# Patient Record
Sex: MALE | Race: WHITE | NOT HISPANIC OR LATINO | URBAN - METROPOLITAN AREA
[De-identification: names, ages, dates, MRNs, and addresses within clinical notes are randomized per-mention and may not be internally consistent; named-entity substitution may affect disease eponyms.]

---

## 2017-11-02 ENCOUNTER — APPOINTMENT (RX ONLY)
Dept: URBAN - METROPOLITAN AREA CLINIC 349 | Facility: CLINIC | Age: 67
Setting detail: DERMATOLOGY
End: 2017-11-02

## 2017-11-02 DIAGNOSIS — L82.1 OTHER SEBORRHEIC KERATOSIS: ICD-10-CM

## 2017-11-02 DIAGNOSIS — D18.0 HEMANGIOMA: ICD-10-CM

## 2017-11-02 DIAGNOSIS — L82.0 INFLAMED SEBORRHEIC KERATOSIS: ICD-10-CM

## 2017-11-02 DIAGNOSIS — L81.4 OTHER MELANIN HYPERPIGMENTATION: ICD-10-CM

## 2017-11-02 DIAGNOSIS — L57.8 OTHER SKIN CHANGES DUE TO CHRONIC EXPOSURE TO NONIONIZING RADIATION: ICD-10-CM

## 2017-11-02 DIAGNOSIS — L81.2 FRECKLES: ICD-10-CM

## 2017-11-02 PROBLEM — L85.3 XEROSIS CUTIS: Status: ACTIVE | Noted: 2017-11-02

## 2017-11-02 PROBLEM — L55.1 SUNBURN OF SECOND DEGREE: Status: ACTIVE | Noted: 2017-11-02

## 2017-11-02 PROBLEM — L29.8 OTHER PRURITUS: Status: ACTIVE | Noted: 2017-11-02

## 2017-11-02 PROBLEM — D18.01 HEMANGIOMA OF SKIN AND SUBCUTANEOUS TISSUE: Status: ACTIVE | Noted: 2017-11-02

## 2017-11-02 PROBLEM — Z85.828 PERSONAL HISTORY OF OTHER MALIGNANT NEOPLASM OF SKIN: Status: ACTIVE | Noted: 2017-11-02

## 2017-11-02 PROBLEM — L20.84 INTRINSIC (ALLERGIC) ECZEMA: Status: ACTIVE | Noted: 2017-11-02

## 2017-11-02 PROCEDURE — ? COUNSELING

## 2017-11-02 PROCEDURE — ? LIQUID NITROGEN

## 2017-11-02 PROCEDURE — 99213 OFFICE O/P EST LOW 20 MIN: CPT | Mod: 25

## 2017-11-02 PROCEDURE — 17110 DESTRUCTION B9 LES UP TO 14: CPT

## 2017-11-02 ASSESSMENT — LOCATION ZONE DERM
LOCATION ZONE: LEG
LOCATION ZONE: ARM
LOCATION ZONE: FACE
LOCATION ZONE: TRUNK
LOCATION ZONE: HAND

## 2017-11-02 ASSESSMENT — LOCATION DETAILED DESCRIPTION DERM
LOCATION DETAILED: RIGHT MEDIAL MALAR CHEEK
LOCATION DETAILED: RIGHT RADIAL DORSAL HAND
LOCATION DETAILED: LEFT CHIN
LOCATION DETAILED: RIGHT MEDIAL UPPER BACK
LOCATION DETAILED: LEFT RIB CAGE
LOCATION DETAILED: LEFT PROXIMAL DORSAL FOREARM
LOCATION DETAILED: RIGHT RIB CAGE
LOCATION DETAILED: LEFT LATERAL ABDOMEN
LOCATION DETAILED: RIGHT POSTERIOR SHOULDER
LOCATION DETAILED: STERNUM
LOCATION DETAILED: RIGHT CENTRAL MALAR CHEEK
LOCATION DETAILED: RIGHT DISTAL PRETIBIAL REGION
LOCATION DETAILED: LEFT ANTERIOR DISTAL THIGH
LOCATION DETAILED: RIGHT SUPERIOR MEDIAL UPPER BACK
LOCATION DETAILED: LEFT POSTERIOR SHOULDER
LOCATION DETAILED: LEFT DISTAL CALF
LOCATION DETAILED: RIGHT INFERIOR MEDIAL FOREHEAD
LOCATION DETAILED: RIGHT PROXIMAL DORSAL FOREARM
LOCATION DETAILED: RIGHT LATERAL ABDOMEN
LOCATION DETAILED: RIGHT DISTAL CALF
LOCATION DETAILED: LEFT CENTRAL MALAR CHEEK
LOCATION DETAILED: RIGHT PROXIMAL PRETIBIAL REGION
LOCATION DETAILED: PERIUMBILICAL SKIN
LOCATION DETAILED: LEFT SUPERIOR MEDIAL MALAR CHEEK
LOCATION DETAILED: LEFT PROXIMAL PRETIBIAL REGION
LOCATION DETAILED: RIGHT ANTERIOR SHOULDER
LOCATION DETAILED: LEFT ANTERIOR SHOULDER
LOCATION DETAILED: LEFT ULNAR DORSAL HAND

## 2017-11-02 ASSESSMENT — LOCATION SIMPLE DESCRIPTION DERM
LOCATION SIMPLE: LEFT FOREARM
LOCATION SIMPLE: RIGHT HAND
LOCATION SIMPLE: CHEST
LOCATION SIMPLE: LEFT THIGH
LOCATION SIMPLE: LEFT CALF
LOCATION SIMPLE: ABDOMEN
LOCATION SIMPLE: LEFT SHOULDER
LOCATION SIMPLE: RIGHT CALF
LOCATION SIMPLE: LEFT CHEEK
LOCATION SIMPLE: CHIN
LOCATION SIMPLE: RIGHT PRETIBIAL REGION
LOCATION SIMPLE: LEFT HAND
LOCATION SIMPLE: RIGHT UPPER BACK
LOCATION SIMPLE: LEFT PRETIBIAL REGION
LOCATION SIMPLE: RIGHT SHOULDER
LOCATION SIMPLE: RIGHT CHEEK
LOCATION SIMPLE: RIGHT FOREARM
LOCATION SIMPLE: RIGHT FOREHEAD

## 2017-11-02 NOTE — PROCEDURE: LIQUID NITROGEN
Medical Necessity Clause: This procedure was medically necessary because the lesions that were treated were:  irritated by daily activities
Add 52 Modifier (Optional): no
Include Z78.9 (Other Specified Conditions Influencing Health Status) As An Associated Diagnosis?: Yes
Post-Care Instructions: I reviewed with the patient in detail post-care instructions. Patient is to wear sunprotection, and avoid picking at any of the treated lesions. Pt may apply Vaseline to crusted or scabbing areas.
Consent: The patient's consent was obtained including but not limited to risks of crusting, scabbing, blistering, scarring, darker or lighter pigmentary change, recurrence, incomplete removal and infection.
Detail Level: Detailed
Number Of Freeze-Thaw Cycles: 2 freeze-thaw cycles
Medical Necessity Information: It is in your best interest to select a reason for this procedure from the list below. All of these items fulfill various CMS LCD requirements except the new and changing color options.

## 2018-02-08 ENCOUNTER — APPOINTMENT (OUTPATIENT)
Dept: GENERAL RADIOLOGY | Age: 68
End: 2018-02-08
Attending: ORTHOPAEDIC SURGERY
Payer: MEDICARE

## 2018-02-08 ENCOUNTER — ANESTHESIA EVENT (OUTPATIENT)
Dept: SURGERY | Age: 68
End: 2018-02-08
Payer: MEDICARE

## 2018-02-08 ENCOUNTER — ANESTHESIA (OUTPATIENT)
Dept: SURGERY | Age: 68
End: 2018-02-08
Payer: MEDICARE

## 2018-02-08 ENCOUNTER — HOSPITAL ENCOUNTER (OUTPATIENT)
Age: 68
Setting detail: OUTPATIENT SURGERY
Discharge: HOME | End: 2018-02-08
Attending: ORTHOPAEDIC SURGERY | Admitting: ORTHOPAEDIC SURGERY
Payer: MEDICARE

## 2018-02-08 VITALS
BODY MASS INDEX: 29.57 KG/M2 | HEART RATE: 58 BPM | OXYGEN SATURATION: 95 % | WEIGHT: 215 LBS | DIASTOLIC BLOOD PRESSURE: 89 MMHG | TEMPERATURE: 98 F | SYSTOLIC BLOOD PRESSURE: 134 MMHG | RESPIRATION RATE: 16 BRPM

## 2018-02-08 PROCEDURE — 77030000032 HC CUF TRNQT ZIMM -B: Performed by: ORTHOPAEDIC SURGERY

## 2018-02-08 PROCEDURE — 77030020274 HC MISC IMPL ORTHOPEDIC: Performed by: ORTHOPAEDIC SURGERY

## 2018-02-08 PROCEDURE — C1713 ANCHOR/SCREW BN/BN,TIS/BN: HCPCS | Performed by: ORTHOPAEDIC SURGERY

## 2018-02-08 PROCEDURE — C1769 GUIDE WIRE: HCPCS | Performed by: ORTHOPAEDIC SURGERY

## 2018-02-08 PROCEDURE — 77030020778 HC CAP PROTCT PIN JRGN -A: Performed by: ORTHOPAEDIC SURGERY

## 2018-02-08 PROCEDURE — 74011250636 HC RX REV CODE- 250/636: Performed by: ANESTHESIOLOGY

## 2018-02-08 PROCEDURE — 77030003602 HC NDL NRV BLK BBMI -B: Performed by: ANESTHESIOLOGY

## 2018-02-08 PROCEDURE — 76210000063 HC OR PH I REC FIRST 0.5 HR: Performed by: ORTHOPAEDIC SURGERY

## 2018-02-08 PROCEDURE — 77030011884 HC TAPE CST PLSTR BSNM -A: Performed by: ORTHOPAEDIC SURGERY

## 2018-02-08 PROCEDURE — 77030015464 HC BIT DRL QC SYNT -E: Performed by: ORTHOPAEDIC SURGERY

## 2018-02-08 PROCEDURE — 76942 ECHO GUIDE FOR BIOPSY: CPT | Performed by: ORTHOPAEDIC SURGERY

## 2018-02-08 PROCEDURE — 77030011640 HC PAD GRND REM COVD -A: Performed by: ORTHOPAEDIC SURGERY

## 2018-02-08 PROCEDURE — 76210000021 HC REC RM PH II 0.5 TO 1 HR: Performed by: ORTHOPAEDIC SURGERY

## 2018-02-08 PROCEDURE — 74011250636 HC RX REV CODE- 250/636

## 2018-02-08 PROCEDURE — 76010000149 HC OR TIME 1 TO 1.5 HR: Performed by: ORTHOPAEDIC SURGERY

## 2018-02-08 PROCEDURE — 76010010054 HC POST OP PAIN BLOCK: Performed by: ORTHOPAEDIC SURGERY

## 2018-02-08 PROCEDURE — 76060000033 HC ANESTHESIA 1 TO 1.5 HR: Performed by: ORTHOPAEDIC SURGERY

## 2018-02-08 PROCEDURE — 77030002933 HC SUT MCRYL J&J -A: Performed by: ORTHOPAEDIC SURGERY

## 2018-02-08 PROCEDURE — 77030018836 HC SOL IRR NACL ICUM -A: Performed by: ORTHOPAEDIC SURGERY

## 2018-02-08 PROCEDURE — 77030002916 HC SUT ETHLN J&J -A: Performed by: ORTHOPAEDIC SURGERY

## 2018-02-08 PROCEDURE — 77030006788 HC BLD SAW OSC STRY -B: Performed by: ORTHOPAEDIC SURGERY

## 2018-02-08 PROCEDURE — 74011250637 HC RX REV CODE- 250/637: Performed by: ANESTHESIOLOGY

## 2018-02-08 PROCEDURE — 77030002982 HC SUT POLYSRB J&J -A: Performed by: ORTHOPAEDIC SURGERY

## 2018-02-08 PROCEDURE — 74011250636 HC RX REV CODE- 250/636: Performed by: ORTHOPAEDIC SURGERY

## 2018-02-08 DEVICE — IMPLANTABLE DEVICE: Type: IMPLANTABLE DEVICE | Site: FOOT | Status: FUNCTIONAL

## 2018-02-08 DEVICE — STAPLE INT W11XH10MM WIRE 1.5X1.5MM HND WRST NIT SPD CONT: Type: IMPLANTABLE DEVICE | Site: FOOT | Status: FUNCTIONAL

## 2018-02-08 RX ORDER — CEFAZOLIN SODIUM/WATER 2 G/20 ML
2 SYRINGE (ML) INTRAVENOUS
Status: COMPLETED | OUTPATIENT
Start: 2018-02-08 | End: 2018-02-08

## 2018-02-08 RX ORDER — PROPOFOL 10 MG/ML
INJECTION, EMULSION INTRAVENOUS AS NEEDED
Status: DISCONTINUED | OUTPATIENT
Start: 2018-02-08 | End: 2018-02-08 | Stop reason: HOSPADM

## 2018-02-08 RX ORDER — HYDROMORPHONE HYDROCHLORIDE 2 MG/ML
0.5 INJECTION, SOLUTION INTRAMUSCULAR; INTRAVENOUS; SUBCUTANEOUS
Status: DISCONTINUED | OUTPATIENT
Start: 2018-02-08 | End: 2018-02-08 | Stop reason: HOSPADM

## 2018-02-08 RX ORDER — SODIUM CHLORIDE, SODIUM LACTATE, POTASSIUM CHLORIDE, CALCIUM CHLORIDE 600; 310; 30; 20 MG/100ML; MG/100ML; MG/100ML; MG/100ML
75 INJECTION, SOLUTION INTRAVENOUS CONTINUOUS
Status: DISCONTINUED | OUTPATIENT
Start: 2018-02-08 | End: 2018-02-08 | Stop reason: HOSPADM

## 2018-02-08 RX ORDER — FAMOTIDINE 20 MG/1
20 TABLET, FILM COATED ORAL ONCE
Status: COMPLETED | OUTPATIENT
Start: 2018-02-08 | End: 2018-02-08

## 2018-02-08 RX ORDER — NALOXONE HYDROCHLORIDE 0.4 MG/ML
0.2 INJECTION, SOLUTION INTRAMUSCULAR; INTRAVENOUS; SUBCUTANEOUS AS NEEDED
Status: DISCONTINUED | OUTPATIENT
Start: 2018-02-08 | End: 2018-02-08 | Stop reason: HOSPADM

## 2018-02-08 RX ORDER — PROPOFOL 10 MG/ML
INJECTION, EMULSION INTRAVENOUS
Status: DISCONTINUED | OUTPATIENT
Start: 2018-02-08 | End: 2018-02-08 | Stop reason: HOSPADM

## 2018-02-08 RX ORDER — ONDANSETRON 2 MG/ML
INJECTION INTRAMUSCULAR; INTRAVENOUS AS NEEDED
Status: DISCONTINUED | OUTPATIENT
Start: 2018-02-08 | End: 2018-02-08 | Stop reason: HOSPADM

## 2018-02-08 RX ORDER — LIDOCAINE HYDROCHLORIDE 10 MG/ML
0.1 INJECTION INFILTRATION; PERINEURAL AS NEEDED
Status: DISCONTINUED | OUTPATIENT
Start: 2018-02-08 | End: 2018-02-08 | Stop reason: HOSPADM

## 2018-02-08 RX ORDER — SODIUM CHLORIDE 0.9 % (FLUSH) 0.9 %
5-10 SYRINGE (ML) INJECTION AS NEEDED
Status: DISCONTINUED | OUTPATIENT
Start: 2018-02-08 | End: 2018-02-08 | Stop reason: HOSPADM

## 2018-02-08 RX ORDER — OXYCODONE AND ACETAMINOPHEN 5; 325 MG/1; MG/1
1 TABLET ORAL AS NEEDED
Status: DISCONTINUED | OUTPATIENT
Start: 2018-02-08 | End: 2018-02-08 | Stop reason: HOSPADM

## 2018-02-08 RX ORDER — MIDAZOLAM HYDROCHLORIDE 1 MG/ML
2 INJECTION, SOLUTION INTRAMUSCULAR; INTRAVENOUS
Status: DISCONTINUED | OUTPATIENT
Start: 2018-02-08 | End: 2018-02-08 | Stop reason: HOSPADM

## 2018-02-08 RX ADMIN — PROPOFOL 140 MCG/KG/MIN: 10 INJECTION, EMULSION INTRAVENOUS at 14:05

## 2018-02-08 RX ADMIN — FAMOTIDINE 20 MG: 20 TABLET, FILM COATED ORAL at 12:54

## 2018-02-08 RX ADMIN — SODIUM CHLORIDE, SODIUM LACTATE, POTASSIUM CHLORIDE, AND CALCIUM CHLORIDE 75 ML/HR: 600; 310; 30; 20 INJECTION, SOLUTION INTRAVENOUS at 12:54

## 2018-02-08 RX ADMIN — PROPOFOL 30 MG: 10 INJECTION, EMULSION INTRAVENOUS at 14:35

## 2018-02-08 RX ADMIN — PROPOFOL 50 MG: 10 INJECTION, EMULSION INTRAVENOUS at 14:05

## 2018-02-08 RX ADMIN — Medication 2 G: at 14:05

## 2018-02-08 RX ADMIN — ONDANSETRON 4 MG: 2 INJECTION INTRAMUSCULAR; INTRAVENOUS at 14:37

## 2018-02-08 RX ADMIN — MIDAZOLAM HYDROCHLORIDE 2 MG: 1 INJECTION, SOLUTION INTRAMUSCULAR; INTRAVENOUS at 13:05

## 2018-02-08 NOTE — IP AVS SNAPSHOT
[address] 
[phone] Patient: Torsten Urbina MRN: [id number] About your hospitalization You were admitted on:  February 8, 2018 You last received care in the:  [place] [address] You were discharged on:  February 8, 2018 Why you were hospitalized Your primary diagnosis was:  Not on File Follow-up Information Follow up With Details Comments Contact Info Monico Gregory MD  as scheduled [address] [address] 
[phone] Discharge Orders None A check trace indicates which time of day the medication should be taken. My Medications ASK your doctor about these medications Instructions Each Dose to Equal  
 Morning Noon Evening Bedtime  
 aspirin, buffered 81 mg Tab Your last dose was: Your next dose is: Take  by mouth. Indications: prevention of transient ischemic attack  
     
   
   
   
  
 multivitamin tablet Commonly known as:  ONE A DAY Your last dose was: Your next dose is: Take 1 tablet by mouth daily. 1 Tab  
    
   
   
   
  
 sildenafil citrate 100 mg tablet Commonly known as:  VIAGRA Your last dose was: Your next dose is: Take 1 Tab by mouth as needed. 100 mg Discharge Instructions INSTRUCTIONS FOLLOWING FOOT SURGERY 
 
ACTIVITY Elevate foot. No Ice Protected partial weight bearing on the heel only as tolerated in post op shoe after full sensation returns. DIET Day of Surgery: Clear liquids until no nausea or vomiting; then light, bland diet (Baked chicken, plain rice, grits, scrambled egg, toast). Nothing Greasy, fried or spicy today Advance to regular diet on second day, unless your doctor orders otherwise. PAIN Take pain medications as directed by your doctor. Call your doctor if pain is NOT relieved by medication. DRESSING CARE Keep dry and in place until follow up appointment CALL YOUR DOCTOR IF YOU HAVE Excessive bleeding that does not stop after holding mild pressure over the area. Temperature of 101 degrees or above. Redness, excessive swelling or bruising, and/or green or yellow, smelly discharge from incision. Loss of sensation - cold, white or blue toes. AFTER ANESTHESIA For the first 24 hours and while taking narcotics for pain: DO NOT Drive, Drink Alcoholic beverages, or make important Decisions. Be aware of dizziness following anesthesia and while taking pain medication. Preventing Infection at Home We care about preventing infection and avoiding the spread of germs  not only when you are in the hospital but also when you return home. When you return home from the hospital, its important to take the following steps to help prevent infection and avoid spreading germs that could infect you and others. Ask everyone in your home to follow these guidelines, too. Clean Your Hands · Clean your hands whenever your hands are visibly dirty, before you eat, before or after touching your mouth, nose or eyes, and before preparing food. Clean them after contact with body fluids, using the restroom, touching animals or changing diapers. · When washing hands, wet them with warm water and work up a lather. Rub hands for at least 15 seconds, then rinse them and pat them dry with a clean towel or paper towel. · When using hand sanitizers, it should take about 15 seconds to rub your hands dry. If not, you probably didnt apply enough . Cover Your Sneeze or Cough Germs are released into the air whenever you sneeze or cough. To prevent the spread of infection: · Turn away from other people before coughing or sneezing. · Cover your mouth or nose with a tissue when you cough or sneeze. Put the tissue in the trash. · If you dont have a tissue, cough or sneeze into your upper sleeve, not your hands. · Always clean your hands after coughing or sneezing. Care for Wounds Your skin is your bodys first line of defense against germs, but an open wound leaves an easy way for germs to enter your body. To prevent infection: · Clean your hands before and after changing wound dressings, and wear gloves to change dressings if recommended by your doctor. · Take special care with IV lines or other devices inserted into the body. If you must touch them, clean your hands first. 
· Follow any specific instructions from your doctor to care for your wounds. Contact your doctor if you experience any signs of infection, such as fever or increased redness at the surgical or wound site. Keep a [place] [address] · Clean or wipe commonly touched hard surfaces like door handles, sinks, tabletops, phones and TV remotes. · Use products labeled disinfectant to kill harmful bacteria and viruses. · Use a clean cloth or paper towel to clean and dry surfaces. Wiping surfaces with a dirty dishcloth, sponge or towel will only spread germs. · Never share toothbrushes, ortiz, drinking glasses, utensils, razor blades, face cloths or bath towels to avoid spreading germs. · Be sure that the linens that you sleep on are clean. · Keep pets away from wounds and wash your hands after touching pets, their toys or bedding. We care about you and your health. Remember, preventing infections is a team effort between you, your family, friends and health care providers. DISCHARGE SUMMARY from Nurse PATIENT INSTRUCTIONS: 
 
After general anesthesia or intravenous sedation, for 24 hours or while taking prescription Narcotics: · Limit your activities · Do not drive and operate hazardous machinery · Do not make important personal or business decisions · Do  not drink alcoholic beverages · If you have not urinated within 8 hours after discharge, please contact your surgeon on call. *  Please give a list of your current medications to your Primary Care Provider. *  Please update this list whenever your medications are discontinued, doses are 
    changed, or new medications (including over-the-counter products) are added. *  Please carry medication information at all times in case of emergency situations. These are general instructions for a healthy lifestyle: No smoking/ No tobacco products/ Avoid exposure to second hand smoke Surgeon General's Warning:  Quitting smoking now greatly reduces serious risk to your health. Obesity, smoking, and sedentary lifestyle greatly increases your risk for illness A healthy diet, regular physical exercise & weight monitoring are important for maintaining a healthy lifestyle You may be retaining fluid if you have a history of heart failure or if you experience any of the following symptoms:  Weight gain of 3 pounds or more overnight or 5 pounds in a week, increased swelling in our hands or feet or shortness of breath while lying flat in bed. Please call your doctor as soon as you notice any of these symptoms; do not wait until your next office visit. Recognize signs and symptoms of STROKE: 
 
F-face looks uneven A-arms unable to move or move unevenly S-speech slurred or non-existent T-time-call 911 as soon as signs and symptoms begin-DO NOT go Back to bed or wait to see if you get better-TIME IS BRAIN. Introducing [facility]! [facility] introduces [employer] patient portal. Now you can access parts of your medical record, email your doctor's office, and request medication refills online. 1. In your internet browser, go to [email or web address] 2. Click on the First Time User? Click Here link in the Sign In box. You will see the New Member Sign Up page. 3. Enter your [employer] Access Code exactly as it appears below. You will not need to use this code after youve completed the sign-up process. If you do not sign up before the expiration date, you must request a new code. · [employer] Access Code: [email or web address] Expires: 5/3/2018  1:44 PM 
 
4. Enter the last four digits of your Social Security Number (xxxx) and Date of Birth (mm/dd/yyyy) as indicated and click Submit. You will be taken to the next sign-up page. 5. Create a [employer] ID. This will be your [employer] login ID and cannot be changed, so think of one that is secure and easy to remember. 6. Create a [employer] password. You can change your password at any time. 7. Enter your Password Reset Question and Answer. This can be used at a later time if you forget your password. 8. Enter your e-mail address. You will receive e-mail notification when new information is available in [address]. 9. Click Sign Up. You can now view and download portions of your medical record. 10. Click the Download Summary menu link to download a portable copy of your medical information. If you have questions, please visit the Frequently Asked Questions section of the [employer] website. Remember, [employer] is NOT to be used for urgent needs. For medical emergencies, dial 911. Now available from your iPhone and Android! Providers Seen During Your Hospitalization Provider Specialty Primary office phone Gregor Gerardo MD Orthopedic Surgery [phone] Your Primary Care Physician (PCP) Primary Care Physician Office Phone Office Fax Mook Covarrubias [phone] You are allergic to the following No active allergies Recent Documentation Weight BMI Smoking Status 97.5 kg 29.57 kg/m2 Former Smoker Emergency Contacts Name Discharge Info Relation Home Work Mobile Antonia Sanderson  Spouse [phone] Patient Belongings The following personal items are in your possession at time of discharge: 
     Visual Aid: None          Jewelry: None  Clothing: Undergarments, Footwear, Shirt, Pants Please provide this summary of care documentation to your next provider. Signatures-by signing, you are acknowledging that this After Visit Summary has been reviewed with you and you have received a copy. Patient Signature:  ____________________________________________________________ Date:  ____________________________________________________________  
  
Valarie Deiters Provider Signature:  ____________________________________________________________ Date:  ____________________________________________________________

## 2018-02-08 NOTE — BRIEF OP NOTE
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  Item Serial No.  Lot No. LRB No. Used Action   STAPLE BNE FT/ANKL [id number] -- SPEED - [id number]  STAPLE BNE FT/ANKL [id number] -- SPEED  [employer] [place] [id number] Right 1 Implanted   SCR COMP HDLSS S-THRD [id number] -- JOSH - [id number]  SCR COMP HDLSS S-THRD [id number] -- Tamia Pace Right 1 Implanted   [place] Plantar Plate Repair Implant        [facility] [id number] Right 1 Implanted

## 2018-02-08 NOTE — ANESTHESIA PREPROCEDURE EVALUATION
Anesthetic History   No history of anesthetic complications            Review of Systems / Medical History  Patient summary reviewed, nursing notes reviewed and pertinent labs reviewed    Pulmonary  Within defined limits                 Neuro/Psych   Within defined limits           Cardiovascular                  Exercise tolerance: >4 METS     GI/Hepatic/Renal  Within defined limits              Endo/Other  Within defined limits           Other Findings              Physical Exam    Airway  Mallampati: II  TM Distance: 4 - 6 cm  Neck ROM: normal range of motion   Mouth opening: Normal     Cardiovascular    Rhythm: regular           Dental  No notable dental hx       Pulmonary  Breath sounds clear to auscultation               Abdominal         Other Findings            Anesthetic Plan    ASA: 2  Anesthesia type: regional          Induction: Intravenous  Anesthetic plan and risks discussed with: Patient

## 2018-02-08 NOTE — DISCHARGE INSTRUCTIONS
INSTRUCTIONS FOLLOWING FOOT SURGERY    ACTIVITY  Elevate foot. No Ice  Protected partial weight bearing on the heel only as tolerated in post op shoe after full sensation returns. DIET  Day of Surgery: Clear liquids until no nausea or vomiting; then light, bland diet (Baked chicken, plain rice, grits, scrambled egg, toast). Nothing Greasy, fried or spicy today  Advance to regular diet on second day, unless your doctor orders otherwise. PAIN  Take pain medications as directed by your doctor. Call your doctor if pain is NOT relieved by medication. DRESSING CARE Keep dry and in place until follow up appointment    CALL YOUR DOCTOR IF YOU HAVE  Excessive bleeding that does not stop after holding mild pressure over the area. Temperature of 101 degrees or above. Redness, excessive swelling or bruising, and/or green or yellow, smelly discharge from incision. Loss of sensation - cold, white or blue toes. AFTER ANESTHESIA  For the first 24 hours and while taking narcotics for pain: DO NOT Drive, Drink Alcoholic beverages, or make important Decisions. Be aware of dizziness following anesthesia and while taking pain medication. Preventing Infection at Home  We care about preventing infection and avoiding the spread of germs - not only when you are in the hospital but also when you return home. When you return home from the hospital, its important to take the following steps to help prevent infection and avoid spreading germs that could infect you and others. Ask everyone in your home to follow these guidelines, too. Clean Your Hands  · Clean your hands whenever your hands are visibly dirty, before you eat, before or after touching your mouth, nose or eyes, and before preparing food. Clean them after contact with body fluids, using the restroom, touching animals or changing diapers. · When washing hands, wet them with warm water and work up a lather.  Rub hands for at least 15 seconds, then rinse them and pat them dry with a clean towel or paper towel. · When using hand sanitizers, it should take about 15 seconds to rub your hands dry. If not, you probably didnt apply enough . Cover Your Sneeze or Cough  Germs are released into the air whenever you sneeze or cough. To prevent the spread of infection:  · Turn away from other people before coughing or sneezing. · Cover your mouth or nose with a tissue when you cough or sneeze. Put the tissue in the trash. · If you dont have a tissue, cough or sneeze into your upper sleeve, not your hands. · Always clean your hands after coughing or sneezing. Care for Wounds  Your skin is your bodys first line of defense against germs, but an open wound leaves an easy way for germs to enter your body. To prevent infection:  · Clean your hands before and after changing wound dressings, and wear gloves to change dressings if recommended by your doctor. · Take special care with IV lines or other devices inserted into the body. If you must touch them, clean your hands first.  · Follow any specific instructions from your doctor to care for your wounds. Contact your doctor if you experience any signs of infection, such as fever or increased redness at the surgical or wound site. Keep a Clean Home  · Clean or wipe commonly touched hard surfaces like door handles, sinks, tabletops, phones and TV remotes. · Use products labeled disinfectant to kill harmful bacteria and viruses. · Use a clean cloth or paper towel to clean and dry surfaces. Wiping surfaces with a dirty dishcloth, sponge or towel will only spread germs. · Never share toothbrushes, ortiz, drinking glasses, utensils, razor blades, face cloths or bath towels to avoid spreading germs. · Be sure that the linens that you sleep on are clean. · Keep pets away from wounds and wash your hands after touching pets, their toys or bedding. We care about you and your health.  Remember, preventing infections is a team effort between you, your family, friends and health care providers. DISCHARGE SUMMARY from Nurse    PATIENT INSTRUCTIONS:    After general anesthesia or intravenous sedation, for 24 hours or while taking prescription Narcotics:  · Limit your activities  · Do not drive and operate hazardous machinery  · Do not make important personal or business decisions  · Do  not drink alcoholic beverages  · If you have not urinated within 8 hours after discharge, please contact your surgeon on call. *  Please give a list of your current medications to your Primary Care Provider. *  Please update this list whenever your medications are discontinued, doses are      changed, or new medications (including over-the-counter products) are added. *  Please carry medication information at all times in case of emergency situations. These are general instructions for a healthy lifestyle:    No smoking/ No tobacco products/ Avoid exposure to second hand smoke    Surgeon General's Warning:  Quitting smoking now greatly reduces serious risk to your health. Obesity, smoking, and sedentary lifestyle greatly increases your risk for illness    A healthy diet, regular physical exercise & weight monitoring are important for maintaining a healthy lifestyle    You may be retaining fluid if you have a history of heart failure or if you experience any of the following symptoms:  Weight gain of 3 pounds or more overnight or 5 pounds in a week, increased swelling in our hands or feet or shortness of breath while lying flat in bed. Please call your doctor as soon as you notice any of these symptoms; do not wait until your next office visit. Recognize signs and symptoms of STROKE:    F-face looks uneven    A-arms unable to move or move unevenly    S-speech slurred or non-existent    T-time-call 911 as soon as signs and symptoms begin-DO NOT go       Back to bed or wait to see if you get better-TIME IS BRAIN.

## 2018-02-08 NOTE — ANESTHESIA PROCEDURE NOTES
Peripheral Block    Start time: 2/8/2018 1:05 PM  End time: 2/8/2018 1:13 PM  Performed by: Melvina Krueger  Authorized by: Melvina Krueger       Pre-procedure:    Indications: at surgeon's request and post-op pain management    Preanesthetic Checklist: patient identified, risks and benefits discussed, site marked, timeout performed, anesthesia consent given and patient being monitored    Timeout Time: 13:05          Block Type:   Block Type:  Popliteal and adductor canal  Laterality:  Right  Monitoring:  Standard ASA monitoring, continuous pulse ox, frequent vital sign checks, heart rate, responsive to questions and oxygen  Injection Technique:  Single shot  Procedures: ultrasound guided and nerve stimulator    Prep: chlorhexidine    Location:  Lower thigh  Needle Type:  Stimuplex  Needle Gauge:  21 G  Needle Localization:  Nerve stimulator and ultrasound guidance  Motor Response: minimal motor response >0.4 mA    Medication Injected:  0.5%  ropivacaine  Volume (mL):  30  Add'l Medication Injected:  1.5%  mepivacaine  Volume (mL):  15    Assessment:  Number of attempts:  1  Injection Assessment:  Incremental injection every 5 mL, no paresthesia, ultrasound image on chart, local visualized surrounding nerve on ultrasound, negative aspiration for blood, no intravascular symptoms and negative aspiration for CSF  Patient tolerance:  Patient tolerated the procedure well with no immediate complications  Popliteal Nerve Block Time:  5454-0400  Popliteal Block local anesthetic volume: 0.5% ropivacaine, 20 mL and 1.5% mepivacaine, 10 mL    Adductor Canal Block Time:  5333-7527  Adductor Canal Block local anesthetic volume:  0.5% ropivacaine, 10 mL and 1.5% mepivacaine, 5 mL

## 2018-02-08 NOTE — ANESTHESIA POSTPROCEDURE EVALUATION
Post-Anesthesia Evaluation and Assessment    Patient: Gulshan Shultz MRN: [id number]  SSN: [id number]    YOB: 1950  Age: 79 y.o. Sex: male       Cardiovascular Function/Vital Signs  Visit Vitals    /89 (BP 1 Location: Left arm, BP Patient Position: At rest;Supine)    Pulse (!) 58    Temp 36.7 °C (98 °F)    Resp 16    Wt 97.5 kg (215 lb)    SpO2 95%    BMI 29.57 kg/m2       Patient is status post regional anesthesia for Procedure(s):  RIGHT CHEVRON AKIN BUNIONECTOMY  RIGHT SECOND PROXIMAL INTERPHALANGEAL RESECTION ARTHROPLASTY AND WEIL WITH PLANTAR PLATE REPAIR  CHOICE ANES. Nausea/Vomiting: None    Postoperative hydration reviewed and adequate. Pain:  Pain Scale 1: Numeric (0 - 10) (02/08/18 1542)  Pain Intensity 1: 0 (02/08/18 1542)   Managed    Neurological Status:   Neuro (WDL): Within Defined Limits (02/08/18 1542)  Neuro  Neurologic State: Alert (02/08/18 1542)   At baseline    Mental Status and Level of Consciousness: Arousable    Pulmonary Status:   O2 Device: Room air (02/08/18 1542)   Adequate oxygenation and airway patent    Complications related to anesthesia: None    Post-anesthesia assessment completed.  No concerns    Signed By: Amirah Lezama MD     February 8, 2018

## 2018-02-09 NOTE — OP NOTES
Vir[address] 65  OPERATIVE REPORT    Salvador Garcia  MR#: [id number]  : 1950  ACCOUNT #: [de-identified]   DATE OF SERVICE: 2018    PREOPERATIVE DIAGNOSES:  1. Right hallux valgus. 2.  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 POSTOPERATIVE DIAGNOSES:  1. Right hallux valgus. 2.  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 PROCEDURE PERFORMED:  1. Right distal Chevron metatarsal osteotomy with Akin phalangeal osteotomy. 2.  Right second PIP resection arthroplasty. 3.  Right second MTP Weil osteotomy. 4.  Right second MTP plantar plate repair. SURGEON:  Jeanette Lucas MD        ANESTHESIA:  Popliteal block with monitored anesthesia care. ESTIMATED BLOOD LOSS:  Minimal.    TOURNIQUET TIME:  54 minutes at 250 mmHg. ANTIBIOTIC PROPHYLAXIS:  Ancef given prior to procedure. INDICATIONS:  The patient is a 80-year-old white male with symptomatic right hallux valgus and second claw toe deformity with MTP instability. He has failed conservative therapy and desires surgical treatment. Risks and benefits of the procedure including but not limited to risk of anesthetic complications including myocardial infarction, stroke and death, as well as surgical complications including damage to nerves and blood vessels, risk for infection, incomplete pain relief, risk of malunion, risk of nonunion, risk of recurrence, need for additional surgery were discussed with the patient. He understands the risks and wished to proceed with surgery at this time. DETAILS OF PROCEDURE:  The patient's operative site was marked with indelible ink in the preop holding area. Block was placed by the department of anesthesia. The patient was taken to the operating room and placed supine.   During a preoperative surgical timeout, the right lower extremity was identified as the surgical site, prepped and draped in the standard sterile fashion using ChloraPrep solution. Medial approach to the first MTP joint was performed at that time followed by a longitudinal capsulotomy. The medial eminence was resected using an oscillating saw. A Chevron osteotomy was performed in the distal metatarsal.  The capital fragment shifted approximately 3 mm laterally and impacted on the shaft. This was secured using a Synthes headless screw under fluoroscopic guidance. An Carlos osteotomy was performed through the proximal phalanx and secured using a Synthes compression staple. PIP resection arthroplasty of the second toe was performed with an elliptical incision. A dorsal approach to the second MTP joint was also performed at that time with an extensor tendon lengthening capsulotomy. A Weil osteotomy was performed and later secured using a twist-off screw from [facility].  An underlying plantar plate tear was then identified and repaired using nonabsorbable sutures brought through drill holes in the base of the proximal phalanx. The toe was then pinned using a K-wire in a retrograde fashion and confirmed to be adequately placed on image intensification. The wounds were irrigated and then closed using Vicryl in the capsule followed by Monocryl and nylon sutures on the skin. A sterile dressing was then applied. Anesthesia was discontinued. The patient was subsequently transferred to a recovery bed and taken to the recovery room in satisfactory condition. He appeared to tolerate the procedure well. There were no apparent surgical or anesthetic complications. All needle and sponge counts were correct.       MD Cory Sahni / Abigail Zimmerman  D: 02/08/2018 16:06     T: 02/08/2018 20:07  JOB #: [id number]

## 2021-04-19 ENCOUNTER — HOSPITAL ENCOUNTER (OUTPATIENT)
Dept: PHYSICAL THERAPY | Age: 71
Discharge: HOME | End: 2021-04-19
Attending: FAMILY MEDICINE
Payer: MEDICARE

## 2021-04-19 ENCOUNTER — HOSPITAL ENCOUNTER (OUTPATIENT)
Dept: ULTRASOUND IMAGING | Age: 71
Discharge: HOME | End: 2021-04-19
Attending: FAMILY MEDICINE
Payer: MEDICARE

## 2021-04-19 DIAGNOSIS — M79.604 RIGHT LEG PAIN: ICD-10-CM

## 2021-04-19 DIAGNOSIS — M25.469 SWELLING OF KNEE: ICD-10-CM

## 2021-04-19 PROCEDURE — 97162 PT EVAL MOD COMPLEX 30 MIN: CPT

## 2021-04-19 PROCEDURE — 93971 EXTREMITY STUDY: CPT

## 2021-04-19 PROCEDURE — 97140 MANUAL THERAPY 1/> REGIONS: CPT

## 2021-04-19 NOTE — THERAPY EVALUATION
Haroldo Armstrong : 1950 [place] Secondary: [address] at [facility] [address] [facility], [address], [place]. [phone]   [phone] OUTPATIENT PHYSICAL THERAPY:Initial Assessment 2021 ICD-10: Treatment Diagnosis: Pain in right leg (M79.604) Precautions/Allergies:  
Patient has no known allergies. TREATMENT PLAN: 
Effective Dates: 2021 TO 2021 (90 days). Frequency/Duration: 2 times a week for 90 Day(s) MEDICAL/REFERRING DIAGNOSIS: 
Right leg pain [M79.604] DATE OF ONSET: 2021 REFERRING PHYSICIAN: Denis Zambrano MD MD Orders: Evaluate and treat Return MD Appointment: unknown INITIAL ASSESSMENT:  Mr. Frederick Bautista presents with increased pain, increased muscular tightness, and difficulty walking. Patient would benefit from skilled physical therapy to address problems and goals. Thank you for this referral.     
PROBLEM LIST (Impacting functional limitations): 1. Decreased Ambulation Ability/Technique 2. Increased Pain 3. Decreased Flexibility/Joint Mobility 4. Decreased Barnes with Home Exercise Program 
5. Increased muscular tightness INTERVENTIONS PLANNED: (Treatment may consist of any combination of the following) 1. Cold 2. Heat 3. Home Exercise Program (HEP) 4. Manual Therapy 5. Range of Motion (ROM) 6. Therapeutic Exercise/Strengthening 7. Ultrasound (US)  
 
GOALS: (Goals have been discussed and agreed upon with patient.) Short-Term Functional Goals: Time Frame: 30 days 1. Patient will be independent with home exercise program to decrease pain. Discharge Goals: Time Frame: 90 days 1. Patient will score greater than or equal to 77 on Lower Extremity Functional Scale indicating improvement. 2. Patient will report being able to return to his gym with no complaints of increased pain.   
3. Patient will report being able to sleep through the night without pain. OUTCOME MEASURE:  
Tool Used: Lower Extremity Functional Scale (LEFS) Score:  Initial: 71/80 Most Recent: X/80 (Date: -- ) Interpretation of Score: 20 questions each scored on a 5 point scale with 0 representing \"extreme difficulty or unable to perform\" and 4 representing \"no difficulty\". The lower the score, the greater the functional disability. 80/80 represents no disability. Minimal detectable change is 9 points. MEDICAL NECESSITY:  
· Patient is expected to demonstrate progress in strength, range of motion and pain to improve ease with mobility. REASON FOR SERVICES/OTHER COMMENTS: 
· Patient continues to require skilled intervention due to increased pain affecting activities of daily living. Total Duration: PT Patient Time In/Time Out Time In: 1500 Time Out: 1540 Rehabilitation Potential For Stated Goals: Excellent Regarding Lucio Gonzalezman's therapy, I certify that the treatment plan above will be carried out by a therapist or under their direction. Thank you for this referral, 
Marianna Travis, PT Referring Physician Signature: Adrian Magallanes MD _______________________________ Date _____________ PAIN/SUBJECTIVE:  
Initial: Pain Intensity 1: 0 Pain Location 1: Knee, Leg 
Pain Orientation 1: Right  Post Session:  0/10 HISTORY:  
History of Injury/Illness (Reason for Referral): 
Patient reports right leg pain began on April 12, 2021. Patient reports he was cleaning the pollen off his porch that day. That night patient reports having deep pain along the middle of his right thigh. Patient rates current pain level as 0/10 and pain level as night as 2/10. Patient reports having difficulty sleeping at night due to the discomfort. Aggravating factors are squatting.    
Past Medical History/Comorbidities:  
Mr. Roselia Ruiz  has a past medical history of Allergy desensitization therapy, Backache, unspecified (10/24/2016), Cervicalgia (10/24/2016), H/O complete eye exam (Fall 2019), Hypertension, Pain in joint, multiple sites (10/24/2016), Positive DARRIUS (antinuclear antibody) (10/24/2016), Shingles (1990's), and Toe fracture, left (2019). He also has no past medical history of Difficult intubation, Malignant hyperthermia due to anesthesia, Nausea & vomiting, or Pseudocholinesterase deficiency. Mr. Cris Ratliff  has a past surgical history that includes hx cyst removal (1969); hx colonoscopy (2011); hx other surgical; hx heent (1954); hx orthopaedic (1980); and hx orthopaedic (02/2018). Social History/Living Environment:  
  Lives with his spouse. Prior Level of Function/Work/Activity: 
Independent. Retired. Works out at [employer] 2-3 times a week and plays golf 3 times a week. Dominant Side:  
      RIGHT Personal Factors:   
      Sex:  male Age:  79 y.o. Ambulatory/Rehab Services H2 Model Falls Risk Assessment Risk Factors: 
     (1)  Gender [Male] Ability to Rise from Chair: 
     (0)  Ability to rise in a single movement Falls Prevention Plan: No modifications necessary Total: (5 or greater = High Risk): 1 ©2010 [facility] [address] [place] [address] States Patent #[id number]. Federal Law prohibits the replication, distribution or use without written permission from [facility] [employer] Current Medications:   
  
Current Outpatient Medications:  
  predniSONE (STERAPRED DS) 10 mg dose pack, TAD over 6 days, Disp: 1 Dose Pack, Rfl: 0 
  tiZANidine (ZANAFLEX) 4 mg tablet, Take 0.5-1 Tabs by mouth every eight (8) hours as needed for Muscle Spasm(s) for up to 10 days. , Disp: 30 Tab, Rfl: 0 
  lisinopriL (PRINIVIL, ZESTRIL) 10 mg tablet, Take 1 Tab by mouth daily. , Disp: 90 Tab, Rfl: 3 
  tamsulosin (FLOMAX) 0.4 mg capsule, Take 1 Cap by mouth daily. , Disp: 90 Cap, Rfl: 3 
  tadalafiL (Cialis) 5 mg tablet, Take 1 Tab by mouth daily. , Disp: 30 Tab, Rfl: 5 
  Aspirin, Buffered 81 mg tab, Take by mouth. Indications: prevention of transient ischemic attack, Disp: , Rfl:  
  multivitamin (ONE A DAY) tablet, Take 1 tablet by mouth daily. , Disp: , Rfl:   
Date Last Reviewed:  4/19/2021 Number of Personal Factors/Comorbidities that affect the Plan of Care: 1-2: MODERATE COMPLEXITY EXAMINATION:  
Observation/Orthostatic Postural Assessment:   
      Extensive bruising noted along medial aspect of right knee down into right calf. Palpation:   
      Increased tenderness along right IT band. ROM:   
      Right knee ROM within normal limits. Strength:   
      Bilateral lower extremity 5/5. Special Tests:   
      Straight leg raise negative bilateral  
Neurological Screen: 
      Sensation: Decreased right ankle/foot. Functional Mobility:  
      Gait/Ambulation:  Patient ambulates with normal gait pattern. Body Structures Involved: 1. Nerves 2. Muscles Body Functions Affected: 1. Neuromusculoskeletal Activities and Participation Affected: 1. General Tasks and Demands 2. Mobility 3. Community, Social and [place] Number of elements (examined above) that affect the Plan of Care: 4+: HIGH COMPLEXITY CLINICAL PRESENTATION:  
Presentation: Evolving clinical presentation with changing clinical characteristics: MODERATE COMPLEXITY CLINICAL DECISION MAKING:  
Use of outcome tool(s) and clinical judgement create a POC that gives a: Questionable prediction of patient's progress: MODERATE COMPLEXITY

## 2021-04-19 NOTE — PROGRESS NOTES
Melissa Persaud  : 1950  [place]  Secondary: [address] at [facility]  [address] [facility], [address] [place] [address],[address], [facility] [address]  [phone]   [phone]     OUTPATIENT PHYSICAL THERAPY: Daily Treatment Note 2021  Visit Count:  1    ICD-10: Treatment Diagnosis: Pain in right leg (M79.604)  Precautions/Allergies:   Patient has no known allergies. TREATMENT PLAN:  Effective Dates: 2021 TO 2021 (90 days). Frequency/Duration: 2 times a week for 90 Day(s)    Pre-treatment Symptoms/Complaints:  Increased right leg pain, increased muscular tightness  Pain: Initial: Pain Intensity 1: 0  Pain Location 1: Knee, Leg  Pain Orientation 1: Right  Post Session:  0/10   Medications Last Reviewed:  2021  Updated Objective Findings:  See evaluation note from today  TREATMENT:     MANUAL THERAPY: (15 minutes): Soft tissue mobilization was utilized and necessary because of the patient's painful spasm. Patient received trigger point release along right IT band to decrease pain. Date:  2021 Date:   Date:     Activity/Exercise Parameters Parameters Parameters   Standing IT band stretch HEP                                             [facility]  Treatment/Session Summary:    · Response to Treatment:  Decreased IT band tightness after treatment. · Communication/Consultation:  None today  · Equipment provided today:  Home exercise program  · Recommendations/Intent for next treatment session: Next visit will focus on IT band stretching, strengthening, and manual therapy.     Total Treatment Billable Duration:  15 minutes+ evaluation  PT Patient Time In/Time Out  Time In: 1500  Time Out: MIKEY Neil    Future Appointments   Date Time Provider Summer Molina   5/3/2021  1:00 PM Vani Ro PT [facility] SFHAMZAH   2021  1:00 PM Krystian Degroot PT [facility] HAMZAH   5/10/2021  1:00 PM Zane Sangita Crome, PT [facility] SFE   5/14/2021  1:00 PM Vissage, Sangita Crome, PT [facility]PT SFE   5/17/2021  8:30 AM Aiden Santana MD [facility]   5/17/2021  1:00 PM Vissage, Sangita Crome, PT [facility] SFE   5/21/2021  1:00 PM Vissage, Sangita Crome, PT [facility] SFE   5/24/2021  1:00 PM Vissage, Sangita Crome, PT [facility]PT SFE   5/28/2021  1:00 PM Torsten Izaguirre, PT [facility] SFE   12/27/2021  9:50 AM Alyse Magallanes MD PGUMAU PGU   3/7/2022  9:00 AM Aiden Santana MD [facility]

## 2021-04-27 ENCOUNTER — HOSPITAL ENCOUNTER (OUTPATIENT)
Dept: ULTRASOUND IMAGING | Age: 71
Discharge: HOME | End: 2021-04-27
Attending: FAMILY MEDICINE
Payer: MEDICARE

## 2021-04-27 DIAGNOSIS — M79.604 RIGHT LEG PAIN: ICD-10-CM

## 2021-04-27 DIAGNOSIS — M79.89 RIGHT LEG SWELLING: ICD-10-CM

## 2021-04-27 PROCEDURE — 93971 EXTREMITY STUDY: CPT

## 2021-05-03 ENCOUNTER — HOSPITAL ENCOUNTER (OUTPATIENT)
Dept: PHYSICAL THERAPY | Age: 71
Discharge: HOME | End: 2021-05-03
Attending: FAMILY MEDICINE
Payer: MEDICARE

## 2021-05-03 PROCEDURE — 97035 APP MDLTY 1+ULTRASOUND EA 15: CPT

## 2021-05-03 PROCEDURE — 97110 THERAPEUTIC EXERCISES: CPT

## 2021-05-03 PROCEDURE — 97140 MANUAL THERAPY 1/> REGIONS: CPT

## 2021-05-03 NOTE — PROGRESS NOTES
Juliet Fernández  : 1950  [place]  Secondary: [address] at [address] [facility]  [address] [facility], [address] [place] [address],[address], [place] [address].  [phone]   [phone]     OUTPATIENT PHYSICAL THERAPY: Daily Treatment Note 5/3/2021  Visit Count:  2    ICD-10: Treatment Diagnosis: Pain in right leg (M79.604)  Precautions/Allergies:   Patient has no known allergies. TREATMENT PLAN:  Effective Dates: 2021 TO 2021 (90 days). Frequency/Duration: 2 times a week for 90 Day(s)    Pre-treatment Symptoms/Complaints:  \" I think I am slowly getting better. It hurts worse at night. My ankle/foot feels full\". Pain: Initial: Pain Intensity 1: 2  Pain Location 1: Knee, Leg  Pain Orientation 1: Right  Post Session:  0/10   Medications Last Reviewed:  5/3/2021  Updated Objective Findings:  None Today  TREATMENT:     THERAPEUTIC EXERCISE: (20 minutes):  Exercises per grid below to improve mobility and strength. Required minimal verbal cues to promote proper body alignment. Progressed repetitions as indicated. Date:  2021 Date:  5/3/2021 Date:     Activity/Exercise Parameters Parameters Parameters   Standing IT band stretch HEP 3x30 sec     Nustep  Level 3 x 8 minutes    Gastroc stretch on incline board  3x30 sec     Wall squats with red ball  2x10 reps    Supine straight raise  2x10 reps                  MODALITIES: (10 minutes): *  Ultrasound was used today secondary to the patient having increased bruising and increased pain. Ultrasound was used today to reduce pain. Patient received 1MHZ at 1.5 W/cm2 to medial aspect of right knee. Skin intact after treatment. MANUAL THERAPY: (15 minutes): Soft tissue mobilization was utilized and necessary because of the patient's painful spasm. Patient received trigger point release along right quadriceps/right medial aspect of right knee to decrease pain.           [facility] Portal  Treatment/Session Summary:    · Response to Treatment: tolerated treatment well. · Communication/Consultation:  None today  · Equipment provided today:  None today  · Recommendations/Intent for next treatment session: Next visit will focus on IT band stretching, strengthening, ultrasound, and manual therapy.     Total Treatment Billable Duration:  45 minutes  PT Patient Time In/Time Out  Time In: 2879  Time Out: 826 [facility], PT    Future Appointments   Date Time Provider Summer Molina   5/7/2021  1:00 PM Jeremy Allan, PT [facility] SFE   5/10/2021  1:00 PM VisgeAlkalia Edward, PT [facility] SFE   5/14/2021  1:00 PM Vissage, Alkalia Edward, PT [facility] SFE   5/17/2021  8:30 AM MD CAMRYN Muñoz [facility]   5/17/2021  1:00 PM VisAlka schumacherlia Edward, PT [facility] SFE   5/21/2021  1:00 PM Vissage Artelia Edward, PT [facility] SFE   5/24/2021  1:00 PM Vissage, Artelia Edward, PT [facility] SFE   5/28/2021  1:00 PM Jeremy Allan, PT [facility] SFE   12/27/2021  9:50 AM Ze Chaparro MD [facility] PGU   3/7/2022  9:00 AM MD CAMRYN Muñoz [facility]

## 2021-05-07 ENCOUNTER — HOSPITAL ENCOUNTER (OUTPATIENT)
Dept: PHYSICAL THERAPY | Age: 71
Discharge: HOME | End: 2021-05-07
Attending: FAMILY MEDICINE
Payer: MEDICARE

## 2021-05-07 PROCEDURE — 97110 THERAPEUTIC EXERCISES: CPT

## 2021-05-07 PROCEDURE — 97035 APP MDLTY 1+ULTRASOUND EA 15: CPT

## 2021-05-07 NOTE — PROGRESS NOTES
Merry Bernal  : 1950  [place]  Secondary: [address] at [facility]  [facility], [address], [facility] [address]  [phone]   [phone]     OUTPATIENT PHYSICAL THERAPY: Daily Treatment Note 2021  Visit Count:  3    ICD-10: Treatment Diagnosis: Pain in right leg (M79.604)  Precautions/Allergies:   Patient has no known allergies. TREATMENT PLAN:  Effective Dates: 2021 TO 2021 (90 days). Frequency/Duration: 2 times a week for 90 Day(s)    Pre-treatment Symptoms/Complaints:  \" It is getting better. I am going to try and play golf on Tuesday. Swelling in my ankle is getting better\". Pain: Initial: Pain Intensity 1: 1  Pain Location 1: Knee, Leg  Pain Orientation 1: Right  Post Session:  0/10   Medications Last Reviewed:  2021  Updated Objective Findings:  None Today  TREATMENT:     THERAPEUTIC EXERCISE: (35 minutes):  Exercises per grid below to improve mobility and strength. Required minimal verbal cues to promote proper body alignment. Progressed repetitions as indicated. Date:  2021 Date:  5/3/2021 Date:  21   Activity/Exercise Parameters Parameters Parameters   Standing IT band stretch HEP 3x30 sec  X   Nustep  Level 3 x 8 minutes Level 4 x 12 minutes   Gastroc stretch on incline board  3x30 sec  3x30 sec    Wall squats with red ball  2x10 reps 2x10 reps   Supine straight raise  2x10 reps 2x10 reps 3#   Step ups   6 inch   Forward 20 reps  Lateral 20 reps   Lunges   2x10 reps   LAQs   3# 2x10 reps   SAQs   3# 2x10 reps                 MODALITIES: (10 minutes): *  Ultrasound was used today secondary to the patient having increased bruising and increased pain. Ultrasound was used today to reduce pain. Patient received 1MHZ at 1.5 W/cm2 to medial aspect of right knee. Skin intact after treatment.     [facility] Portal  Treatment/Session Summary:    · Response to Treatment: Patient tolerated exercises without complaints of increased pain. Patient progressing well with therapy. · Communication/Consultation:  None today  · Equipment provided today:  None today  · Recommendations/Intent for next treatment session: Next visit will focus on IT band stretching, strengthening, ultrasound, and manual therapy.     Total Treatment Billable Duration:  45 minutes  PT Patient Time In/Time Out  Time In: 9559  Time Out: 826 [facility],     Future Appointments   Date Time Provider Summer Molina   5/10/2021  1:00 PM [facility]   5/14/2021  1:00 PM Mariusz Degroot, PT [facility] SFE   5/17/2021  8:30 AM MD CAMRYN Tomlinson [facility]   5/17/2021  1:00 PM Mariusz Degroot, PT [facility] SFE   5/21/2021  1:00 PM Mariusz Degroot, PT [facility] SFE   5/24/2021  1:00 PM Mariusz Degroot, PT [facility] SFE   5/28/2021  1:00 PM Mode Damico, PT [facility] SFE   12/27/2021  9:45 AM Rodri Curran MD [facility] PGU   3/7/2022  9:00 AM MD CAMRYN Tomlinson [facility]

## 2021-05-14 ENCOUNTER — HOSPITAL ENCOUNTER (OUTPATIENT)
Dept: PHYSICAL THERAPY | Age: 71
Discharge: HOME | End: 2021-05-14
Attending: FAMILY MEDICINE
Payer: MEDICARE

## 2021-05-14 PROCEDURE — 97110 THERAPEUTIC EXERCISES: CPT

## 2021-05-14 NOTE — PROGRESS NOTES
Jayleen Massey  : 1950  [place]  Secondary: [address] at [facility] [place] [address] [facility], [address] [place] [address],[address], [address] [facility]  [phone]   [phone]     OUTPATIENT PHYSICAL THERAPY: Daily Treatment Note 2021  Visit Count:  4    ICD-10: Treatment Diagnosis: Pain in right leg (M79.604)  Precautions/Allergies:   Patient has no known allergies. TREATMENT PLAN:  Effective Dates: 2021 TO 2021 (90 days). Frequency/Duration: 2 times a week for 90 Day(s)    Pre-treatment Symptoms/Complaints:  \" I went to the [facility] yesterday. I played golf on Tuesday and the knee was a little twitchy. I am going to play golf tomorrow\". Pain: Initial: Pain Intensity 1: 0  Pain Location 1: Knee  Post Session:  0/10   Medications Last Reviewed:  2021  Updated Objective Findings:  None Today  TREATMENT:     THERAPEUTIC EXERCISE: (40 minutes):  Exercises per grid below to improve mobility and strength. Required minimal verbal cues to promote proper body alignment. Progressed repetitions as indicated.        Date:  2021 Date:  5/3/2021 Date:  21   Activity/Exercise Parameters Parameters Parameters   Standing IT band stretch 3x30 sec  3x30 sec  X   Nustep Level 4 x 12 minutes Level 3 x 8 minutes Level 4 x 12 minutes   Gastroc stretch on incline board 3x30 sec  3x30 sec  3x30 sec    Wall squats with red ball 2x10 reps 2x10 reps 2x10 reps   Supine straight raise X 2x10 reps 2x10 reps 3#   Step ups 8 inch   Forward 20 reps  Lateral 20 reps  6 inch   Forward 20 reps  Lateral 20 reps   Lunges/ lunge walking 2x10 reps/ 2 laps  2x10 reps   LAQs 5# 2x10 reps  3# 2x10 reps   SAQs X  3# 2x10 reps   Monster walk Red theraband 2x10 reps     Sidestepping hallway Red theraband 2x10 reps           [facility] Portal  Treatment/Session Summary:    · Response to Treatment: Patient tolerated exercises without complaints of increased knee pain. Patient progressing well with therapy. · Communication/Consultation:  None today  · Equipment provided today:  None today  · Recommendations/Intent for next treatment session: Next visit will focus on IT band stretching, strengthening, ultrasound, and manual therapy.     Total Treatment Billable Duration:  40 minutes  PT Patient Time In/Time Out  Time In: 1300  Time Out: 826 [place],     Future Appointments   Date Time Provider Summer Tracy   5/17/2021  8:30 AM MD CAMRYN [facility] HTF   5/17/2021  1:00 PM Fawn Degroot, PT [facility] SFE   5/21/2021  1:00 PM Fawn Degroot, PT [facility] SFE   5/24/2021  1:00 PM Fawn Degroot, PT [facility] SFE   5/28/2021  1:00 PM Lezlie Dakin, PT [facility] SFE   12/27/2021  9:45 AM Guerline Alarcon MD [facility] PGU   3/7/2022  9:00 AM Carmella Ricardo MD [facility] HTF HTF

## 2021-05-17 ENCOUNTER — HOSPITAL ENCOUNTER (OUTPATIENT)
Dept: PHYSICAL THERAPY | Age: 71
Discharge: HOME | End: 2021-05-17
Attending: FAMILY MEDICINE
Payer: MEDICARE

## 2021-05-17 PROCEDURE — 97035 APP MDLTY 1+ULTRASOUND EA 15: CPT

## 2021-05-17 PROCEDURE — 97110 THERAPEUTIC EXERCISES: CPT

## 2021-05-17 NOTE — PROGRESS NOTES
Laura Thomas  : 1950  [place]  Secondary: [address] at [address] [facility]  [address] [facility], [address] [place] [address],[address], [place] [address].  [phone]   [phone]     OUTPATIENT PHYSICAL THERAPY: Daily Treatment Note 2021  Visit Count:  5    ICD-10: Treatment Diagnosis: Pain in right leg (M79.604)  Precautions/Allergies:   Patient has no known allergies. TREATMENT PLAN:  Effective Dates: 2021 TO 2021 (90 days). Frequency/Duration: 2 times a week for 90 Day(s)    Pre-treatment Symptoms/Complaints:  \" I played golf on Saturday and my knee felt good. I got on my knee in the closet to get something and it really irritated it\". Pain: Initial: Pain Intensity 1: 1  Pain Location 1: Knee  Pain Orientation 1: Right  Post Session:  0/10   Medications Last Reviewed:  2021  Updated Objective Findings:  None Today  TREATMENT:     THERAPEUTIC EXERCISE: (35 minutes):  Exercises per grid below to improve mobility and strength. Required minimal verbal cues to promote proper body alignment. Progressed repetitions as indicated.        Date:  2021 Date:  2021 Date:  21   Activity/Exercise Parameters Parameters Parameters   Standing IT band stretch 3x30 sec  X X   Nustep Level 4 x 12 minutes Level 4 x 12 minutes Level 4 x 12 minutes   Gastroc stretch on incline board 3x30 sec  3x30 sec  3x30 sec    Wall squats with red ball 2x10 reps 2x10 reps 2x10 reps   Supine straight raise X X 2x10 reps 3#   Step ups 8 inch   Forward 20 reps  Lateral 20 reps 8 inch   Forward 20 reps  Lateral 20 reps 6 inch   Forward 20 reps  Lateral 20 reps   Lunges/ lunge walking 2x10 reps/ 2 laps X 2x10 reps   LAQs 5# 2x10 reps Nautilus 40#   20 reps 3# 2x10 reps   SAQs X X 3# 2x10 reps   Monster walk Red theraband 2x10 reps Red theraband 2x10 reps    Sidestepping hallway Red theraband 2x10 reps Red theraband 2x10 reps      MODALITIES: (10 minutes): *  Ultrasound was used today secondary to the patient having increased bruising and increased pain. Ultrasound was used today to reduce pain. Patient received 1MHZ at 1.5 W/cm2 to medial aspect of right knee. Skin intact after treatment. [employer] Portal  Treatment/Session Summary:    · Response to Treatment: Patient reports mild discomfort with Nautilus leg extension. Patient reports no increase in pain after treatment. · Communication/Consultation:  None today  · Equipment provided today:  None today  · Recommendations/Intent for next treatment session: Next visit will focus on IT band stretching, strengthening, ultrasound, and manual therapy.     Total Treatment Billable Duration:  45 minutes  PT Patient Time In/Time Out  Time In: 1646  Time Out: [facility],     Future Appointments   Date Time Provider Summer Molina   5/21/2021  1:00 PM [facility]   5/24/2021  1:00 PM Tor[facility]irre PT [facility]   5/28/2021  1:00 PM Torsten Izaguirre PT [facility]   12/27/2021  9:45 AM Alyse Magallanes MD [facility]   3/7/2022  9:00 AM Aiden Santana MD [facility]

## 2021-05-21 ENCOUNTER — HOSPITAL ENCOUNTER (OUTPATIENT)
Dept: PHYSICAL THERAPY | Age: 71
Discharge: HOME | End: 2021-05-21
Attending: FAMILY MEDICINE
Payer: MEDICARE

## 2021-05-21 PROCEDURE — 97110 THERAPEUTIC EXERCISES: CPT

## 2021-05-21 NOTE — PROGRESS NOTES
Alyssa Mojica  : 1950  [place]  Secondary: [address] at [address] [facility]  2700 [facility], [address] [place] [address],[address], [place] [address].  [phone]   [phone]     OUTPATIENT PHYSICAL THERAPY: Daily Treatment Note 2021  Visit Count:  6    ICD-10: Treatment Diagnosis: Pain in right leg (M79.604)  Precautions/Allergies:   Patient has no known allergies. TREATMENT PLAN:  Effective Dates: 2021 TO 2021 (90 days). Frequency/Duration: 2 times a week for 90 Day(s)    Pre-treatment Symptoms/Complaints:  \" I played golf on Thursday and it felt pretty good afterward. I am going to play golf again tomorrow\". Pain: Initial: Pain Intensity 1: 0  Pain Location 1: Knee  Pain Orientation 1: Right  Post Session:  0/10   Medications Last Reviewed:  2021  Updated Objective Findings:  None Today  TREATMENT:     THERAPEUTIC EXERCISE: (40 minutes):  Exercises per grid below to improve mobility and strength. Required minimal verbal cues to promote proper body alignment. Progressed repetitions as indicated.        Date:  2021 Date:  2021 Date:  21   Activity/Exercise Parameters Parameters Parameters   Standing IT band stretch 3x30 sec  X X   Nustep Level 4 x 12 minutes Level 4 x 12 minutes Level 5 x 12 minutes   Gastroc stretch on incline board 3x30 sec  3x30 sec  3x30 sec    Wall squats with red ball 2x10 reps 2x10 reps 2x10 reps   Supine straight raise X X 2x10 reps 3#   Step ups 8 inch   Forward 20 reps  Lateral 20 reps 8 inch   Forward 20 reps  Lateral 20 reps 8 inch   Forward 20 reps  Lateral 20 reps   Lunges/ lunge walking 2x10 reps/ 2 laps X 2x10 reps   LAQs 5# 2x10 reps Nautilus 40#   20 reps Nautilus 40#   20 reps   SAQs X X 3# 2x10 reps   Monster walk Red theraband 2x10 reps Red theraband 2x10 reps Green theraband 2x10 reps   Sidestepping hallway Red theraband 2x10 reps Red theraband 2x10 reps Patricio Escamilla saleem 2x10 reps     [facility] Portal  Treatment/Session Summary:    · Response to Treatment: Patient progressing well with exercises. Patient reports no increase in knee pain with exercises. · Communication/Consultation:  None today  · Equipment provided today:  None today  · Recommendations/Intent for next treatment session: Next visit will focus on IT band stretching, strengthening, ultrasound, and manual therapy.     Total Treatment Billable Duration:  40 minutes  PT Patient Time In/Time Out  Time In: 1300  Time Out: 4[address] [facility], PT    Future Appointments   Date Time Provider Summer Molina   5/24/2021  1:00 PM [facility]   5/28/2021  1:00 PM Sandee Keene PT [facility] SFE   12/27/2021  9:45 AM Scott Daily MD [facility]   3/7/2022  9:00 AM Lourdes Bennett MD [facility] HTF HTF

## 2021-05-24 ENCOUNTER — HOSPITAL ENCOUNTER (OUTPATIENT)
Dept: PHYSICAL THERAPY | Age: 71
Discharge: HOME | End: 2021-05-24
Attending: FAMILY MEDICINE
Payer: MEDICARE

## 2021-05-24 PROCEDURE — 97110 THERAPEUTIC EXERCISES: CPT

## 2021-05-24 NOTE — PROGRESS NOTES
Juliet Fernández  : 1950  [place]  Secondary: [address] at [facility]  [address], [address], [address] [facility]  [phone]   [phone]     OUTPATIENT PHYSICAL THERAPY: Daily Treatment Note 2021  Visit Count:  7    ICD-10: Treatment Diagnosis: Pain in right leg (M79.604)  Precautions/Allergies:   Patient has no known allergies. TREATMENT PLAN:  Effective Dates: 2021 TO 2021 (90 days). Frequency/Duration: 2 times a week for 90 Day(s)    Pre-treatment Symptoms/Complaints:  \" I have been doing good. I am feeling well\". Pain: Initial: Pain Intensity 1: 0  Pain Location 1: Knee  Pain Orientation 1: Right  Post Session:  0/10   Medications Last Reviewed:  2021  Updated Objective Findings:  None Today  TREATMENT:     THERAPEUTIC EXERCISE: (40 minutes):  Exercises per grid below to improve mobility and strength. Required minimal verbal cues to promote proper body alignment. Progressed repetitions as indicated.        Date:  2021 Date:  2021 Date:  21   Activity/Exercise Parameters Parameters Parameters   Standing IT band stretch X X X   Nustep Level 5 x 12 minutes Level 4 x 12 minutes Level 5 x 12 minutes   Gastroc stretch on incline board 3x30 sec  3x30 sec  3x30 sec    Wall squats with red ball 2x10 reps 2x10 reps 2x10 reps   Supine straight raise X X 2x10 reps 3#   Step ups 8 inch   Forward 20 reps  Lateral 20 reps 8 inch   Forward 20 reps  Lateral 20 reps 8 inch   Forward 20 reps  Lateral 20 reps   Lunges 2x10 reps X 2x10 reps   LAQs Nautilus 45#   20 reps Nautilus 40#   20 reps Nautilus 40#   20 reps   SAQs X X 3# 2x10 reps   Monster walk Blue theraband 2x10 reps Red theraband 2x10 reps Green theraband 2x10 reps   Nautilus leg curls 45# 2x10 reps     Sidestepping hallway Blue theraband 2x10 reps Red theraband 2x10 reps Green theraband 2x10 reps     [employer] Portal  Treatment/Session Summary:    · Response to Treatment: Patient tolerated exercises with no complaints of increased pain. · Communication/Consultation:  None today  · Equipment provided today:  None today  · Recommendations/Intent for next treatment session: Next visit will focus on IT band stretching, strengthening, ultrasound, and manual therapy. Will discharge after next appointment.      Total Treatment Billable Duration:  40 minutes  PT Patient Time In/Time Out  Time In: 7565  Time Out: [address], PT    Future Appointments   Date Time Provider Summer Molina   5/28/2021  1:00 PM Loretta Wilson PT [facility]   12/27/2021  9:45 AM Victoriano Reyes MD [facility]   3/7/2022  9:00 AM Kushal Rodríguez MD [facility] HTF

## 2021-05-28 ENCOUNTER — HOSPITAL ENCOUNTER (OUTPATIENT)
Dept: PHYSICAL THERAPY | Age: 71
Discharge: HOME | End: 2021-05-28
Attending: FAMILY MEDICINE
Payer: MEDICARE

## 2021-05-28 PROCEDURE — 97110 THERAPEUTIC EXERCISES: CPT

## 2021-05-28 NOTE — THERAPY DISCHARGE
Alyssa Mojica : 1950 [place] Secondary: [address] at [facility] [address] [facility], [address], [place] [address]. [phone]   [phone] OUTPATIENT PHYSICAL THERAPY:Discharge Summary 2021 ICD-10: Treatment Diagnosis: Pain in right leg (M79.604) Precautions/Allergies:  
Patient has no known allergies. TREATMENT PLAN: 
Effective Dates: 2021 TO 2021 (90 days). Frequency/Duration: Patient discharged from physical therapy due to meeting all of his goals. MEDICAL/REFERRING DIAGNOSIS: 
Pain in right leg [M79.604] DATE OF ONSET: 2021 REFERRING PHYSICIAN: Fili Arias MD MD Orders: Evaluate and treat Return MD Appointment: unknown INITIAL ASSESSMENT:  Mr. Axel Herndon presents with increased pain, increased muscular tightness, and difficulty walking. Patient would benefit from skilled physical therapy to address problems and goals. Thank you for this referral.    
Discharge note:  Patient attended eight scheduled physical therapy appointments from 2021 to 2021. Patient reports no knee pain with daily activities. Patient has returned to his gym. Patient discharged due to meeting all of his goals. Thank you for this referral.    
PROBLEM LIST (Impacting functional limitations): 1. Decreased Ambulation Ability/Technique 2. Increased Pain 3. Decreased Flexibility/Joint Mobility 4. Decreased [place] with Home Exercise Program 
5. Increased muscular tightness INTERVENTIONS PLANNED: (Treatment may consist of any combination of the following) 1. Cold 2. Heat 3. Home Exercise Program (HEP) 4. Manual Therapy 5. Range of Motion (ROM) 6. Therapeutic Exercise/Strengthening 7. Ultrasound (US)  
 
GOALS: (Goals have been discussed and agreed upon with patient.) Short-Term Functional Goals: Time Frame: 30 days 1.  Patient will be independent with home exercise program to decrease pain. Goal met. Discharge Goals: Time Frame: 90 days 1. Patient will score greater than or equal to 77 on Lower Extremity Functional Scale indicating improvement. Goal met. 2. Patient will report being able to return to his gym with no complaints of increased pain. Goal met. 3. Patient will report being able to sleep through the night without pain. Goal met. OUTCOME MEASURE:  
Tool Used: Lower Extremity Functional Scale (LEFS) Score:  Initial: 71/80 Most Recent: 80/80 (Date: 5-28-21 ) Interpretation of Score: 20 questions each scored on a 5 point scale with 0 representing \"extreme difficulty or unable to perform\" and 4 representing \"no difficulty\". The lower the score, the greater the functional disability. 80/80 represents no disability. Minimal detectable change is 9 points. PAIN/SUBJECTIVE:  
Initial: Pain Intensity 1: 0  Post Session:  0/10 HISTORY:  
History of Injury/Illness (Reason for Referral): 
Patient reports right leg pain began on April 12, 2021. Patient reports he was cleaning the pollen off his porch that day. That night patient reports having deep pain along the middle of his right thigh. Patient rates current pain level as 0/10 and pain level as night as 2/10. Patient reports having difficulty sleeping at night due to the discomfort. Aggravating factors are squatting. Past Medical History/Comorbidities:  
Mr. Ehsan Rice  has a past medical history of Allergy desensitization therapy, Backache, unspecified (10/24/2016), Cervicalgia (10/24/2016), H/O complete eye exam (Fall 2019), Hypertension, Pain in joint, multiple sites (10/24/2016), Positive DARRIUS (antinuclear antibody) (10/24/2016), Shingles (1990's), and Toe fracture, left (2019). He also has no past medical history of Difficult intubation, Malignant hyperthermia due to anesthesia, Nausea & vomiting, or Pseudocholinesterase deficiency.   Mr. Ehsan Rice  has a past surgical history that includes hx cyst removal (1969); hx colonoscopy (2011); hx other surgical; hx heent (1954); hx orthopaedic (1980); and hx orthopaedic (02/2018). Social History/Living Environment:  
  Lives with his spouse. Prior Level of Function/Work/Activity: 
Independent. Retired. Works out at [employer] 2-3 times a week and plays golf 3 times a week. Dominant Side:  
      RIGHT Personal Factors:   
      Sex:  male Age:  79 y.o. Ambulatory/Rehab Services H2 Model Falls Risk Assessment Risk Factors: 
     (1)  Gender [Male] Ability to Rise from Chair: 
     (0)  Ability to rise in a single movement Falls Prevention Plan: No modifications necessary Total: (5 or greater = High Risk): 1 ©2010 [facility] [address] [place] [address] Patent #[id number]. Federal Law prohibits the replication, distribution or use without written permission from [facility] [employer] Current Medications:   
  
Current Outpatient Medications:  
  tamsulosin (FLOMAX) 0.4 mg capsule, Take 1 Cap by mouth daily. , Disp: 90 Cap, Rfl: 3 
  multivitamin (ONE A DAY) tablet, Take 1 tablet by mouth daily. , Disp: , Rfl:   
Date Last Reviewed:  5/28/2021 Number of Personal Factors/Comorbidities that affect the Plan of Care: 1-2: MODERATE COMPLEXITY EXAMINATION:  
Observation/Orthostatic Postural Assessment:   
      Extensive bruising noted along medial aspect of right knee down into right calf. Palpation:   
      Increased tenderness along right IT band. ROM:   
      Right knee ROM within normal limits. Strength:   
      Bilateral lower extremity 5/5. Special Tests:   
      Straight leg raise negative bilateral  
Neurological Screen: 
      Sensation: Decreased right ankle/foot. Functional Mobility:  
      Gait/Ambulation:  Patient ambulates with normal gait pattern. Body Structures Involved: 1. Nerves 2. Muscles Body Functions Affected: 1.  Neuromusculoskeletal Activities and Participation Affected: 1. General Tasks and Demands 2. Mobility 3. Community, Social and [place] Number of elements (examined above) that affect the Plan of Care: 4+: HIGH COMPLEXITY CLINICAL PRESENTATION:  
Presentation: Evolving clinical presentation with changing clinical characteristics: MODERATE COMPLEXITY CLINICAL DECISION MAKING:  
Use of outcome tool(s) and clinical judgement create a POC that gives a: Questionable prediction of patient's progress: MODERATE COMPLEXITY

## 2021-05-28 NOTE — PROGRESS NOTES
Ian Douse  : 1950  [place]  Secondary: [address] at [facility]  27[address] [facility], [address] [place] [address] 83[address], [address] [facility]  [phone]   [phone]     OUTPATIENT PHYSICAL THERAPY: Daily Treatment Note 2021  Visit Count:  8    ICD-10: Treatment Diagnosis: Pain in right leg (M79.604)  Precautions/Allergies:   Patient has no known allergies. TREATMENT PLAN:  Effective Dates: 2021 TO 2021 (90 days). Frequency/Duration: 2 times a week for 90 Day(s). Patient discharged from physical therapy due to meeting all of his goals. Pre-treatment Symptoms/Complaints:  Patient reports knee is almost back to normal.    Pain: Initial: Pain Intensity 1: 0  Post Session:  0/10   Medications Last Reviewed:  2021  Updated Objective Findings:  See discharge note  TREATMENT:     THERAPEUTIC EXERCISE: (40 minutes):  Exercises per grid below to improve mobility and strength. Required minimal verbal cues to promote proper body alignment. Progressed repetitions as indicated.        Date:  2021 Date:  2021 Date:  21   Activity/Exercise Parameters Parameters Parameters   Standing IT band stretch X X X   Nustep Level 5 x 12 minutes Level 5 x 12 minutes Level 5 x 12 minutes   Gastroc stretch on incline board 3x30 sec  3x30 sec  3x30 sec    Wall squats with red ball 2x10 reps 2x10 reps 2x10 reps   Supine straight raise X X 2x10 reps 3#   Step ups 8 inch   Forward 20 reps  Lateral 20 reps 8 inch   Forward 20 reps  Lateral 20 reps 8 inch   Forward 20 reps  Lateral 20 reps   Lunges 2x10 reps X 2x10 reps   LAQs Nautilus 45#   20 reps Nautilus 40#   20 reps Nautilus 40#   20 reps   SAQs X X 3# 2x10 reps   Monster walk Blue theraband 2x10 reps Blue theraband 2x10 reps Green theraband 2x10 reps   Nautilus leg curls 45# 2x10 reps 45# 2x10 reps    Sidestepping hallway Blue theraband 2x10 reps Blue theraband 2x10 reps Green theraband 2x10 reps     [employer] Portal  Treatment/Session Summary:    · Response to Treatment: Patient tolerated treatment without complaints or issues. · Communication/Consultation:  None today  · Equipment provided today:  None today  · Recommendations/Intent for next treatment session: Patient discharged from physical therapy due to meeting all of his goals.      Total Treatment Billable Duration:  40 minutes  PT Patient Time In/Time Out  Time In: 1300  Time Out: 4[address] [facility], PT    Future Appointments   Date Time Provider hospitals   12/27/2021  9:45 AM Prashant Horne MD [facility]   3/7/2022  9:00 AM Marlyn Sofia MD [facility] HTF HTF

## 2022-09-20 ENCOUNTER — PATIENT MESSAGE (OUTPATIENT)
Dept: FAMILY MEDICINE CLINIC | Facility: CLINIC | Age: 72
End: 2022-09-20

## 2022-09-20 RX ORDER — LISINOPRIL 10 MG/1
10 TABLET ORAL DAILY
Qty: 90 TABLET | Refills: 3 | Status: SHIPPED | OUTPATIENT
Start: 2022-09-20

## 2022-09-20 NOTE — TELEPHONE ENCOUNTER
From: Linn Lynn  To: Dr. Yelitza Echavarria  Sent: 9/20/2022 8:44 AM EDT  Subject: Lisinopril 10mg    I no longer take Tamsulosin. Can you refill my Rx for Lisinopril 10mg for my blood pressure?

## 2022-12-11 ENCOUNTER — PATIENT MESSAGE (OUTPATIENT)
Dept: FAMILY MEDICINE CLINIC | Facility: CLINIC | Age: 72
End: 2022-12-11

## 2022-12-12 RX ORDER — NIRMATRELVIR AND RITONAVIR 300-100 MG
KIT ORAL
Qty: 30 TABLET | Refills: 0 | Status: SHIPPED | OUTPATIENT
Start: 2022-12-12 | End: 2022-12-17

## 2022-12-12 NOTE — TELEPHONE ENCOUNTER
From: Excell See  To: Dr. Abby Drake  Sent: 12/11/2022 4:29 PM EST  Subject: Tested positive for Covid on 12/9    Tested positive on Friday 12/9. Have been taking Advil and Mucenex DM. Have been told that a Rx for Paxlovid would help with symptoms. Your thoughts and if helpful, please send in a Rx for me.   Thank you,  Jan Zelaya

## 2023-02-26 ENCOUNTER — APPOINTMENT (OUTPATIENT)
Dept: CT IMAGING | Age: 73
DRG: 189 | End: 2023-02-26
Payer: MEDICARE

## 2023-02-26 ENCOUNTER — HOSPITAL ENCOUNTER (INPATIENT)
Age: 73
LOS: 3 days | Discharge: HOME | DRG: 189 | End: 2023-03-02
Attending: STUDENT IN AN ORGANIZED HEALTH CARE EDUCATION/TRAINING PROGRAM | Admitting: FAMILY MEDICINE
Payer: MEDICARE

## 2023-02-26 ENCOUNTER — APPOINTMENT (OUTPATIENT)
Dept: GENERAL RADIOLOGY | Age: 73
DRG: 189 | End: 2023-02-26
Payer: MEDICARE

## 2023-02-26 DIAGNOSIS — R06.2 WHEEZING: ICD-10-CM

## 2023-02-26 DIAGNOSIS — R09.02 HYPOXIA: Primary | ICD-10-CM

## 2023-02-26 DIAGNOSIS — J96.01 ACUTE RESPIRATORY FAILURE WITH HYPOXIA (HCC): ICD-10-CM

## 2023-02-26 DIAGNOSIS — I48.91 ATRIAL FIBRILLATION WITH RAPID VENTRICULAR RESPONSE (HCC): ICD-10-CM

## 2023-02-26 LAB
ALBUMIN SERPL-MCNC: 3.5 G/DL (ref 3.2–4.6)
ALBUMIN/GLOB SERPL: 1.1 (ref 0.4–1.6)
ALP SERPL-CCNC: 54 U/L (ref 50–136)
ALT SERPL-CCNC: 33 U/L (ref 12–65)
ANION GAP SERPL CALC-SCNC: 8 MMOL/L (ref 2–11)
AST SERPL-CCNC: 28 U/L (ref 15–37)
BASOPHILS # BLD: 0 K/UL (ref 0–0.2)
BASOPHILS NFR BLD: 0 % (ref 0–2)
BILIRUB SERPL-MCNC: 0.6 MG/DL (ref 0.2–1.1)
BUN SERPL-MCNC: 19 MG/DL (ref 8–23)
CALCIUM SERPL-MCNC: 8.3 MG/DL (ref 8.3–10.4)
CHLORIDE SERPL-SCNC: 98 MMOL/L (ref 101–110)
CO2 SERPL-SCNC: 23 MMOL/L (ref 21–32)
CREAT SERPL-MCNC: 1.28 MG/DL (ref 0.8–1.5)
DIFFERENTIAL METHOD BLD: ABNORMAL
EOSINOPHIL # BLD: 0 K/UL (ref 0–0.8)
EOSINOPHIL NFR BLD: 0 % (ref 0.5–7.8)
ERYTHROCYTE [DISTWIDTH] IN BLOOD BY AUTOMATED COUNT: 13.1 % (ref 11.9–14.6)
GLOBULIN SER CALC-MCNC: 3.2 G/DL (ref 2.8–4.5)
GLUCOSE SERPL-MCNC: 150 MG/DL (ref 65–100)
HCT VFR BLD AUTO: 43.9 % (ref 41.1–50.3)
HGB BLD-MCNC: 14.9 G/DL (ref 13.6–17.2)
IMM GRANULOCYTES # BLD AUTO: 0 K/UL (ref 0–0.5)
IMM GRANULOCYTES NFR BLD AUTO: 0 % (ref 0–5)
LACTATE SERPL-SCNC: 1.9 MMOL/L (ref 0.4–2)
LYMPHOCYTES # BLD: 0.9 K/UL (ref 0.5–4.6)
LYMPHOCYTES NFR BLD: 8 % (ref 13–44)
MCH RBC QN AUTO: 31.1 PG (ref 26.1–32.9)
MCHC RBC AUTO-ENTMCNC: 33.9 G/DL (ref 31.4–35)
MCV RBC AUTO: 91.6 FL (ref 82–102)
MONOCYTES # BLD: 1.5 K/UL (ref 0.1–1.3)
MONOCYTES NFR BLD: 13 % (ref 4–12)
NEUTS SEG # BLD: 8.6 K/UL (ref 1.7–8.2)
NEUTS SEG NFR BLD: 78 % (ref 43–78)
NRBC # BLD: 0 K/UL (ref 0–0.2)
NT PRO BNP: 286 PG/ML (ref 5–125)
PLATELET # BLD AUTO: 150 K/UL (ref 150–450)
PMV BLD AUTO: 10.4 FL (ref 9.4–12.3)
POTASSIUM SERPL-SCNC: 4.1 MMOL/L (ref 3.5–5.1)
PROCALCITONIN SERPL-MCNC: 0.88 NG/ML (ref 0–0.49)
PROT SERPL-MCNC: 6.7 G/DL (ref 6.3–8.2)
RBC # BLD AUTO: 4.79 M/UL (ref 4.23–5.6)
SODIUM SERPL-SCNC: 129 MMOL/L (ref 133–143)
WBC # BLD AUTO: 11 K/UL (ref 4.3–11.1)

## 2023-02-26 PROCEDURE — 2580000003 HC RX 258: Performed by: STUDENT IN AN ORGANIZED HEALTH CARE EDUCATION/TRAINING PROGRAM

## 2023-02-26 PROCEDURE — 99285 EMERGENCY DEPT VISIT HI MDM: CPT

## 2023-02-26 PROCEDURE — 80053 COMPREHEN METABOLIC PANEL: CPT

## 2023-02-26 PROCEDURE — 6360000004 HC RX CONTRAST MEDICATION: Performed by: STUDENT IN AN ORGANIZED HEALTH CARE EDUCATION/TRAINING PROGRAM

## 2023-02-26 PROCEDURE — 6360000002 HC RX W HCPCS: Performed by: STUDENT IN AN ORGANIZED HEALTH CARE EDUCATION/TRAINING PROGRAM

## 2023-02-26 PROCEDURE — 83605 ASSAY OF LACTIC ACID: CPT

## 2023-02-26 PROCEDURE — 71275 CT ANGIOGRAPHY CHEST: CPT

## 2023-02-26 PROCEDURE — 6370000000 HC RX 637 (ALT 250 FOR IP): Performed by: STUDENT IN AN ORGANIZED HEALTH CARE EDUCATION/TRAINING PROGRAM

## 2023-02-26 PROCEDURE — 84145 PROCALCITONIN (PCT): CPT

## 2023-02-26 PROCEDURE — 85025 COMPLETE CBC W/AUTO DIFF WBC: CPT

## 2023-02-26 PROCEDURE — 96365 THER/PROPH/DIAG IV INF INIT: CPT

## 2023-02-26 PROCEDURE — 87040 BLOOD CULTURE FOR BACTERIA: CPT

## 2023-02-26 PROCEDURE — 96361 HYDRATE IV INFUSION ADD-ON: CPT

## 2023-02-26 PROCEDURE — 83735 ASSAY OF MAGNESIUM: CPT

## 2023-02-26 PROCEDURE — 96368 THER/DIAG CONCURRENT INF: CPT

## 2023-02-26 PROCEDURE — 83880 ASSAY OF NATRIURETIC PEPTIDE: CPT

## 2023-02-26 PROCEDURE — 94640 AIRWAY INHALATION TREATMENT: CPT

## 2023-02-26 PROCEDURE — 93005 ELECTROCARDIOGRAM TRACING: CPT | Performed by: EMERGENCY MEDICINE

## 2023-02-26 PROCEDURE — 71045 X-RAY EXAM CHEST 1 VIEW: CPT

## 2023-02-26 RX ORDER — IPRATROPIUM BROMIDE AND ALBUTEROL SULFATE 2.5; .5 MG/3ML; MG/3ML
1 SOLUTION RESPIRATORY (INHALATION)
Status: COMPLETED | OUTPATIENT
Start: 2023-02-26 | End: 2023-02-26

## 2023-02-26 RX ORDER — SODIUM CHLORIDE 0.9 % (FLUSH) 0.9 %
10 SYRINGE (ML) INJECTION
Status: COMPLETED | OUTPATIENT
Start: 2023-02-26 | End: 2023-02-26

## 2023-02-26 RX ORDER — 0.9 % SODIUM CHLORIDE 0.9 %
1000 INTRAVENOUS SOLUTION INTRAVENOUS ONCE
Status: COMPLETED | OUTPATIENT
Start: 2023-02-26 | End: 2023-02-27

## 2023-02-26 RX ORDER — 0.9 % SODIUM CHLORIDE 0.9 %
100 INTRAVENOUS SOLUTION INTRAVENOUS
Status: COMPLETED | OUTPATIENT
Start: 2023-02-26 | End: 2023-02-26

## 2023-02-26 RX ORDER — IPRATROPIUM BROMIDE AND ALBUTEROL SULFATE 2.5; .5 MG/3ML; MG/3ML
1 SOLUTION RESPIRATORY (INHALATION)
Status: COMPLETED | OUTPATIENT
Start: 2023-02-27 | End: 2023-02-27

## 2023-02-26 RX ADMIN — SODIUM CHLORIDE, PRESERVATIVE FREE 10 ML: 5 INJECTION INTRAVENOUS at 23:02

## 2023-02-26 RX ADMIN — IOPAMIDOL 100 ML: 755 INJECTION, SOLUTION INTRAVENOUS at 23:01

## 2023-02-26 RX ADMIN — IPRATROPIUM BROMIDE AND ALBUTEROL SULFATE 1 AMPULE: 2.5; .5 SOLUTION RESPIRATORY (INHALATION) at 22:25

## 2023-02-26 RX ADMIN — SODIUM CHLORIDE 1000 ML: 9 INJECTION, SOLUTION INTRAVENOUS at 22:25

## 2023-02-26 RX ADMIN — AZITHROMYCIN MONOHYDRATE 500 MG: 500 INJECTION, POWDER, LYOPHILIZED, FOR SOLUTION INTRAVENOUS at 22:40

## 2023-02-26 RX ADMIN — CEFTRIAXONE 1000 MG: 1 INJECTION, POWDER, FOR SOLUTION INTRAMUSCULAR; INTRAVENOUS at 22:26

## 2023-02-26 RX ADMIN — SODIUM CHLORIDE 100 ML: 9 INJECTION, SOLUTION INTRAVENOUS at 23:01

## 2023-02-26 ASSESSMENT — PAIN - FUNCTIONAL ASSESSMENT
PAIN_FUNCTIONAL_ASSESSMENT: NONE - DENIES PAIN
PAIN_FUNCTIONAL_ASSESSMENT: 0-10

## 2023-02-26 ASSESSMENT — ENCOUNTER SYMPTOMS
COUGH: 1
SHORTNESS OF BREATH: 1

## 2023-02-26 ASSESSMENT — PAIN SCALES - GENERAL: PAINLEVEL_OUTOF10: 5

## 2023-02-27 ENCOUNTER — APPOINTMENT (OUTPATIENT)
Dept: GENERAL RADIOLOGY | Age: 73
DRG: 189 | End: 2023-02-27
Payer: MEDICARE

## 2023-02-27 ENCOUNTER — APPOINTMENT (OUTPATIENT)
Dept: NON INVASIVE DIAGNOSTICS | Age: 73
DRG: 189 | End: 2023-02-27
Payer: MEDICARE

## 2023-02-27 PROBLEM — J96.01 ACUTE RESPIRATORY FAILURE WITH HYPOXIA (HCC): Status: ACTIVE | Noted: 2023-02-27

## 2023-02-27 PROBLEM — J45.901 ACUTE ASTHMA EXACERBATION: Status: ACTIVE | Noted: 2023-02-27

## 2023-02-27 PROBLEM — E87.1 HYPONATREMIA: Status: ACTIVE | Noted: 2023-02-27

## 2023-02-27 PROBLEM — I48.91 ATRIAL FIBRILLATION WITH RAPID VENTRICULAR RESPONSE (HCC): Status: ACTIVE | Noted: 2023-02-27

## 2023-02-27 PROBLEM — J98.11 MILD BIBASILAR ATELECTASIS: Status: ACTIVE | Noted: 2023-02-27

## 2023-02-27 LAB
B PERT DNA SPEC QL NAA+PROBE: NOT DETECTED
BORDETELLA PARAPERTUSSIS BY PCR: NOT DETECTED
C PNEUM DNA SPEC QL NAA+PROBE: NOT DETECTED
ECHO AO ASC DIAM: 3.6 CM
ECHO AO ASCENDING AORTA INDEX: 1.61 CM/M2
ECHO AO ROOT DIAM: 3.5 CM
ECHO AO ROOT INDEX: 1.57 CM/M2
ECHO AV AREA PEAK VELOCITY: 3.6 CM2
ECHO AV AREA VTI: 3.5 CM2
ECHO AV AREA/BSA PEAK VELOCITY: 1.6 CM2/M2
ECHO AV AREA/BSA VTI: 1.6 CM2/M2
ECHO AV MEAN GRADIENT: 5 MMHG
ECHO AV MEAN GRADIENT: 5 MMHG
ECHO AV MEAN VELOCITY: 1.1 M/S
ECHO AV PEAK GRADIENT: 9 MMHG
ECHO AV PEAK VELOCITY: 1.5 M/S
ECHO AV VELOCITY RATIO: 0.73
ECHO AV VTI: 30.8 CM
ECHO BSA: 2.29 M2
ECHO IVC EXP: 2.1 CM
ECHO LA AREA 2C: 28.4 CM2
ECHO LA AREA 4C: 24.9 CM2
ECHO LA MAJOR AXIS: 6.5 CM
ECHO LA MINOR AXIS: 6.7 CM
ECHO LA VOL 2C: 100 ML (ref 18–58)
ECHO LA VOL 4C: 77 ML (ref 18–58)
ECHO LA VOL BP: 89 ML (ref 18–58)
ECHO LA VOL/BSA BIPLANE: 40 ML/M2 (ref 16–34)
ECHO LA VOLUME INDEX A2C: 45 ML/M2 (ref 16–34)
ECHO LA VOLUME INDEX A4C: 35 ML/M2 (ref 16–34)
ECHO LV E' LATERAL VELOCITY: 13 CM/S
ECHO LV E' SEPTAL VELOCITY: 8 CM/S
ECHO LV EDV A4C: 165 ML
ECHO LV EDV INDEX A4C: 74 ML/M2
ECHO LV EJECTION FRACTION A4C: 46 %
ECHO LV ESV A4C: 90 ML
ECHO LV ESV INDEX A4C: 40 ML/M2
ECHO LV FRACTIONAL SHORTENING: 22 % (ref 28–44)
ECHO LV INTERNAL DIMENSION DIASTOLE INDEX: 2.2 CM/M2
ECHO LV INTERNAL DIMENSION DIASTOLIC: 4.9 CM (ref 4.2–5.9)
ECHO LV INTERNAL DIMENSION SYSTOLIC INDEX: 1.7 CM/M2
ECHO LV INTERNAL DIMENSION SYSTOLIC: 3.8 CM
ECHO LV IVSD: 0.9 CM (ref 0.6–1)
ECHO LV MASS 2D: 153 G (ref 88–224)
ECHO LV MASS INDEX 2D: 68.6 G/M2 (ref 49–115)
ECHO LV POSTERIOR WALL DIASTOLIC: 0.9 CM (ref 0.6–1)
ECHO LV RELATIVE WALL THICKNESS RATIO: 0.37
ECHO LVOT AREA: 4.9 CM2
ECHO LVOT AV VTI INDEX: 0.71
ECHO LVOT DIAM: 2.5 CM
ECHO LVOT MEAN GRADIENT: 3 MMHG
ECHO LVOT PEAK GRADIENT: 5 MMHG
ECHO LVOT PEAK VELOCITY: 1.1 M/S
ECHO LVOT STROKE VOLUME INDEX: 48 ML/M2
ECHO LVOT SV: 107 ML
ECHO LVOT VTI: 21.8 CM
ECHO MV A VELOCITY: 0.75 M/S
ECHO MV AREA VTI: 4.5 CM2
ECHO MV E DECELERATION TIME (DT): 172 MS
ECHO MV E VELOCITY: 0.72 M/S
ECHO MV E/A RATIO: 0.96
ECHO MV E/E' LATERAL: 5.54
ECHO MV E/E' RATIO (AVERAGED): 7.27
ECHO MV E/E' SEPTAL: 9
ECHO MV LVOT VTI INDEX: 1.08
ECHO MV MAX VELOCITY: 0.7 M/S
ECHO MV MEAN GRADIENT: 1 MMHG
ECHO MV MEAN VELOCITY: 0.4 M/S
ECHO MV PEAK GRADIENT: 2 MMHG
ECHO MV VTI: 23.6 CM
ECHO PV ACCELERATION TIME (AT): 50 MS
ECHO PV MAX VELOCITY: 0.9 M/S
ECHO PV PEAK GRADIENT: 3 MMHG
ECHO RV BASAL DIMENSION: 3.2 CM
ECHO RV FREE WALL PEAK S': 13 CM/S
ECHO RV TAPSE: 1.8 CM (ref 1.7–?)
EKG ATRIAL RATE: 116 BPM
EKG DIAGNOSIS: NORMAL
EKG P AXIS: 41 DEGREES
EKG P-R INTERVAL: 126 MS
EKG Q-T INTERVAL: 304 MS
EKG QRS DURATION: 86 MS
EKG QTC CALCULATION (BAZETT): 422 MS
EKG R AXIS: 4 DEGREES
EKG T AXIS: 72 DEGREES
EKG VENTRICULAR RATE: 116 BPM
FLUAV SUBTYP SPEC NAA+PROBE: NOT DETECTED
FLUBV RNA SPEC QL NAA+PROBE: NOT DETECTED
HADV DNA SPEC QL NAA+PROBE: NOT DETECTED
HCOV 229E RNA SPEC QL NAA+PROBE: NOT DETECTED
HCOV HKU1 RNA SPEC QL NAA+PROBE: NOT DETECTED
HCOV NL63 RNA SPEC QL NAA+PROBE: NOT DETECTED
HCOV OC43 RNA SPEC QL NAA+PROBE: NOT DETECTED
HMPV RNA SPEC QL NAA+PROBE: DETECTED
HPIV1 RNA SPEC QL NAA+PROBE: NOT DETECTED
HPIV2 RNA SPEC QL NAA+PROBE: NOT DETECTED
HPIV3 RNA SPEC QL NAA+PROBE: NOT DETECTED
HPIV4 RNA SPEC QL NAA+PROBE: NOT DETECTED
LV EF: 53 %
LVEF MODALITY: ABNORMAL
M PNEUMO DNA SPEC QL NAA+PROBE: NOT DETECTED
MAGNESIUM SERPL-MCNC: 1.8 MG/DL (ref 1.8–2.4)
RSV RNA SPEC QL NAA+PROBE: NOT DETECTED
RV+EV RNA SPEC QL NAA+PROBE: NOT DETECTED
SARS-COV-2 RNA RESP QL NAA+PROBE: NOT DETECTED

## 2023-02-27 PROCEDURE — 2580000003 HC RX 258: Performed by: FAMILY MEDICINE

## 2023-02-27 PROCEDURE — 2700000000 HC OXYGEN THERAPY PER DAY

## 2023-02-27 PROCEDURE — 2500000003 HC RX 250 WO HCPCS: Performed by: STUDENT IN AN ORGANIZED HEALTH CARE EDUCATION/TRAINING PROGRAM

## 2023-02-27 PROCEDURE — 6360000004 HC RX CONTRAST MEDICATION: Performed by: FAMILY MEDICINE

## 2023-02-27 PROCEDURE — 2500000003 HC RX 250 WO HCPCS

## 2023-02-27 PROCEDURE — 2580000003 HC RX 258: Performed by: STUDENT IN AN ORGANIZED HEALTH CARE EDUCATION/TRAINING PROGRAM

## 2023-02-27 PROCEDURE — 71045 X-RAY EXAM CHEST 1 VIEW: CPT

## 2023-02-27 PROCEDURE — C8929 TTE W OR WO FOL WCON,DOPPLER: HCPCS

## 2023-02-27 PROCEDURE — 2000000000 HC ICU R&B

## 2023-02-27 PROCEDURE — 99222 1ST HOSP IP/OBS MODERATE 55: CPT | Performed by: INTERNAL MEDICINE

## 2023-02-27 PROCEDURE — 6370000000 HC RX 637 (ALT 250 FOR IP): Performed by: STUDENT IN AN ORGANIZED HEALTH CARE EDUCATION/TRAINING PROGRAM

## 2023-02-27 PROCEDURE — 94761 N-INVAS EAR/PLS OXIMETRY MLT: CPT

## 2023-02-27 PROCEDURE — 6360000002 HC RX W HCPCS

## 2023-02-27 PROCEDURE — 94760 N-INVAS EAR/PLS OXIMETRY 1: CPT

## 2023-02-27 PROCEDURE — 94640 AIRWAY INHALATION TREATMENT: CPT

## 2023-02-27 PROCEDURE — 6370000000 HC RX 637 (ALT 250 FOR IP): Performed by: FAMILY MEDICINE

## 2023-02-27 PROCEDURE — 6360000002 HC RX W HCPCS: Performed by: FAMILY MEDICINE

## 2023-02-27 PROCEDURE — 6360000002 HC RX W HCPCS: Performed by: STUDENT IN AN ORGANIZED HEALTH CARE EDUCATION/TRAINING PROGRAM

## 2023-02-27 PROCEDURE — 93306 TTE W/DOPPLER COMPLETE: CPT | Performed by: INTERNAL MEDICINE

## 2023-02-27 PROCEDURE — 0202U NFCT DS 22 TRGT SARS-COV-2: CPT

## 2023-02-27 RX ORDER — POTASSIUM CHLORIDE 7.45 MG/ML
10 INJECTION INTRAVENOUS PRN
Status: DISCONTINUED | OUTPATIENT
Start: 2023-02-27 | End: 2023-03-02 | Stop reason: HOSPADM

## 2023-02-27 RX ORDER — ADENOSINE 3 MG/ML
INJECTION, SOLUTION INTRAVENOUS
Status: COMPLETED
Start: 2023-02-27 | End: 2023-02-27

## 2023-02-27 RX ORDER — SODIUM CHLORIDE 0.9 % (FLUSH) 0.9 %
5-40 SYRINGE (ML) INJECTION EVERY 12 HOURS SCHEDULED
Status: DISCONTINUED | OUTPATIENT
Start: 2023-02-27 | End: 2023-03-02 | Stop reason: HOSPADM

## 2023-02-27 RX ORDER — LISINOPRIL 5 MG/1
10 TABLET ORAL DAILY
Status: DISCONTINUED | OUTPATIENT
Start: 2023-02-27 | End: 2023-03-02 | Stop reason: HOSPADM

## 2023-02-27 RX ORDER — MAGNESIUM SULFATE IN WATER 40 MG/ML
2000 INJECTION, SOLUTION INTRAVENOUS PRN
Status: DISCONTINUED | OUTPATIENT
Start: 2023-02-27 | End: 2023-03-02 | Stop reason: HOSPADM

## 2023-02-27 RX ORDER — 0.9 % SODIUM CHLORIDE 0.9 %
1000 INTRAVENOUS SOLUTION INTRAVENOUS
Status: COMPLETED | OUTPATIENT
Start: 2023-02-27 | End: 2023-02-27

## 2023-02-27 RX ORDER — POLYETHYLENE GLYCOL 3350 17 G/17G
17 POWDER, FOR SOLUTION ORAL DAILY PRN
Status: DISCONTINUED | OUTPATIENT
Start: 2023-02-27 | End: 2023-03-02 | Stop reason: HOSPADM

## 2023-02-27 RX ORDER — LEVALBUTEROL INHALATION SOLUTION 0.63 MG/3ML
0.63 SOLUTION RESPIRATORY (INHALATION) EVERY 4 HOURS PRN
Status: DISCONTINUED | OUTPATIENT
Start: 2023-02-27 | End: 2023-03-02 | Stop reason: HOSPADM

## 2023-02-27 RX ORDER — ADENOSINE 3 MG/ML
INJECTION, SOLUTION INTRAVENOUS
Status: DISCONTINUED
Start: 2023-02-27 | End: 2023-02-27 | Stop reason: WASHOUT

## 2023-02-27 RX ORDER — SODIUM CHLORIDE 9 MG/ML
INJECTION, SOLUTION INTRAVENOUS CONTINUOUS
Status: DISCONTINUED | OUTPATIENT
Start: 2023-02-27 | End: 2023-02-28

## 2023-02-27 RX ORDER — METOPROLOL TARTRATE 5 MG/5ML
5 INJECTION INTRAVENOUS
Status: COMPLETED | OUTPATIENT
Start: 2023-02-27 | End: 2023-02-27

## 2023-02-27 RX ORDER — ADENOSINE 3 MG/ML
6 INJECTION, SOLUTION INTRAVENOUS
Status: COMPLETED | OUTPATIENT
Start: 2023-02-27 | End: 2023-02-27

## 2023-02-27 RX ORDER — POTASSIUM CHLORIDE 20 MEQ/1
40 TABLET, EXTENDED RELEASE ORAL PRN
Status: DISCONTINUED | OUTPATIENT
Start: 2023-02-27 | End: 2023-03-02 | Stop reason: HOSPADM

## 2023-02-27 RX ORDER — ALBUTEROL SULFATE 2.5 MG/3ML
2.5 SOLUTION RESPIRATORY (INHALATION) EVERY 4 HOURS
Status: DISCONTINUED | OUTPATIENT
Start: 2023-02-27 | End: 2023-03-01

## 2023-02-27 RX ORDER — SODIUM CHLORIDE 0.9 % (FLUSH) 0.9 %
5-40 SYRINGE (ML) INJECTION PRN
Status: DISCONTINUED | OUTPATIENT
Start: 2023-02-27 | End: 2023-03-02 | Stop reason: HOSPADM

## 2023-02-27 RX ORDER — PREDNISONE 20 MG/1
40 TABLET ORAL ONCE
Status: COMPLETED | OUTPATIENT
Start: 2023-02-27 | End: 2023-02-27

## 2023-02-27 RX ORDER — METHYLPREDNISOLONE SODIUM SUCCINATE 40 MG/ML
40 INJECTION, POWDER, LYOPHILIZED, FOR SOLUTION INTRAMUSCULAR; INTRAVENOUS DAILY
Status: DISCONTINUED | OUTPATIENT
Start: 2023-02-27 | End: 2023-03-01

## 2023-02-27 RX ORDER — 0.9 % SODIUM CHLORIDE 0.9 %
1000 INTRAVENOUS SOLUTION INTRAVENOUS ONCE
Status: COMPLETED | OUTPATIENT
Start: 2023-02-27 | End: 2023-02-27

## 2023-02-27 RX ORDER — ONDANSETRON 4 MG/1
4 TABLET, ORALLY DISINTEGRATING ORAL EVERY 8 HOURS PRN
Status: DISCONTINUED | OUTPATIENT
Start: 2023-02-27 | End: 2023-03-02 | Stop reason: HOSPADM

## 2023-02-27 RX ORDER — METOPROLOL TARTRATE 5 MG/5ML
INJECTION INTRAVENOUS
Status: COMPLETED
Start: 2023-02-27 | End: 2023-02-27

## 2023-02-27 RX ORDER — ACETAMINOPHEN 325 MG/1
650 TABLET ORAL EVERY 6 HOURS PRN
Status: DISCONTINUED | OUTPATIENT
Start: 2023-02-27 | End: 2023-03-02 | Stop reason: HOSPADM

## 2023-02-27 RX ORDER — 0.9 % SODIUM CHLORIDE 0.9 %
500 INTRAVENOUS SOLUTION INTRAVENOUS ONCE
Status: COMPLETED | OUTPATIENT
Start: 2023-02-27 | End: 2023-02-27

## 2023-02-27 RX ORDER — ONDANSETRON 2 MG/ML
4 INJECTION INTRAMUSCULAR; INTRAVENOUS EVERY 6 HOURS PRN
Status: DISCONTINUED | OUTPATIENT
Start: 2023-02-27 | End: 2023-03-02 | Stop reason: HOSPADM

## 2023-02-27 RX ORDER — ACETAMINOPHEN 650 MG/1
650 SUPPOSITORY RECTAL EVERY 6 HOURS PRN
Status: DISCONTINUED | OUTPATIENT
Start: 2023-02-27 | End: 2023-03-02 | Stop reason: HOSPADM

## 2023-02-27 RX ORDER — ENOXAPARIN SODIUM 100 MG/ML
30 INJECTION SUBCUTANEOUS EVERY 12 HOURS
Status: DISCONTINUED | OUTPATIENT
Start: 2023-02-27 | End: 2023-02-28

## 2023-02-27 RX ORDER — DILTIAZEM HYDROCHLORIDE 60 MG/1
60 TABLET, FILM COATED ORAL EVERY 6 HOURS SCHEDULED
Status: DISCONTINUED | OUTPATIENT
Start: 2023-02-27 | End: 2023-02-28

## 2023-02-27 RX ORDER — ADENOSINE 3 MG/ML
12 INJECTION, SOLUTION INTRAVENOUS ONCE
Status: DISCONTINUED | OUTPATIENT
Start: 2023-02-27 | End: 2023-02-27

## 2023-02-27 RX ADMIN — SODIUM CHLORIDE, PRESERVATIVE FREE 10 ML: 5 INJECTION INTRAVENOUS at 20:41

## 2023-02-27 RX ADMIN — METOPROLOL TARTRATE 5 MG: 5 INJECTION INTRAVENOUS at 03:02

## 2023-02-27 RX ADMIN — ALBUTEROL SULFATE 2.5 MG: 2.5 SOLUTION RESPIRATORY (INHALATION) at 19:52

## 2023-02-27 RX ADMIN — DILTIAZEM HYDROCHLORIDE 60 MG: 60 TABLET, FILM COATED ORAL at 11:54

## 2023-02-27 RX ADMIN — ADENOSINE 6 MG: 3 INJECTION INTRAVENOUS at 02:52

## 2023-02-27 RX ADMIN — ALBUTEROL SULFATE 2.5 MG: 2.5 SOLUTION RESPIRATORY (INHALATION) at 12:13

## 2023-02-27 RX ADMIN — ALBUTEROL SULFATE 2.5 MG: 2.5 SOLUTION RESPIRATORY (INHALATION) at 08:47

## 2023-02-27 RX ADMIN — ADENOSINE 6 MG: 3 INJECTION INTRAVENOUS at 02:49

## 2023-02-27 RX ADMIN — SODIUM CHLORIDE 1000 ML: 9 INJECTION, SOLUTION INTRAVENOUS at 02:47

## 2023-02-27 RX ADMIN — SODIUM CHLORIDE: 9 INJECTION, SOLUTION INTRAVENOUS at 12:29

## 2023-02-27 RX ADMIN — ENOXAPARIN SODIUM 30 MG: 100 INJECTION SUBCUTANEOUS at 20:40

## 2023-02-27 RX ADMIN — SODIUM CHLORIDE, PRESERVATIVE FREE 10 ML: 5 INJECTION INTRAVENOUS at 08:27

## 2023-02-27 RX ADMIN — SODIUM CHLORIDE: 9 INJECTION, SOLUTION INTRAVENOUS at 08:26

## 2023-02-27 RX ADMIN — DILTIAZEM HYDROCHLORIDE 60 MG: 60 TABLET, FILM COATED ORAL at 08:25

## 2023-02-27 RX ADMIN — ENOXAPARIN SODIUM 30 MG: 100 INJECTION SUBCUTANEOUS at 08:27

## 2023-02-27 RX ADMIN — METOPROLOL TARTRATE 5 MG: 5 INJECTION INTRAVENOUS at 03:15

## 2023-02-27 RX ADMIN — ALBUTEROL SULFATE 2.5 MG: 2.5 SOLUTION RESPIRATORY (INHALATION) at 15:56

## 2023-02-27 RX ADMIN — SODIUM CHLORIDE 1000 ML: 9 INJECTION, SOLUTION INTRAVENOUS at 03:18

## 2023-02-27 RX ADMIN — IPRATROPIUM BROMIDE AND ALBUTEROL SULFATE 1 AMPULE: 2.5; .5 SOLUTION RESPIRATORY (INHALATION) at 02:15

## 2023-02-27 RX ADMIN — SODIUM CHLORIDE 500 ML: 9 INJECTION, SOLUTION INTRAVENOUS at 06:23

## 2023-02-27 RX ADMIN — SODIUM CHLORIDE 5 MG/HR: 900 INJECTION, SOLUTION INTRAVENOUS at 03:32

## 2023-02-27 RX ADMIN — METHYLPREDNISOLONE SODIUM SUCCINATE 40 MG: 40 INJECTION, POWDER, FOR SOLUTION INTRAMUSCULAR; INTRAVENOUS at 08:28

## 2023-02-27 RX ADMIN — PREDNISONE 40 MG: 20 TABLET ORAL at 00:34

## 2023-02-27 RX ADMIN — SODIUM CHLORIDE, PRESERVATIVE FREE 0.45 ML: 5 INJECTION INTRAVENOUS at 10:44

## 2023-02-27 RX ADMIN — DILTIAZEM HYDROCHLORIDE 60 MG: 60 TABLET, FILM COATED ORAL at 17:05

## 2023-02-27 ASSESSMENT — ENCOUNTER SYMPTOMS
COUGH: 1
ORTHOPNEA: 0
ALLERGIC/IMMUNOLOGIC NEGATIVE: 1
COLOR CHANGE: 0
EYES NEGATIVE: 1
SHORTNESS OF BREATH: 1
SPUTUM PRODUCTION: 1
GASTROINTESTINAL NEGATIVE: 1

## 2023-02-27 ASSESSMENT — PAIN SCALES - GENERAL
PAINLEVEL_OUTOF10: 0

## 2023-02-27 NOTE — ED NOTES
Handoff report received from [facility], [facility]. This RN to assume care at this time.       Dulce Maria Fairchild RN  02/27/23 [id number]

## 2023-02-27 NOTE — PLAN OF CARE
Problem: Discharge Planning  Goal: Discharge to home or other facility with appropriate resources  Outcome: Progressing  Flowsheets (Taken 2/27/2023 0710)  Discharge to home or other facility with appropriate resources:   Identify barriers to discharge with patient and caregiver   Arrange for needed discharge resources and transportation as appropriate   Identify discharge learning needs (meds, wound care, etc)   Refer to discharge planning if patient needs post-hospital services based on physician order or complex needs related to functional status, cognitive ability or social support system     Problem: Pain  Goal: Verbalizes/displays adequate comfort level or baseline comfort level  Outcome: Progressing  Flowsheets (Taken 2/27/2023 0730)  Verbalizes/displays adequate comfort level or baseline comfort level:   Encourage patient to monitor pain and request assistance   Assess pain using appropriate pain scale   Administer analgesics based on type and severity of pain and evaluate response   Implement non-pharmacological measures as appropriate and evaluate response   Notify Licensed Independent Practitioner if interventions unsuccessful or patient reports new pain

## 2023-02-27 NOTE — ED TRIAGE NOTES
Pt states he started having SOB Friday, was seen at urgent care yesterday where they dx him with bacterial bronchitis. Negative for flu and for covid, pt c/o sob getting worse, as well as a horrible cough and chest congestion.

## 2023-02-27 NOTE — H&P
[facility] CARDIOLOGY History &Physical                   Subjective:     Date of  Admission: 2/26/2023  8:54 PM     Primary Care Physician: Hudson Laureano MD  Primary Cardiologist: None  Referring Physician: Dr eDric Rodriguez Physician: Dr [facility]     CC/Reason for evaluation: atrial fibrillation with RVR    HPI:  Gudelia Hyde is a 67 y.o. male with prior history of HTN, asthma and former smoker who presented to [facility] ED on 2/26 with complaint of shortness of breath, cough and congestion. Seen day prior at Urgent Care and diagnosed with bronchitis and started on Zpack and tessalon pearls. Patient reported 3 days history of worsening shortness of breath. Shortness of breath worse with exertion as well as at rest. Admits to associated fever, wheezing, sputum production. No obvious aggravating factor for his presentation. Symptoms moderate to severe, have improved somewhat he reports since admission to the hospital.  Patient was admitted to the internal medicine team, found to be in atrial fibrillation with RVR, started on diltiazem and subsequently converted to sinus rhythm with PACs. Patient did report palpitations heart rate was elevated. No other alleviating or aggravating factors identified. No other associated symptoms.       Lab Results   Component Value Date     (L) 02/26/2023    K 4.1 02/26/2023    MG 1.8 02/26/2023    BUN 19 02/26/2023    WBC 11.0 02/26/2023    HGB 14.9 02/26/2023     02/26/2023    TSH 2.400 03/07/2022        Past Medical History:   Diagnosis Date    Allergy desensitization therapy     receives injections every other week    Backache, unspecified 10/24/2016    Cervicalgia 10/24/2016    H/O complete eye exam Fall 2019    Dr. Argyle Cheadle, [address]    Hypertension     Pain in joint, multiple sites 10/24/2016    Positive HOMERO (antinuclear antibody) 10/24/2016    Work-up done with Rheumatology, negative    Shingles 1990's    hospitalized for 6 days    Toe fracture, left 2019    5th metatarcel      Past Surgical History:   Procedure Laterality Date    COLONOSCOPY      clear. Dr. Everett Bodily. CYST REMOVAL  1969    HEENT  1954    tonsils as a child    [address] [facility]    knee arthroscopic    ORTHOPEDIC SURGERY  2018    right foot surgery    OTHER SURGICAL HISTORY      basal cell and squamous cell removed      Family History   Problem Relation Age of Onset    Heart Disease Paternal Grandfather     Asthma Paternal Grandfather     No Known Problems Brother     Hypertension Father     High Cholesterol Father     Prostate Cancer Father     Cancer Father 80        bladder & stomach    Osteoarthritis Mother     Heart Disease Father       Social History     Tobacco Use    Smoking status: Former     Types: Cigarettes     Quit date: 1980     Years since quittin.1    Smokeless tobacco: Never   Substance Use Topics    Alcohol use: Yes     Alcohol/week: 5.0 standard drinks      No Known Allergies      Review of Systems   Constitutional: Positive for chills and fever. Eyes: Negative. Cardiovascular:  Positive for irregular heartbeat and palpitations. Negative for chest pain, dyspnea on exertion, leg swelling, near-syncope, orthopnea, paroxysmal nocturnal dyspnea and syncope. Respiratory:  Positive for cough, shortness of breath and sputum production. Hematologic/Lymphatic: Negative. Negative for bleeding problem. Does not bruise/bleed easily. Skin: Negative. Negative for color change and rash. Musculoskeletal: Negative. Gastrointestinal: Negative. Genitourinary: Negative. Neurological: Negative. Psychiatric/Behavioral: Negative. Allergic/Immunologic: Negative. All other systems reviewed and are negative.       Objective:       /63   Pulse 67   Temp 97.3 °F (36.3 °C) (Axillary)   Resp 15   Ht 5' 10\" (1.778 m)   Wt 233 lb 12.8 oz (106.1 kg)   SpO2 96%   BMI 33.55 kg/m²      07 -  1900  In: -   Out: 600 [Urine:600]  No intake/output data recorded. Physical Exam  Vitals reviewed. Constitutional:       Appearance: He is not ill-appearing, toxic-appearing or diaphoretic. HENT:      Head: Normocephalic and atraumatic. Right Ear: External ear normal.      Left Ear: External ear normal.      Nose: Nose normal.   Eyes:      General: No scleral icterus. Right eye: No discharge. Left eye: No discharge. Cardiovascular:      Rate and Rhythm: Normal rate. Rhythm irregular. Heart sounds: Normal heart sounds. No murmur heard. No friction rub. No gallop. Pulmonary:      Effort: Pulmonary effort is normal. No respiratory distress. Breath sounds: No stridor. Wheezing present. No rales. Comments: Frequent coughing on examination. Abdominal:      General: Abdomen is flat. There is no distension. Palpations: Abdomen is soft. Tenderness: There is no abdominal tenderness. Musculoskeletal:         General: Normal range of motion. Right lower leg: No edema. Left lower leg: No edema. Skin:     General: Skin is warm and dry. Neurological:      Mental Status: He is alert and oriented to person, place, and time. Psychiatric:         Mood and Affect: Mood normal.         Thought Content: Thought content normal.         Judgment: Judgment normal.         Data Review:   Recent Labs     02/26/23 2013 02/26/23 2014   NA  --  129*   K  --  4.1   MG 1.8  --    BUN  --  19   WBC  --  11.0   HGB  --  14.9   HCT  --  43.9   PLT  --  150     Echocardiogram 2/27/2023:  Left Ventricle Low normal left ventricular systolic function with a visually estimated EF of 50 - 55%. Left ventricle size is normal. Normal wall thickness. Unable to assess wall motion. Normal diastolic function. Left Atrium Left atrium is mildly dilated. Right Ventricle Right ventricle size is normal. Normal systolic function.    Right Atrium Right atrium size is normal.   Aortic Valve Valve structure is normal. No regurgitation. No stenosis. Mitral Valve Valve structure is normal. Trace regurgitation. No stenosis noted. Tricuspid Valve Valve structure is normal. Trace regurgitation. No stenosis noted. Unable to assess RVSP due to inadequate or insignificant tricuspid regurgitation. Pulmonic Valve The pulmonic valve was not well visualized. No stenosis noted. Aorta Normal sized aortic root. IVC/Hepatic Veins IVC diameter is less than or equal to 21 mm and decreases greater than 50% during inspiration; therefore the estimated right atrial pressure is normal (~3 mmHg). IVC size is normal.   Pericardium No pericardial effusion. Assessment/Plan:       Principal Problem:    Acute respiratory failure with hypoxia (HCC)    Acute asthma exacerbation  -Per medicine service, continues to have cough, hypoxia, wheezing. Atrial fibrillation with rapid ventricular response (HCC)  -Available EKGs and telemetry reviewed. Patient with evidence of atrial fibrillation RVR, has subsequently converted to sinus rhythm with PACs. -[id number] equal to 2 (age greater than 72, HTN) warrants systemic anticoagulation with Eliquis 5 mg p.o. twice daily. Discussed indication with the patient today. He denies history of life-threatening intracranial GI or other bleeding.  -Start Eliquis once okay from primary team if no procedures are planned. - recommend checking TSH  -Convert diltiazem to XR formulation for ease of use.  -Echocardiogram as above with normal range LVEF  -Continue telemetry monitoring while inpatient, correct K and Mg to normal levels as possible. Essential hypertension  - Controlled at this time on lisinopril 10, diltiazem. Hyponatremia  -Per medicine service    Patient in sinus rhythm. We will be on standby. Please have the patient follow-up in cardiology office 2 to 4 weeks post discharge. Recommendations as above, please call with further questions.

## 2023-02-27 NOTE — H&P
[facility] Initial History and Physical Note    Patient: Antoine Dominguez Date: 2/27/2023  male, 67 y.o. Admit Date: 2/26/2023  Attending: Deb Baker MD     ASSESSMENT AND PLAN:     Principal Problem:    Acute respiratory failure with hypoxia ([address]  Plan: Secondary to asthma exacerbation. Wean O2 as tolerated. Active Problems:    Atrial fibrillation with RVR. Plan: New-onset. Cardizem gtt. TTE in AM. Consult cardiology    Acute asthma exacerbation  Plan: Albuterol neb q4h. IV solumedrol. Follow clinically    Hyponatremia  Plan: IV NS, follow BMP         DVT Prophylaxis: Lovenox      Code Status: FULL CODE      Disposition: Admit to ICU for evaluation and treatment as per above. Anticipated discharge: 3-4 days     CHIEF COMPLAINT:  shortness of breath    HISTORY OF PRESENT ILLNESS:      Patient Active Problem List   Diagnosis    Cervicalgia    Positive HOMERO (antinuclear antibody)    Pain in joint, multiple sites    Acute asthma exacerbation    Hyponatremia    Acute respiratory failure with hypoxia ([address]       Antoine Dominguez is a 67 y.o. male, with a history of  has a past medical history of Allergy desensitization therapy, Backache, unspecified, Cervicalgia, H/O complete eye exam, Hypertension, Pain in joint, multiple sites, Positive HOMERO (antinuclear antibody), Shingles, and Toe fracture, left. ,  has a past surgical history that includes heent (3201); orthopedic surgery (1980); other surgical history; orthopedic surgery (02/2018); cyst removal (1969); and Colonoscopy (2011). who presents to the ER with report of shortness of breath that has gotten progressively worsening for the past 3 days. Admits to dry cough and wheezing. Denies any fevers, chills, nausea, vomiting. Started on Z-Jorge after visiting urgent care yesterday but has not improved since then    Allergy  No Known Allergies    Medication list  Not in a hospital admission.      Past Medical History   Past Medical History: Diagnosis Date    Allergy desensitization therapy     receives injections every other week    Backache, unspecified 10/24/2016    Cervicalgia 10/24/2016    H/O complete eye exam Fall     Dr. Gemma Goodwin, [address]    Hypertension     Pain in joint, multiple sites 10/24/2016    Positive HOMERO (antinuclear antibody) 10/24/2016    Work-up done with Rheumatology, negative    Shingles 's    hospitalized for 6 days    Toe fracture, left 2019    5th metatarcel       Past Surgical History:   Procedure Laterality Date    COLONOSCOPY      clear. Dr. Sheng Decker. CYST REMOVAL      HEENT      tonsils as a child    ORTHOPEDIC SURGERY      knee arthroscopic    ORTHOPEDIC SURGERY  2018    right foot surgery    OTHER SURGICAL HISTORY      basal cell and squamous cell removed       Social History   Social History     Socioeconomic History    Marital status:      Spouse name: None    Number of children: None    Years of education: None    Highest education level: None   Tobacco Use    Smoking status: Former     Types: Cigarettes     Quit date: 1980     Years since quittin.1    Smokeless tobacco: Never   Substance and Sexual Activity    Alcohol use: Yes     Alcohol/week: 5.0 standard drinks    Drug use: Never       Family History:   Family History   Problem Relation Age of Onset    Heart Disease Paternal Grandfather     Asthma Paternal Grandfather     No Known Problems Brother     Hypertension Father     High Cholesterol Father     Prostate Cancer Father     Cancer Father 80        bladder & stomach    Osteoarthritis Mother     Heart Disease Father        REVIEW OF SYSTEMS:   A 14 point review of systems was taken and pertinent positive as per HPI.     PHYSICAL EXAMINATION:  Vital 24 Hour Range Most Recent Value  Temperature Temp  Min: 99 °F (37.2 °C)  Max: 99 °F (37.2 °C) 99 °F (37.2 °C)    Pulse Pulse  Min: 105  Max: 125 (!) 105  Respiratory Resp  Min: 16  Max: 18 18  Blood Pressure BP Min: 118/67  Max: 148/81 (!) 148/81  Pulse Oximetry SpO2  Min: 88 %  Max: 95 % 93 %  O2 O2 Flow Rate (L/min)  Av L/min  Min: 2 L/min   Min taken time: 23  Max: 2 L/min   Max taken time: 23      Vital Most Recent Value First Value  Weight 215 lb (97.5 kg) Weight: 215 lb (97.5 kg)  Height      BMI 0 N/A    Physical Exam:   General:     No acute distress, speaking in full sentences. Eyes:   No palpebral pallor or scleral icterus. ENT:   External auricular and nasal exam within normal limits. Cardiovascular: Tachycardia. No cyanosis or edema of extremities. Respiratory:    No respiratory distress or accessory muscle use. Gastrointestinal:   Not actively vomiting, abdomen non-distended   Skin:      Normal color. No rashes, lesions, or jaundice. Neurologic:  CN II-XII grossly intact and symmetrical.      Moving all four extremities well with no focal deficits. Psychiatric:  Appropriate affect.  Alert     Intake/Output last 3 shifts:  No intake or output data in the 24 hours ending 23     Labs:  Lab Results   Component Value Date     (L) 2023    K 4.1 2023    CL 98 (L) 2023    CO2 23 2023    BUN 19 2023    CREATININE 1.28 2023    GLUCOSE 150 (H) 2023    CALCIUM 8.3 2023    PROT 6.7 2023    LABALBU 3.5 2023    BILITOT 0.6 2023    ALKPHOS 54 2023    AST 28 2023    ALT 33 2023    LABGLOM 59 (L) 2023    GFRAA 83 2021    AGRATIO 2.6 (H) 2022    GLOB 3.2 2023      No results found for: MG  Lab Results   Component Value Date    CALCIUM 8.3 2023        Lab Results   Component Value Date    WBC 11.0 2023    HGB 14.9 2023    HCT 43.9 2023    MCV 91.6 2023     2023        No results found for: INR, PROTIME     No results found for: CKTOTAL, CKMB, CKMBINDEX, TROPONINI     Imagining & Other Studies  CTA CHEST W WO CONTRAST PE Eval  Narrative: CT ANGIOGRAM OF THE CHEST    INDICATION: Hypoxia, cough    TECHNIQUE: CT scan of the chest was performed prior to and following the   administration of 100 mL Isovue-370 intravenous contrast. Imaging was performed   to optimize evaluation of the pulmonary arterial system. CT dose lowering   techniques were used, to include: automated exposure control, adjustment for   patient size, and / or use of iterative reconstruction. COMPARISON: None    FINDINGS:  Vasculature: There is borderline aneurysmal dilatation of the ascending aorta   measuring 3.9 cm in diameter. There is no evidence of aortic dissection or acute   aortic syndrome. No central, lobar or proximal segmental pulmonary arterial   filling defect is identified. The distal segmental vasculature is poorly   assessed due to artifact from respiratory motion. Mediastinum / Pleura: There is no pericardial or pleural effusion. An enlarged   right hilar lymph node measures 1.4 cm in short axis dimension. Airways / Lungs: The central airways are patent. There are some impacted distal   bronchi in the right lower lobe. There is no pneumothorax or consolidative   parenchymal disease. Bones and Chest Wall: No destructive osseous lesion is identified. Upper Abdomen: Limited images below the diaphragm demonstrate no acute   abnormality. Impression: 1. No evidence of aortic dissection or acute aortic syndrome. 2. No evidence of pulmonary embolism, accounting for mild limitation due to   respiratory motion. 3. Borderline ascending aortic aneurysm measuring 3.9 cm in diameter. 4. Impacted distal bronchi in the right lower lobe. This may represent uncleared   secretions or evidence of aspiration. 5. Right hilar lymphadenopathy, nonspecific. Neoplastic involvement cannot be   entirely excluded.      Christina Reynolds M.D.   2/26/2023 11:55:00 PM  XR CHEST PORTABLE  Narrative: XR CHEST PORTABLE  2/26/2023 8:16 PM    History: Shortness of breath; Sepsis    Comparison: None. Technique: Routine. Findings:   Tiny linear streaky density left lung base consistent with scar or atelectasis. Lung parenchyma otherwise clear. Cardiac silhouette unremarkable. Impression: Impression:   1. Linear streaky density left lung base consistent with scar or atelectasis. No   finding to account for shortness of breath. Slot # 62    Thank you for the referral of this patient. This exam was interpreted by an   American Board of Radiology certified Diversified radiologist with subspecialty   fellowship in [place] [address]. If there are any questions regarding this exam   please feel free to contact a body radiologist directly at [phone].  Or you   can reach me personally at [email or web address].          Melva Donohue M.D., [facility]   2/26/2023 8:50:00 PM       Encounter Date: 02/26/23   EKG 12 Lead   Result Value    Ventricular Rate 116    Atrial Rate 116    P-R Interval 126    QRS Duration 86    Q-T Interval 304    QTc Calculation (Bazett) 422    P Axis 41    R Axis 4    T Axis 72    Diagnosis      Sinus tachycardia with Premature supraventricular complexes        Marlene Addison MD  2/27/2023 2:19 AM

## 2023-02-27 NOTE — ED NOTES
Attempted to give report at this time, RN to call back and receive report.       Mandie Shepard RN  02/27/23 9468

## 2023-02-27 NOTE — INTERDISCIPLINARY ROUNDS
Interdisciplinary team rounds were held 2/27/2023 with the following team members:Care Management, Nursing, Pastoral Care, Physical Therapy, Physician, and Clinical Coordinator and the patient. Plan of care discussed. See clinical pathway and/or care plan for interventions and desired outcomes.

## 2023-02-27 NOTE — PROGRESS NOTES
This is a non-billable note. Patient admitted by my partner this morning. On HFNC 10. Later 4L. Rate controlled. Add oral diltiazem. Wean diltiazem gtt. Add levalbuterol. CXR with bibasilar atelectasis, add IS. Otherwise assessment and plan per H&P. Seen multiple times for gtt titration and O2 mgt. Patient is critically ill. Without intervention, there is a high probability of acute organ impairment or life-threatening deterioration. Critical care interventions: see plan of care  Total critical care time spent: 37 minutes.

## 2023-02-27 NOTE — ED NOTES
Gave report to Jose Antonio Tran [place] [address], [facility], RN  02/27/23 4053

## 2023-02-27 NOTE — CARE COORDINATION
02/27/23 1610   Service Assessment   Patient Orientation Alert and Oriented   Cognition Alert   History Provided By Patient;Spouse   Primary Caregiver Self   Support Systems Spouse/Significant Other   PCP Verified by CM No   Prior Functional Level Independent in ADLs/IADLs   Current Functional Level Independent in ADLs/IADLs   Can patient return to prior living arrangement Yes   Ability to make needs known: Good   Family able to assist with home care needs: Yes   Would you like for me to discuss the discharge plan with any other family members/significant others, and if so, who? No   Financial Resources Medicare   Community Resources None   Social/Functional History   Lives With Spouse   Type of [address]   (nebulizer)   ADL Assistance Independent   Mode of Transportation Car   Discharge Planning   Type of Residence House   Living Arrangements Spouse/Significant Other   Current Services Prior To Admission Durable Medical Equipment   Type of [address] None   Patient expects to be discharged to: [address] [address] Discharge   Transition of [address] (CM Consult) N/A     RN CHAVO met with the patient and his wife Gloria Gonzales at the bedside and discussed discharge planning. He is independent of ADLs at baseline and does not use external services. He owns a nebulizer. He confirmed he has health insurance and a PCP. He is currently requiring oxygen but does not use it at baseline. RN CHAVO encouraged them to ask questions. Case Management will continue to follow him for discharge planning needs.

## 2023-02-27 NOTE — CARE COORDINATION
Discharge planning was discussed with the Critical Care Interdisciplinary Team. He is receiving IV treatments and oxygen.  Discharge planning is pending the patient's clinical course in the hospital.

## 2023-02-27 NOTE — ED NOTES
TRANSFER - OUT REPORT:    Verbal report given to Dhara Blanca RN on Rylee Noriega  being transferred to  for routine progression of patient care       Report consisted of patient's Situation, Background, Assessment and   Recommendations(SBAR). Information from the following report(s) Nurse Handoff Report was reviewed with the receiving nurse. [place] Assessment: No data recorded  Lines:   Peripheral IV 02/26/23 Distal;Right Cephalic (Active)       Peripheral IV 02/27/23 Right Hand (Active)        Opportunity for questions and clarification was provided.       Patient transported with:  Monitor   O2   Pt eliseo Akers [facility]  02/27/23 [id number]

## 2023-02-27 NOTE — ED NOTES
In the time it took to change out the pts O2 tank, he desatted to 88%     Elli Stanford RN  02/27/23 3958

## 2023-02-27 NOTE — ED PROVIDER NOTES
[address]  Emergency Department    ED Course     ED Course as of 02/27/23 8052   Radha Morrison Feb 26, 2023   257 68-year-old male with a history of asthma presents with 3 days shortness of breath progressively worsening cough. O2 88% on room air, tachycardic otherwise vitals within normal limits. Does have diffuse rhonchi with some mild wheeze on exam.  Will obtain blood work, blood cultures and CTA to rule out PE. Patient was initially evaluated by triage staff 2 hours prior to my arrival to the hospital. [ER]   Mon Feb 27, 2023   0040 Blood work mild hyponatremia and slightly elevated procalcitonin otherwise within normal limits. He does feel somewhat improved after DuoNeb treatments. While changing out oxygen tanks he did desat back down to 88% on room air and felt short of breath. CTA imaging negative for PE and no clear pneumonia. Admitted to hospitalist service [ER]   9064 While awaiting floor bed upstairs, patient went into narrow complex tachycardia with a rate in the 180s. Given adenosine 6 mg x 2 with no improvement. Rate did slow down after meoprolol 5 mg IV and rhythm appears more consistent with A-fib with RVR. He has no history of this. Will start on Cardizem drip. Hospitalist aware and will escalate care to ICU [ER]      ED Course User Index  [ER] Zuly Preciado MD     DIANA Perez is a 67 y.o. male with a history of asthma who presents to the ED with complaint of distress of breath. Patient was a 3-day history progressive worsening shortness of breath associated with cough. He has had a mild nonproductive cough over the past few days and has become worse yesterday. Urgent care yesterday and told he had bronchitis and was started on a Z-Jorge and Tessalon Perles. Reports no improvement today. Does endorse some shortness of breath is worse with ambulation. Denies chest pain, abdominal pain, nausea, vomiting, diaphoresis.   Does report some subjective fevers at home. He has been using his home inhaler with no improvement    ROS   Review of Systems   Constitutional:  Positive for fever. Respiratory:  Positive for cough and shortness of breath. All other systems reviewed and are negative. History     Past Medical History:   Diagnosis Date    Allergy desensitization therapy     receives injections every other week    Backache, unspecified 10/24/2016    Cervicalgia 10/24/2016    H/O complete eye exam Fall 2019    Dr. Sowmya Roman, [address]    Hypertension     Pain in joint, multiple sites 10/24/2016    Positive HOMERO (antinuclear antibody) 10/24/2016    Work-up done with Rheumatology, negative    Shingles 1990's    hospitalized for 6 days    Toe fracture, left 2019    5th metatarcel     Past Surgical History:   Procedure Laterality Date    COLONOSCOPY  2011    clear. Dr. Aneudy Hernandez. CYST REMOVAL  1969    HEENT  1954    tonsils as a child    ORTHOPEDIC SURGERY  1980    knee arthroscopic    ORTHOPEDIC SURGERY  02/2018    right foot surgery    OTHER SURGICAL HISTORY      basal cell and squamous cell removed     Family History   Problem Relation Age of Onset    Heart Disease Paternal Grandfather     Asthma Paternal Grandfather     No Known Problems Brother     Hypertension Father     High Cholesterol Father     Prostate Cancer Father     Cancer Father 80        bladder & stomach    Osteoarthritis Mother     Heart Disease Father      No Known Allergies    Physical Exam     Vitals:    02/26/23 2006 02/26/23 2056 02/26/23 2116 02/26/23 2126   BP:  (!) 148/88 (!) 145/78 139/81   Pulse:       Resp:       Temp:       TempSrc:       SpO2:  93% 94% 93%   Weight: 215 lb (97.5 kg)        Nursing note and vitals reviewed. Constitutional: Well developed, NAD  HEENT: Atraumatic, conjugate gaze, EOM intact  Neck: Supple  Cardiovascular: No cyanosis, diaphoresis, or JVD appreciated. Normal S1/S2 with no murmurs noted. Respiratory: Effort normal. No respiratory distress. Lungs with diffuse rhonchi with mild expiratory wheeze  Gastrointestinal: Bowel sounds present. Non-distended. MSK: No deformities appreciated. No peripheral edema. Skin: Skin is warm and dry. No rash appreciated. Neuro: Alert and oriented, moves all four extremities. Psych: Pleasant and cooperative. Procedures   Procedures    MDM     Labs Reviewed   CBC WITH AUTO DIFFERENTIAL - Abnormal; Notable for the following components:       Result Value    Lymphocytes 8 (*)     Monocytes 13 (*)     Eosinophils % 0 (*)     Segs Absolute 8.6 (*)     Absolute Mono # 1.5 (*)     All other components within normal limits   COMPREHENSIVE METABOLIC PANEL - Abnormal; Notable for the following components:    Sodium 129 (*)     Chloride 98 (*)     Glucose 150 (*)     Est, Glom Filt Rate 59 (*)     All other components within normal limits   PROCALCITONIN - Abnormal; Notable for the following components:    Procalcitonin 0.88 (*)     All other components within normal limits   CULTURE, BLOOD 1   CULTURE, BLOOD 2   LACTATE, SEPSIS   LACTATE, SEPSIS   BRAIN NATRIURETIC PEPTIDE     Medications   0.9 % sodium chloride bolus (has no administration in time range)   azithromycin (ZITHROMAX) 500 mg in sodium chloride 0.9 % 250 mL IVPB ([id number]) (has no administration in time range)   cefTRIAXone (ROCEPHIN) 1,000 mg in sodium chloride 0.9 % 50 mL IVPB (mini-bag) (has no administration in time range)   ipratropium-albuterol (DUONEB) nebulizer solution 1 ampule (has no administration in time range)     XR CHEST PORTABLE   Final Result   Impression:    1. Linear streaky density left lung base consistent with scar or atelectasis. No    finding to account for shortness of breath. Slot # 62      Thank you for the referral of this patient. This exam was interpreted by an    American Board of Radiology certified Diversified radiologist with subspecialty    fellowship in [place] [address].   If there are any questions regarding this exam please feel free to contact a body radiologist directly at [phone]. Or you    can reach me personally at [email or web address].              Yonny Layton M.D., [facility]    2/26/2023 8:50:00 PM      CTA CHEST W WO CONTRAST PE Eval    (Results Pending)     Medical Decision Making  Amount and/or Complexity of Data Reviewed  Labs: ordered. Radiology: ordered. ECG/medicine tests: ordered. Risk  Prescription drug management. Decision regarding hospitalization. Complexity of Problem:1 acute or chronic illness that poses a threat to life or bodily function. (5)  The patients assessment required an independent historian: I spoke with a family member. I have conducted an independent ordering and review of Labs. I have conducted an independent ordering and review of EKG. I have conducted an independent ordering and review of X-rays. I have conducted an independent ordering and review of CT Scan. Considerations: Hospitalization was considered. I discussed disposition with another provider. Patient was admitted I have communication with admitting physician. Critical care time: 37 minutes of critical care time was performed in the emergency department. This was separate from any other procedures listed during the patients emergency department course. The failure to initiate these interventions on an urgent basis would likely have resulted in sudden, clinically significant or life-threatening deterioration in the patients condition. Voice dictation software was used during the making of this note. This software is not perfect and grammatical and other typographical errors may be present. This note has not been completely proofread for errors.      Alfreda Sandoval MD  02/27/23 4628

## 2023-02-27 NOTE — ED NOTES
EKGs captured after administration of medications; given to provider at bedside. Pt denies chest pain.      Louann Shields RN  02/27/23 Laury Jett RN  02/27/23 0529

## 2023-02-28 PROBLEM — E66.09 CLASS 1 OBESITY DUE TO EXCESS CALORIES WITH SERIOUS COMORBIDITY AND BODY MASS INDEX (BMI) OF 33.0 TO 33.9 IN ADULT: Status: ACTIVE | Noted: 2023-02-28

## 2023-02-28 PROBLEM — I48.0 HYPERCOAGULABLE STATE DUE TO PAROXYSMAL ATRIAL FIBRILLATION (HCC): Status: ACTIVE | Noted: 2023-02-28

## 2023-02-28 PROBLEM — D68.69 HYPERCOAGULABLE STATE DUE TO PAROXYSMAL ATRIAL FIBRILLATION (HCC): Status: ACTIVE | Noted: 2023-02-28

## 2023-02-28 PROBLEM — E66.811 CLASS 1 OBESITY DUE TO EXCESS CALORIES WITH SERIOUS COMORBIDITY AND BODY MASS INDEX (BMI) OF 33.0 TO 33.9 IN ADULT: Status: ACTIVE | Noted: 2023-02-28

## 2023-02-28 LAB
ALBUMIN SERPL-MCNC: 2.6 G/DL (ref 3.2–4.6)
ALBUMIN/GLOB SERPL: 0.8 (ref 0.4–1.6)
ALP SERPL-CCNC: 44 U/L (ref 50–136)
ALT SERPL-CCNC: 39 U/L (ref 12–65)
ANION GAP SERPL CALC-SCNC: 8 MMOL/L (ref 2–11)
AST SERPL-CCNC: 38 U/L (ref 15–37)
BASOPHILS # BLD: 0 K/UL (ref 0–0.2)
BASOPHILS NFR BLD: 0 % (ref 0–2)
BILIRUB SERPL-MCNC: 0.4 MG/DL (ref 0.2–1.1)
BUN SERPL-MCNC: 19 MG/DL (ref 8–23)
CALCIUM SERPL-MCNC: 7.6 MG/DL (ref 8.3–10.4)
CHLORIDE SERPL-SCNC: 108 MMOL/L (ref 101–110)
CO2 SERPL-SCNC: 20 MMOL/L (ref 21–32)
CREAT SERPL-MCNC: 0.98 MG/DL (ref 0.8–1.5)
DIFFERENTIAL METHOD BLD: ABNORMAL
EOSINOPHIL # BLD: 0 K/UL (ref 0–0.8)
EOSINOPHIL NFR BLD: 0 % (ref 0.5–7.8)
ERYTHROCYTE [DISTWIDTH] IN BLOOD BY AUTOMATED COUNT: 13.1 % (ref 11.9–14.6)
GLOBULIN SER CALC-MCNC: 3.2 G/DL (ref 2.8–4.5)
GLUCOSE SERPL-MCNC: 146 MG/DL (ref 65–100)
HCT VFR BLD AUTO: 40 % (ref 41.1–50.3)
HGB BLD-MCNC: 13.3 G/DL (ref 13.6–17.2)
IMM GRANULOCYTES # BLD AUTO: 0 K/UL (ref 0–0.5)
IMM GRANULOCYTES NFR BLD AUTO: 0 % (ref 0–5)
LYMPHOCYTES # BLD: 0.8 K/UL (ref 0.5–4.6)
LYMPHOCYTES NFR BLD: 7 % (ref 13–44)
MCH RBC QN AUTO: 30.6 PG (ref 26.1–32.9)
MCHC RBC AUTO-ENTMCNC: 33.3 G/DL (ref 31.4–35)
MCV RBC AUTO: 92.2 FL (ref 82–102)
MONOCYTES # BLD: 1 K/UL (ref 0.1–1.3)
MONOCYTES NFR BLD: 9 % (ref 4–12)
NEUTS SEG # BLD: 9.3 K/UL (ref 1.7–8.2)
NEUTS SEG NFR BLD: 83 % (ref 43–78)
NRBC # BLD: 0 K/UL (ref 0–0.2)
PLATELET # BLD AUTO: 137 K/UL (ref 150–450)
PMV BLD AUTO: 10.9 FL (ref 9.4–12.3)
POTASSIUM SERPL-SCNC: 3.8 MMOL/L (ref 3.5–5.1)
PROT SERPL-MCNC: 5.8 G/DL (ref 6.3–8.2)
RBC # BLD AUTO: 4.34 M/UL (ref 4.23–5.6)
SODIUM SERPL-SCNC: 136 MMOL/L (ref 133–143)
WBC # BLD AUTO: 11.2 K/UL (ref 4.3–11.1)

## 2023-02-28 PROCEDURE — 6360000002 HC RX W HCPCS: Performed by: FAMILY MEDICINE

## 2023-02-28 PROCEDURE — 97161 PT EVAL LOW COMPLEX 20 MIN: CPT

## 2023-02-28 PROCEDURE — 2000000000 HC ICU R&B

## 2023-02-28 PROCEDURE — 94761 N-INVAS EAR/PLS OXIMETRY MLT: CPT

## 2023-02-28 PROCEDURE — 94760 N-INVAS EAR/PLS OXIMETRY 1: CPT

## 2023-02-28 PROCEDURE — 6370000000 HC RX 637 (ALT 250 FOR IP): Performed by: FAMILY MEDICINE

## 2023-02-28 PROCEDURE — 94640 AIRWAY INHALATION TREATMENT: CPT

## 2023-02-28 PROCEDURE — 97165 OT EVAL LOW COMPLEX 30 MIN: CPT

## 2023-02-28 PROCEDURE — 85025 COMPLETE CBC W/AUTO DIFF WBC: CPT

## 2023-02-28 PROCEDURE — 36415 COLL VENOUS BLD VENIPUNCTURE: CPT

## 2023-02-28 PROCEDURE — 2700000000 HC OXYGEN THERAPY PER DAY

## 2023-02-28 PROCEDURE — 80053 COMPREHEN METABOLIC PANEL: CPT

## 2023-02-28 PROCEDURE — 97530 THERAPEUTIC ACTIVITIES: CPT

## 2023-02-28 PROCEDURE — 2580000003 HC RX 258: Performed by: FAMILY MEDICINE

## 2023-02-28 RX ORDER — DILTIAZEM HYDROCHLORIDE 120 MG/1
240 CAPSULE, COATED, EXTENDED RELEASE ORAL DAILY
Status: DISCONTINUED | OUTPATIENT
Start: 2023-02-28 | End: 2023-03-02 | Stop reason: HOSPADM

## 2023-02-28 RX ORDER — BENZONATATE 100 MG/1
200 CAPSULE ORAL 3 TIMES DAILY PRN
Status: DISCONTINUED | OUTPATIENT
Start: 2023-02-28 | End: 2023-03-02 | Stop reason: HOSPADM

## 2023-02-28 RX ORDER — GUAIFENESIN/DEXTROMETHORPHAN 100-10MG/5
5 SYRUP ORAL EVERY 4 HOURS PRN
Status: DISCONTINUED | OUTPATIENT
Start: 2023-02-28 | End: 2023-03-02 | Stop reason: HOSPADM

## 2023-02-28 RX ORDER — HYDROCODONE BITARTRATE AND HOMATROPINE METHYLBROMIDE ORAL SOLUTION 5; 1.5 MG/5ML; MG/5ML
5 LIQUID ORAL ONCE
Status: COMPLETED | OUTPATIENT
Start: 2023-02-28 | End: 2023-02-28

## 2023-02-28 RX ADMIN — APIXABAN 5 MG: 5 TABLET, FILM COATED ORAL at 08:01

## 2023-02-28 RX ADMIN — GUAIFENESIN SYRUP AND DEXTROMETHORPHAN 5 ML: 100; 10 SYRUP ORAL at 11:24

## 2023-02-28 RX ADMIN — GUAIFENESIN SYRUP AND DEXTROMETHORPHAN 5 ML: 100; 10 SYRUP ORAL at 00:24

## 2023-02-28 RX ADMIN — ALBUTEROL SULFATE 2.5 MG: 2.5 SOLUTION RESPIRATORY (INHALATION) at 00:14

## 2023-02-28 RX ADMIN — SODIUM CHLORIDE, PRESERVATIVE FREE 10 ML: 5 INJECTION INTRAVENOUS at 08:01

## 2023-02-28 RX ADMIN — ALBUTEROL SULFATE 2.5 MG: 2.5 SOLUTION RESPIRATORY (INHALATION) at 16:15

## 2023-02-28 RX ADMIN — APIXABAN 5 MG: 5 TABLET, FILM COATED ORAL at 20:48

## 2023-02-28 RX ADMIN — SODIUM CHLORIDE: 9 INJECTION, SOLUTION INTRAVENOUS at 00:24

## 2023-02-28 RX ADMIN — DILTIAZEM HYDROCHLORIDE 60 MG: 60 TABLET, FILM COATED ORAL at 11:24

## 2023-02-28 RX ADMIN — DILTIAZEM HYDROCHLORIDE 240 MG: 120 CAPSULE, COATED, EXTENDED RELEASE ORAL at 17:24

## 2023-02-28 RX ADMIN — GUAIFENESIN SYRUP AND DEXTROMETHORPHAN 5 ML: 100; 10 SYRUP ORAL at 23:59

## 2023-02-28 RX ADMIN — HYDROCODONE BITARTRATE AND HOMATROPINE METHYLBROMIDE 5 ML: 5; 1.5 SOLUTION ORAL at 05:06

## 2023-02-28 RX ADMIN — DILTIAZEM HYDROCHLORIDE 60 MG: 60 TABLET, FILM COATED ORAL at 00:24

## 2023-02-28 RX ADMIN — BENZONATATE 200 MG: 100 CAPSULE ORAL at 11:24

## 2023-02-28 RX ADMIN — BENZONATATE 200 MG: 100 CAPSULE ORAL at 23:59

## 2023-02-28 RX ADMIN — ALBUTEROL SULFATE 2.5 MG: 2.5 SOLUTION RESPIRATORY (INHALATION) at 19:49

## 2023-02-28 RX ADMIN — LISINOPRIL 10 MG: 5 TABLET ORAL at 08:00

## 2023-02-28 RX ADMIN — ALBUTEROL SULFATE 2.5 MG: 2.5 SOLUTION RESPIRATORY (INHALATION) at 08:12

## 2023-02-28 RX ADMIN — SODIUM CHLORIDE, PRESERVATIVE FREE 10 ML: 5 INJECTION INTRAVENOUS at 20:48

## 2023-02-28 RX ADMIN — DILTIAZEM HYDROCHLORIDE 60 MG: 60 TABLET, FILM COATED ORAL at 05:06

## 2023-02-28 RX ADMIN — METHYLPREDNISOLONE SODIUM SUCCINATE 40 MG: 40 INJECTION, POWDER, FOR SOLUTION INTRAMUSCULAR; INTRAVENOUS at 08:01

## 2023-02-28 RX ADMIN — ALBUTEROL SULFATE 2.5 MG: 2.5 SOLUTION RESPIRATORY (INHALATION) at 12:36

## 2023-02-28 ASSESSMENT — PAIN SCALES - GENERAL
PAINLEVEL_OUTOF10: 0
PAINLEVEL_OUTOF10: 0

## 2023-02-28 NOTE — PROGRESS NOTES
ACUTE OCCUPATIONAL THERAPY GOALS:   (Developed with and agreed upon by patient and/or caregiver.)  1. Pt will be (I) with ADL's and ambulated short distances. OCCUPATIONAL THERAPY Initial Assessment, Daily Note, Discharge, and AM          Acknowledge Orders  Time  OT Charge Capture  Rehab Caseload Tracker      Lily Martinez is a 67 y.o. male   PRIMARY DIAGNOSIS: Acute respiratory failure with hypoxia ([address]  Wheezing [R06.2]  Hypoxia [R09.02]  Atrial fibrillation with rapid ventricular response ([address] [I48.91]  Acute asthma exacerbation [J45.901]       Reason for Referral: Generalized Muscle Weakness (M62.81)  Inpatient: Payor: Kike Nova / Plan: MEDICARE PART A AND B / Product Type: *No Product type* /     ASSESSMENT:     REHAB RECOMMENDATIONS:   Recommendation to date pending progress:  Setting:  No further skilled therapy after discharge from hospital    Equipment:    None     ASSESSMENT:  Mr. Jose Shipman was admitted with above diagnosis and seen in ICU room. Pt is noted to to feel unwell but moves very well without assistance. Pt is independent with all self care and mobility with a slight decreased activity tolerance but does not need any further OT.       [address] [facility] [address] [address] AM-PAC 6 Clicks Daily Activity Inpatient Short Form:    AM-PAC Daily Activity - Inpatient   How much help is needed for putting on and taking off regular lower body clothing?: None  How much help is needed for bathing (which includes washing, rinsing, drying)?: None  How much help is needed for toileting (which includes using toilet, bedpan, or urinal)?: None  How much help is needed for putting on and taking off regular upper body clothing?: None  How much help is needed for taking care of personal grooming?: None  How much help for eating meals?: None  AM-PAC Inpatient Daily Activity Raw Score: 24  AM-PAC Inpatient ADL T-Scale Score : 57.54  ADL Inpatient CMS 0-100% Score: 0  ADL Inpatient CMS G-Code Modifier : [address] [place] [address]           SUBJECTIVE:     Mr. Jose Shipman states, \"I had Covid in December and maybe got this from that\"     Social/Functional Lives With: Spouse  Type of Home: House  Bathroom Equipment: None  Home Equipment:  (nebulizer)  ADL Assistance: Independent  Mode of Transportation: Car    OBJECTIVE:     Unice Cancel / Laura Lipa / AIRWAY: Telemetry  and ICU monitors    RESTRICTIONS/PRECAUTIONS:       PAIN: VITALS / O2:   Pre Treatment:          Post Treatment: none       Vitals          Oxygen            GROSS EVALUATION: INTACT IMPAIRED   (See Comments)   UE AROM [x] []   UE PROM [x] []   Strength [x]       Posture / Balance [x]     Sensation [x]     Coordination [x]       Tone [x]       Edema []    Activity Tolerance []  Fair      Hand Dominance R [] L []      COGNITION/  PERCEPTION: INTACT IMPAIRED   (See Comments)   Orientation [x]     Vision [x]     Hearing [x]     Cognition  [x]     Perception [x]       MOBILITY: I Mod I S SBA CGA Min Mod Max Total  NT x2 Comments:   Bed Mobility    Rolling [x] [] [] [] [] [] [] [] [] [] []    Supine to Sit [x] [] [] [] [] [] [] [] [] [] []    Scooting [x] [] [] [] [] [] [] [] [] [] []    Sit to Supine [x] [] [] [] [] [] [] [] [] [] []    Transfers    Sit to Stand [x] [] [] [] [] [] [] [] [] [] []    Bed to Chair [x] [] [] [] [] [] [] [] [] [] []    Stand to Sit [x] [] [] [] [] [] [] [] [] [] []    Tub/Shower [x] [] [] [] [] [] [] [] [] [] []     Toilet [x] [] [] [] [] [] [] [] [] [] []      [] [] [] [] [] [] [] [] [] [] []    I=Independent, Mod I=Modified Independent, S=Supervision/Setup, SBA=Standby Assistance, CGA=Contact Guard Assistance, Min=Minimal Assistance, Mod=Moderate Assistance, Max=Maximal Assistance, Total=Total Assistance, NT=Not Tested    ACTIVITIES OF DAILY LIVING: I Mod I S SBA CGA Min Mod Max Total NT Comments   BASIC ADLs:              Upper Body Bathing  [x] [] [] [] [] [] [] [] [] []    Lower Body Bathing [x] [] [] [] [] [] [] [] [] []    Toileting [x] [] [] [] [] [] [] [] [] []    Upper Body Dressing [x] [] [] [] [] [] [] [] [] []    Lower Body Dressing [x] [] [] [] [] [] [] [] [] []    Feeding [x] [] [] [] [] [] [] [] [] []    Grooming [x] [] [] [] [] [] [] [] [] []    Personal Device Care [] [] [] [] [] [] [] [] [] []    Functional Mobility [x] [] [] [] [] [] [] [] [] []    I=Independent, Mod I=Modified Independent, S=Supervision/Setup, SBA=Standby Assistance, CGA=Contact Guard Assistance, Min=Minimal Assistance, Mod=Moderate Assistance, Max=Maximal Assistance, Total=Total Assistance, NT=Not Tested    PLAN:   FREQUENCY/DURATION   OT Plan of Care:  (1 treatment) for duration of hospital stay or until stated goals are met, whichever comes first.    PROBLEM LIST:   (Skilled intervention is medically necessary to address:)  Decreased Activity Tolerance   INTERVENTIONS PLANNED:  (Benefits and precautions of occupational therapy have been discussed with the patient.)  Self Care Training  Education         TREATMENT:     EVALUATION: LOW COMPLEXITY: (Untimed Charge)    TREATMENT:   Therapeutic Activity (20 Minutes): Patient participated in therapeutic activities including functional mobility of household distances with no assistance in order to increase activity tolerance. AFTER TREATMENT PRECAUTIONS: Bed, Bed/Chair Locked, Call light within reach, Needs within reach, and RN notified    INTERDISCIPLINARY COLLABORATION:  RN/ PCT, PT/ PTA, and OT/ CARTER    EDUCATION:  Education Given To: Patient  Education Provided: Role of Therapy;Plan of Care;Energy Conservation; Fall Prevention Strategies  Education Outcome: Verbalized understanding;Demonstrated understanding    TOTAL TREATMENT DURATION AND TIME:  Time In: [facility]  Time Out: [facility]  Minutes: GERALD Agrawal

## 2023-02-28 NOTE — PLAN OF CARE
Problem: Discharge Planning  Goal: Discharge to home or other facility with appropriate resources  2/28/2023 0849 by Angela Guillen RN  Outcome: Progressing  Flowsheets (Taken 2/28/2023 0730)  Discharge to home or other facility with appropriate resources:   Identify barriers to discharge with patient and caregiver   Arrange for needed discharge resources and transportation as appropriate   Identify discharge learning needs (meds, wound care, etc)   Refer to discharge planning if patient needs post-hospital services based on physician order or complex needs related to functional status, cognitive ability or social support system  2/27/2023 2106 by Prachi Houston RN  Outcome: Progressing  Flowsheets (Taken 2/27/2023 2045)  Discharge to home or other facility with appropriate resources: Refer to discharge planning if patient needs post-hospital services based on physician order or complex needs related to functional status, cognitive ability or social support system     Problem: Pain  Goal: Verbalizes/displays adequate comfort level or baseline comfort level  2/28/2023 0849 by Angela Guillen RN  Outcome: Progressing  Flowsheets (Taken 2/28/2023 0734)  Verbalizes/displays adequate comfort level or baseline comfort level:   Encourage patient to monitor pain and request assistance   Assess pain using appropriate pain scale   Administer analgesics based on type and severity of pain and evaluate response   Implement non-pharmacological measures as appropriate and evaluate response   Consider cultural and social influences on pain and pain management   Notify Licensed Independent Practitioner if interventions unsuccessful or patient reports new pain  2/27/2023 2106 by Prachi Houston RN  Outcome: Progressing  Flowsheets (Taken 2/27/2023 2030)  Verbalizes/displays adequate comfort level or baseline comfort level: Encourage patient to monitor pain and request assistance     Problem: Safety - Adult  Goal: Free from fall injury  2/28/2023 0849 by Angela Guillen RN  Outcome: Progressing  2/27/2023 2106 by Prachi Houston RN  Outcome: Progressing

## 2023-02-28 NOTE — PROGRESS NOTES
02/28/23 0812   Oxygen Therapy/Pulse Ox   O2 Therapy Oxygen humidified   $Oxygen $Daily Charge   O2 Device High flow nasal cannula   O2 Flow Rate (L/min) 11 L/min  (wenaed to 9L)   Heart Rate 67   Resp 18   SpO2 96 % From: Matthew D Schoenwalder  To: Clif Riley  Sent: 12/29/2020 10:25 AM CST  Subject: Other    Good morning Doc.  I just wanted to drop you a quick note on the advice of the nurse that we have at work.      Xuan, Myself and Marina have all tested positive for Covid.  Our youngest daughter is negative and so far feeling normal with no symptoms.  Marina was sick for a few days with a fever of 101.  She still does not have much of an appetite but we are keeping her hydrated and for the most part she is fully recovered.  I was extremely fatigued for a few days but for the last four I just get winded and short of breathe when trying to do to much.  I was able to shovel the driveway but did take several breaks to get that accomplished.    Xuan is struggling the most.  She has almost been bed ridden for 5 days and has a cough.  When she attempts to take medication for the stuffy nose and cough she gets nauseated.  She is trying to eat what she can but that doesn't always go the best.  She is doing her best to stay hydrated and rested.  For the most part we are getting by.      Nurse Shona just thought that I should drop you a note to keep you advised as to what is going on here.  I don't think that we need anything other than to let you know.    Thanks    Matt Schoenwalder

## 2023-02-28 NOTE — PROGRESS NOTES
Hospitalist Progress Note   Admit Date:  2023  8:54 PM   Name:  Gary Grimes   Age:  67 y.o. Sex:  male  :  1950   MRN:  [id number]   Room:  [facility]    Presenting Complaint: Shortness of Breath     Reason(s) for Admission: Wheezing [R06.2]  Hypoxia [R09.02]  Atrial fibrillation with rapid ventricular response (HCC) [I48.91]  Acute asthma exacerbation [address] Course:   72M PMHx Hypertension, Positive Antinuclear antibody, Shingles presented to the ER with report of shortness of breath that had gotten progressively worsening for 3 days. Reported to dry cough and wheezing. Denied fevers, chills, nausea, vomiting. Started on Z-Jorge after visiting urgent care but had not improved. Found to be hypoxic and in atrial fibrillation with RVR. Admitted to ICU for further evaluation and management. Placed on diltiazem gtt, converted to NSR. Transitioned to oral diltiazem. Require 4-10L O2. Subjective & 24hr Events (23):   O2 requirement back up to 9L. Film array + metapneumovirus. Remains in NSR. Diltiazem gtt discontinued. Plan discussed with patient, nurse and . Assessment & Plan:   Acute respiratory failure with hypoxia  Insignificant remote smoking history, + metapneumovirus  -albuterol  -methylprednisolone    Acute asthma exacerbation  -as above    Hyponatremia  Resolved    Atrial fibrillation with rapid ventricular response  On admission HR ~140s, initially arrested with diltiazem gtt  -apixaban  -diltiazem, transition to long acting  -discontinue gtt  -consult cardiology    Mild bibasilar atelectasis  -pulmonary hygiene     Class 1 Obesity  Increased risk of all cause mortality, complicating care  - healthy lifestyle at discharge      PT/OT evals and PPD needed/ordered? Yes  Diet:  ADULT DIET;  Full Liquid  VTE prophylaxis: Already on anticoagulation  Code status: Full Code    Hospital Problems:  Principal Problem:    Acute respiratory failure with hypoxia ([address]  Active Problems:    Acute asthma exacerbation    Hyponatremia    Atrial fibrillation with rapid ventricular response (HCC)    Mild bibasilar atelectasis    Hypercoagulable state due to paroxysmal atrial fibrillation (HCC)    Class 1 obesity due to excess calories with serious comorbidity and body mass index (BMI) of 33.0 to 33.9 in adult  Resolved Problems:    * No resolved hospital problems.  *      Objective:   Patient Vitals for the past 24 hrs:   Temp Pulse Resp BP SpO2   02/28/23 1236 -- 78 17 -- 96 %   02/28/23 1124 98.1 °F (36.7 °C) 71 16 118/74 95 %   02/28/23 0812 -- 67 18 -- 96 %   02/28/23 0734 97.9 °F (36.6 °C) 70 20 124/75 95 %   02/28/23 0600 -- 72 23 121/68 --   02/28/23 0530 -- 64 16 118/65 --   02/28/23 0500 97.6 °F (36.4 °C) 68 19 120/73 97 %   02/28/23 0430 -- 67 18 123/72 97 %   02/28/23 0400 -- 61 19 120/73 94 %   02/28/23 0330 -- 64 23 120/74 94 %   02/28/23 0300 -- 68 20 119/69 96 %   02/28/23 0230 -- 64 18 122/72 95 %   02/28/23 0200 -- 71 23 116/70 97 %   02/28/23 0130 -- 64 16 123/69 92 %   02/28/23 0100 -- 77 28 131/65 94 %   02/28/23 0030 97.7 °F (36.5 °C) 83 19 (!) 114/59 96 %   02/28/23 0015 -- 84 12 -- 95 %   02/28/23 0000 -- 64 18 128/76 96 %   02/27/23 2345 -- 63 21 -- 92 %   02/27/23 2330 -- 60 18 120/70 93 %   02/27/23 2315 -- 63 19 -- 92 %   02/27/23 2300 -- 65 18 120/68 (!) 89 %   02/27/23 2245 -- 67 21 -- 95 %   02/27/23 2230 -- 71 20 119/72 94 %   02/27/23 2215 -- 82 21 -- 94 %   02/27/23 2200 -- 71 26 125/65 92 %   02/27/23 2145 -- 69 15 -- 92 %   02/27/23 2130 -- 68 19 117/65 96 %   02/27/23 2115 -- 78 (!) 32 -- 93 %   02/27/23 2106 -- 78 -- -- 94 %   02/27/23 2100 -- 79 23 (!) 119/58 93 %   02/27/23 2045 -- 82 18 -- 96 %   02/27/23 2030 97.8 °F (36.6 °C) 86 23 119/60 94 %   02/27/23 2015 -- 93 24 (!) 140/69 97 %   02/27/23 2000 -- 76 12 99/69 95 %   02/27/23 1954 -- 65 15 -- 92 %   02/27/23 1945 -- 68 19 122/70 95 %   02/27/23 1930 -- 65 17 107/68 94 % 02/27/23 1915 -- 69 19 107/68 95 %   02/27/23 1900 -- 78 23 112/72 97 %   02/27/23 1716 -- 71 19 113/63 93 %   02/27/23 1705 -- 74 -- 105/68 --   02/27/23 1701 -- 77 17 105/68 95 %   02/27/23 1646 -- 75 22 127/69 94 %   02/27/23 1631 -- 81 22 120/65 95 %   02/27/23 1616 -- 86 24 129/67 95 %   02/27/23 1601 -- 69 16 (!) 103/58 94 %   02/27/23 1556 -- 67 16 -- 91 %   02/27/23 1546 -- 70 23 111/65 95 %       Oxygen Therapy  SpO2: 96 %  Pulse via Oximetry: 67 beats per minute  Pulse Oximeter Device Mode: Continuous  Pulse Oximeter Device Location: Finger  O2 Device: High flow nasal cannula  O2 Flow Rate (L/min): 9 L/min    Estimated body mass index is 33.55 kg/m² as calculated from the following:    Height as of this encounter: 5' 10\" (1.778 m). Weight as of this encounter: 233 lb 12.8 oz (106.1 kg). Intake/Output Summary (Last 24 hours) at 2/28/2023 1539  Last data filed at 2/28/2023 0615  Gross per 24 hour   Intake 2421 ml   Output 1450 ml   Net 971 ml       Blood pressure 118/74, pulse 78, temperature 98.1 °F (36.7 °C), temperature source Temporal, resp. rate 17, height 5' 10\" (1.778 m), weight 233 lb 12.8 oz (106.1 kg), SpO2 96 %. Physical Exam  Vitals and nursing note reviewed. Constitutional:       General: He is not in acute distress. Appearance: He is obese. He is ill-appearing. He is not diaphoretic. Interventions: Nasal cannula in place. HENT:      Head: Normocephalic and atraumatic. Eyes:      Extraocular Movements: Extraocular movements intact. Cardiovascular:      Rate and Rhythm: Normal rate and regular rhythm. Pulmonary:      Effort: Accessory muscle usage and respiratory distress (mild) present. Breath sounds: No wheezing. Abdominal:      General: There is no distension. Musculoskeletal:         General: No deformity. Skin:     Coloration: Skin is not jaundiced or pale. Neurological:      General: No focal deficit present.       Mental Status: He is alert and oriented to person, place, and time. Psychiatric:         Mood and Affect: Mood normal.         Behavior: Behavior normal. Behavior is cooperative.       Comments: affable         I have personally reviewed labs and tests:  Recent Labs:  Recent Results (from the past 48 hour(s))   Magnesium    Collection Time: 02/26/23  8:13 PM   Result Value Ref Range    Magnesium 1.8 1.8 - 2.4 mg/dL   Culture, Blood 1    Collection Time: 02/26/23  8:14 PM    Specimen: Blood   Result Value Ref Range    Special Requests RIGHT  Antecubital        Culture NO GROWTH 2 DAYS     Lactate, Sepsis    Collection Time: 02/26/23  8:14 PM   Result Value Ref Range    Lactic Acid, Sepsis 1.9 0.4 - 2.0 MMOL/L   CBC with Auto Differential    Collection Time: 02/26/23  8:14 PM   Result Value Ref Range    WBC 11.0 4.3 - 11.1 K/uL    RBC 4.79 4.23 - 5.6 M/uL    Hemoglobin 14.9 13.6 - 17.2 g/dL    Hematocrit 43.9 41.1 - 50.3 %    MCV 91.6 82.0 - 102.0 FL    MCH 31.1 26.1 - 32.9 PG    MCHC 33.9 31.4 - 35.0 g/dL    RDW 13.1 11.9 - 14.6 %    Platelets 369 471 - 002 K/uL    MPV 10.4 9.4 - 12.3 FL    nRBC 0.00 0.0 - 0.2 K/uL    Differential Type AUTOMATED      Seg Neutrophils 78 43 - 78 %    Lymphocytes 8 (L) 13 - 44 %    Monocytes 13 (H) 4.0 - 12.0 %    Eosinophils % 0 (L) 0.5 - 7.8 %    Basophils 0 0.0 - 2.0 %    Immature Granulocytes 0 0.0 - 5.0 %    Segs Absolute 8.6 (H) 1.7 - 8.2 K/UL    Absolute Lymph # 0.9 0.5 - 4.6 K/UL    Absolute Mono # 1.5 (H) 0.1 - 1.3 K/UL    Absolute Eos # 0.0 0.0 - 0.8 K/UL    Basophils Absolute 0.0 0.0 - 0.2 K/UL    Absolute Immature Granulocyte 0.0 0.0 - 0.5 K/UL   CMP    Collection Time: 02/26/23  8:14 PM   Result Value Ref Range    Sodium 129 (L) 133 - 143 mmol/L    Potassium 4.1 3.5 - 5.1 mmol/L    Chloride 98 (L) 101 - 110 mmol/L    CO2 23 21 - 32 mmol/L    Anion Gap 8 2 - 11 mmol/L    Glucose 150 (H) 65 - 100 mg/dL    BUN 19 8 - 23 MG/DL    Creatinine 1.28 0.8 - 1.5 MG/DL    Est, Glom Filt Rate 59 (L) >60 ml/min/1.73m2    Calcium 8.3 8.3 - 10.4 MG/DL    Total Bilirubin 0.6 0.2 - 1.1 MG/DL    ALT 33 12 - 65 U/L    AST 28 15 - 37 U/L    Alk Phosphatase 54 50 - 136 U/L    Total Protein 6.7 6.3 - 8.2 g/dL    Albumin 3.5 3.2 - 4.6 g/dL    Globulin 3.2 2.8 - 4.5 g/dL    Albumin/Globulin Ratio 1.1 0.4 - 1.6     Procalcitonin    Collection Time: 02/26/23  8:14 PM   Result Value Ref Range    Procalcitonin 0.88 (H) 0.00 - 0.49 ng/mL   EKG 12 Lead    Collection Time: 02/26/23  8:17 PM   Result Value Ref Range    Ventricular Rate 116 BPM    Atrial Rate 116 BPM    P-R Interval 126 ms    QRS Duration 86 ms    Q-T Interval 304 ms    QTc Calculation (Bazett) 422 ms    P Axis 41 degrees    R Axis 4 degrees    T Axis 72 degrees    Diagnosis       Sinus tachycardia with Premature supraventricular complexes   Brain Natriuretic Peptide    Collection Time: 02/26/23  8:23 PM   Result Value Ref Range    NT Pro- (H) 5 - 125 PG/ML   Culture, Blood 2    Collection Time: 02/26/23  9:09 PM    Specimen: Blood   Result Value Ref Range    Special Requests NO SPECIAL REQUESTS  LEFT  HAND        Culture NO GROWTH 2 DAYS     Respiratory Panel, Molecular, with COVID-19 (Restricted: peds pts or suitable admitted adults)    Collection Time: 02/27/23  9:35 AM    Specimen: Nasopharyngeal   Result Value Ref Range    Adenovirus by PCR NOT DETECTED NOTDET      Coronavirus 229E by PCR NOT DETECTED NOTDET      Coronavirus HKU1 by PCR NOT DETECTED NOTDET      Coronavirus NL63 by PCR NOT DETECTED NOTDET      Coronavirus OC43 by PCR NOT DETECTED NOTDET      SARS-CoV-2, PCR NOT DETECTED NOTDET      Human Metapneumovirus by PCR Detected (A) NOTDET      Rhinovirus Enterovirus PCR NOT DETECTED NOTDET      Influenza A by PCR NOT DETECTED NOTDET      Influenza B PCR NOT DETECTED NOTDET      Parainfluenza 1 PCR NOT DETECTED NOTDET      Parainfluenza 2 PCR NOT DETECTED NOTDET      Parainfluenza 3 PCR NOT DETECTED NOTDET      Parainfluenza 4 PCR NOT DETECTED NOTDET Respiratory Syncytial Virus by PCR NOT DETECTED NOTDET      Bordetella parapertussis by PCR NOT DETECTED NOTDET      Bordetella pertussis by PCR NOT DETECTED NOTDET      Chlamydophila Pneumonia PCR NOT DETECTED NOTDET      Mycoplasma pneumo by PCR NOT DETECTED NOTDET     Transthoracic echocardiogram (TTE) complete with contrast, bubble, strain, and 3D PRN    Collection Time: 02/27/23 10:45 AM   Result Value Ref Range    Body Surface Area 2.29 m2    LA Minor Axis 6.7 cm    LA Major Axis 6.5 cm    LA Area 2C 28.4 cm2    LA Area 4C 24.9 cm2    LA Volume 2C 100 (A) 18 - 58 mL    LA Volume 4C 77 (A) 18 - 58 mL    LA Volume BP 89 (A) 18 - 58 mL    LV EDV A4C 165 mL    LV ESV A4C 90 mL    IVSd 0.9 0.6 - 1.0 cm    LVIDd 4.9 4.2 - 5.9 cm    LVIDs 3.8 cm    LVOT Diameter 2.5 cm    LVOT Mean Gradient 3 mmHg    LVOT VTI 21.8 cm    LVOT Peak Velocity 1.1 m/s    LVOT Peak Gradient 5 mmHg    LVPWd 0.9 0.6 - 1.0 cm    LV E' Lateral Velocity 13 cm/s    LV E' Septal Velocity 8 cm/s    LV Ejection Fraction A4C 46 %    LVOT Area 4.9 cm2    LVOT .0 ml    AV Mean Velocity 1.1 m/s    AV Mean Gradient 5 mmHg    AV Mean Gradient 5 mmHg    AV VTI 30.8 cm    AV Peak Velocity 1.5 m/s    AV Peak Gradient 9 mmHg    AV Area by VTI 3.5 cm2    AV Area by Peak Velocity 3.6 cm2    Aortic Root 3.5 cm    Ascending Aorta 3.6 cm    IVC Expiration 2.1 cm    MV E Wave Deceleration Time 172.0 ms    MV A Velocity 0.75 m/s    MV E Velocity 0.72 m/s    MV Mean Velocity 0.4 m/s    MV Mean Gradient 1 mmHg    MV VTI 23.6 cm    MV Max Velocity 0.7 m/s    MV Peak Gradient 2 mmHg    MV Area by VTI 4.5 cm2    PV AT 50.0 ms    PV Max Velocity 0.9 m/s    PV Peak Gradient 3 mmHg    RV Basal Dimension 3.2 cm    RV Free Wall Peak S' 13 cm/s    TAPSE 1.8 1.7 cm    Fractional Shortening 2D 22 28 - 44 %    LV ESV Index A4C 40 mL/m2    LV EDV Index A4C 74 mL/m2    LVIDd Index 2.20 cm/m2    LVIDs Index 1.70 cm/m2    LV RWT Ratio 0.37     LV Mass 2D 153.0 88 - 224 g LV Mass 2D Index 68.6 49 - 115 g/m2    MV E/A 0.96     E/E' Ratio (Averaged) 7.27     E/E' Lateral 5.54     E/E' Septal 9.00     LA Volume Index BP 40 (A) 16 - 34 ml/m2    LVOT Stroke Volume Index 48.0 mL/m2    LA Volume Index 2C 45 (A) 16 - 34 mL/m2    LA Volume Index 4C 35 (A) 16 - 34 mL/m2    Ao Root Index 1.57 cm/m2    Ascending Aorta Index 1.61 cm/m2    AV Velocity Ratio 0.73     LVOT:AV VTI Index 0.71     JAYLIN/BSA VTI 1.6 cm2/m2    JAYLIN/BSA Peak Velocity 1.6 cm2/m2    MV:LVOT VTI Index 1.08     Left Ventricular Ejection Fraction 53     LVEF MODALITY ECHO    Comprehensive Metabolic Panel w/ Reflex to MG    Collection Time: 02/28/23  3:27 AM   Result Value Ref Range    Sodium 136 133 - 143 mmol/L    Potassium 3.8 3.5 - 5.1 mmol/L    Chloride 108 101 - 110 mmol/L    CO2 20 (L) 21 - 32 mmol/L    Anion Gap 8 2 - 11 mmol/L    Glucose 146 (H) 65 - 100 mg/dL    BUN 19 8 - 23 MG/DL    Creatinine 0.98 0.8 - 1.5 MG/DL    Est, Glom Filt Rate >60 >60 ml/min/1.73m2    Calcium 7.6 (L) 8.3 - 10.4 MG/DL    Total Bilirubin 0.4 0.2 - 1.1 MG/DL    ALT 39 12 - 65 U/L    AST 38 (H) 15 - 37 U/L    Alk Phosphatase 44 (L) 50 - 136 U/L    Total Protein 5.8 (L) 6.3 - 8.2 g/dL    Albumin 2.6 (L) 3.2 - 4.6 g/dL    Globulin 3.2 2.8 - 4.5 g/dL    Albumin/Globulin Ratio 0.8 0.4 - 1.6     CBC with Auto Differential    Collection Time: 02/28/23  3:27 AM   Result Value Ref Range    WBC 11.2 (H) 4.3 - 11.1 K/uL    RBC 4.34 4.23 - 5.6 M/uL    Hemoglobin 13.3 (L) 13.6 - 17.2 g/dL    Hematocrit 40.0 (L) 41.1 - 50.3 %    MCV 92.2 82.0 - 102.0 FL    MCH 30.6 26.1 - 32.9 PG    MCHC 33.3 31.4 - 35.0 g/dL    RDW 13.1 11.9 - 14.6 %    Platelets 019 (L) 192 - 450 K/uL    MPV 10.9 9.4 - 12.3 FL    nRBC 0.00 0.0 - 0.2 K/uL    Differential Type AUTOMATED      Seg Neutrophils 83 (H) 43 - 78 %    Lymphocytes 7 (L) 13 - 44 %    Monocytes 9 4.0 - 12.0 %    Eosinophils % 0 (L) 0.5 - 7.8 %    Basophils 0 0.0 - 2.0 %    Immature Granulocytes 0 0.0 - 5.0 %    Segs Absolute 9.3 (H) 1.7 - 8.2 K/UL    Absolute Lymph # 0.8 0.5 - 4.6 K/UL    Absolute Mono # 1.0 0.1 - 1.3 K/UL    Absolute Eos # 0.0 0.0 - 0.8 K/UL    Basophils Absolute 0.0 0.0 - 0.2 K/UL    Absolute Immature Granulocyte 0.0 0.0 - 0.5 K/UL       I have personally reviewed imaging studies:  Other Studies:  XR CHEST PORTABLE   Final Result   -Streaky bibasilar opacities, likely reflecting atelectasis, though   infection/aspiration are also considerations in the appropriate context. CTA CHEST W WO CONTRAST PE Eval   Final Result      1. No evidence of aortic dissection or acute aortic syndrome. 2. No evidence of pulmonary embolism, accounting for mild limitation due to    respiratory motion. 3. Borderline ascending aortic aneurysm measuring 3.9 cm in diameter. 4. Impacted distal bronchi in the right lower lobe. This may represent uncleared    secretions or evidence of aspiration. 5. Right hilar lymphadenopathy, nonspecific. Neoplastic involvement cannot be    entirely excluded. Julien Olguin M.D.    2/26/2023 11:55:00 PM      XR CHEST PORTABLE   Final Result   Impression:    1. Linear streaky density left lung base consistent with scar or atelectasis. No    finding to account for shortness of breath. Slot # 92      Thank you for the referral of this patient. This exam was interpreted by an    American Board of Rad[employer] certified Diversified radiologist with subspecialty    fellowship in [place] [address]. If there are any questions regarding this exam    please feel free to contact a body radiologist directly at [phone].  Or you    can reach me personally at [email or web address].              Cornelia Kim M.D., [facility]    2/26/2023 8:50:00 PM          Current Meds:  Current Facility-Administered Medications   Medication Dose Route Frequency    guaiFENesin-dextromethorphan (ROBITUSSIN DM) 100-10 MG/5ML syrup 5 mL  5 mL Oral Q4H PRN    apixaban (ELIQUIS) tablet 5 mg  5 mg Oral BID benzonatate (TESSALON) capsule 200 mg  200 mg Oral TID PRN    dilTIAZem (CARDIZEM CD) extended release capsule 240 mg  240 mg Oral Daily    lisinopril (PRINIVIL;ZESTRIL) tablet 10 mg  10 mg Oral Daily    sodium chloride flush 0.9 % injection 5-40 mL  5-40 mL IntraVENous 2 times per day    sodium chloride flush 0.9 % injection 5-40 mL  5-40 mL IntraVENous PRN    potassium chloride (KLOR-CON M) extended release tablet 40 mEq  40 mEq Oral PRN    Or    potassium bicarb-citric acid (EFFER-K) effervescent tablet 40 mEq  40 mEq Oral PRN    Or    potassium chloride 10 mEq/100 mL IVPB (Peripheral Line)  10 mEq IntraVENous PRN    magnesium sulfate 2000 mg in 50 mL IVPB premix  2,000 mg IntraVENous PRN    ondansetron (ZOFRAN-ODT) disintegrating tablet 4 mg  4 mg Oral Q8H PRN    Or    ondansetron (ZOFRAN) injection 4 mg  4 mg IntraVENous Q6H PRN    polyethylene glycol (GLYCOLAX) packet 17 g  17 g Oral Daily PRN    acetaminophen (TYLENOL) tablet 650 mg  650 mg Oral Q6H PRN    Or    acetaminophen (TYLENOL) suppository 650 mg  650 mg Rectal Q6H PRN    albuterol (PROVENTIL) nebulizer solution 2.5 mg  2.5 mg Nebulization Q4H    methylPREDNISolone sodium (SOLU-MEDROL) injection 40 mg  40 mg IntraVENous Daily    levalbuterol (XOPENEX) nebulization 0.63 mg  0.63 mg Nebulization Q4H PRN       Signed:  Gwendolyn Radford MD

## 2023-02-28 NOTE — PLAN OF CARE
Problem: Discharge Planning  Goal: Discharge to home or other facility with appropriate resources  2/27/2023 2106 by Erum Cortes RN  Outcome: Progressing  Flowsheets (Taken 2/27/2023 2045)  Discharge to home or other facility with appropriate resources: Refer to discharge planning if patient needs post-hospital services based on physician order or complex needs related to functional status, cognitive ability or social support system  2/27/2023 0934 by Andres Nielsen RN  Outcome: Progressing  Flowsheets (Taken 2/27/2023 0710)  Discharge to home or other facility with appropriate resources:   Identify barriers to discharge with patient and caregiver   Arrange for needed discharge resources and transportation as appropriate   Identify discharge learning needs (meds, wound care, etc)   Refer to discharge planning if patient needs post-hospital services based on physician order or complex needs related to functional status, cognitive ability or social support system     Problem: Pain  Goal: Verbalizes/displays adequate comfort level or baseline comfort level  2/27/2023 2106 by Erum Cortes RN  Outcome: Progressing  Flowsheets (Taken 2/27/2023 2030)  Verbalizes/displays adequate comfort level or baseline comfort level: Encourage patient to monitor pain and request assistance  2/27/2023 0934 by Andres Nielsen RN  Outcome: Progressing  Flowsheets (Taken 2/27/2023 0730)  Verbalizes/displays adequate comfort level or baseline comfort level:   Encourage patient to monitor pain and request assistance   Assess pain using appropriate pain scale   Administer analgesics based on type and severity of pain and evaluate response   Implement non-pharmacological measures as appropriate and evaluate response   Notify Licensed Independent Practitioner if interventions unsuccessful or patient reports new pain     Problem: Safety - Adult  Goal: Free from fall injury  Outcome: Progressing

## 2023-02-28 NOTE — PROGRESS NOTES
ACUTE PHYSICAL THERAPY GOALS:   (Developed with and agreed upon by patient and/or caregiver.)  STG:  (1.)Mr. Paloma Hodges will move from supine to sit and sit to supine  with INDEPENDENT within 7 treatment day(s). (2.)Mr. Paloma Hodges will transfer from bed to chair and chair to bed with SUPERVISION using the least restrictive device within 7 treatment day(s). (3.)Mr. Paloma Hodges will ambulate with SUPERVISION for 400 feet with the least restrictive device within 7 treatment day(s). (4)Mr. Paloma Hodges will go up a flight of steps CGA in 7 days. ________________________________________________________________________________________________     PHYSICAL THERAPY Initial Assessment and AM  (Link to Caseload Tracking: PT Visit Days : 1  Acknowledge Orders  Time In/Out  PT Charge Capture  Rehab Caseload Tracker    Erwin Harvey is a 67 y.o. male   PRIMARY DIAGNOSIS: Acute respiratory failure with hypoxia ([address]  Wheezing [R06.2]  Hypoxia [R09.02]  Atrial fibrillation with rapid ventricular response ([address] [I48.91]  Acute asthma exacerbation [J45.901]       Reason for Referral: Generalized Muscle Weakness (M62.81)  Inpatient: Payor: [address] [place] [address],[address] / Plan: MEDICARE PART A AND B / Product Type: *No Product type* /     ASSESSMENT:     REHAB RECOMMENDATIONS:   Recommendation to date pending progress:  Setting:  No further skilled therapy after discharge from hospital    Equipment:    None     ASSESSMENT:  Mr. Paloma Hodges presents with general weakness admitted with above diagnosis. He is normally independent and active, lives with wife. This am, he complains of general weakness. He transfers and ambulates SBA in the patino on 9LO2 and did fine. He is coughing. O2 sats mid 90's after gait. Will follow to increase functional mobility and endurance before going home.        [facility] [address] [address] AM-PAC 6 Clicks Basic Mobility Inpatient Short Form  AM-PAC Basic Mobility - Inpatient   How much help is needed turning from your back to your side while in a flat bed without using bedrails?: None  How much help is needed moving from lying on your back to sitting on the side of a flat bed without using bedrails?: None  How much help is needed moving to and from a bed to a chair?: None  How much help is needed standing up from a chair using your arms?: None  How much help is needed walking in hospital room?: None  How much help is needed climbing 3-5 steps with a railing?: A Little  AM-PAC Inpatient Mobility Raw Score : 23  AM-PAC Inpatient T-Scale Score : 56.93  Mobility Inpatient CMS 0-100% Score: 11.2  Mobility Inpatient CMS G-Code Modifier : CI    SUBJECTIVE:   Mr. Tonya Reyes states, \"ok\"     Social/Functional Lives With: Spouse  Type of Home: House  Bathroom Equipment: None  Home Equipment:  (nebulizer)  ADL Assistance: Independent  Mode of Transportation: Car    OBJECTIVE:     PAIN: Precilla Zambrano / O2: Jackie Melendez / Jose Eduardo Hubbard / Montserrat Annay:   Pre Treatment:          Post Treatment: 0 Vitals        Oxygen   On 9LO2 throughout   Continuous Pulse Oximetry, Telemetry , and oxygen    RESTRICTIONS/PRECAUTIONS:                    GROSS EVALUATION: Intact Impaired (Comments):   AROM [x]  Le's   PROM []    Strength [x]  Le's   Balance [x] Sitting - Static: Good  Sitting - Dynamic: Good  Standing - Static: Good  Standing - Dynamic: Fair, Good   Posture [] Rounded Shoulders   Sensation []  Neuropathy in feet   Coordination [x]      Tone []     Edema []    Activity Tolerance [] Patient limited by fatigue, Patient Tolerated treatment well    []      COGNITION/  PERCEPTION: Intact Impaired (Comments):   Orientation [x]     Vision [x]     Hearing [x]     Cognition  [x]       MOBILITY: I Mod I S SBA CGA Min Mod Max Total  NT x2 Comments:   Bed Mobility    Rolling [] [] [] [] [] [] [] [] [] [] []    Supine to Sit [] [] [x] [] [] [] [] [] [] [] []    Scooting [] [] [x] [] [] [] [] [] [] [] []    Sit to Supine [] [] [x] [] [] [] [] [] [] [] []    Transfers    Sit to Stand [] [] [] [x] [] [] [] [] [] [] []    Bed to Chair [] [] [] [] [] [] [] [] [] [] []    Stand to Sit [] [] [] [x] [] [] [] [] [] [] []     [] [] [] [] [] [] [] [] [] [] []    I=Independent, Mod I=Modified Independent, S=Supervision, SBA=Standby Assistance, CGA=Contact Guard Assistance,   Min=Minimal Assistance, Mod=Moderate Assistance, Max=Maximal Assistance, Total=Total Assistance, NT=Not Tested    GAIT: I Mod I S SBA CGA Min Mod Max Total  NT x2 Comments:   Level of Assistance [] [] [] [x] [] [] [] [] [] [] []    Distance 100 feet    DME None    Gait Quality Decreased celi     Weightbearing Status      Stairs      I=Independent, Mod I=Modified Independent, S=Supervision, SBA=Standby Assistance, CGA=Contact Guard Assistance,   Min=Minimal Assistance, Mod=Moderate Assistance, Max=Maximal Assistance, Total=Total Assistance, NT=Not Tested    PLAN:   FREQUENCY AND DURATION: Daily for duration of hospital stay or until stated goals are met, whichever comes first.    THERAPY PROGNOSIS: Good    PROBLEM LIST:   (Skilled intervention is medically necessary to address:)  Decreased Activity Tolerance  Decreased Cognition  Decreased Coordination  Decreased Gait Ability  Decreased Safety Awareness  Decreased Strength  Decreased Transfer Abilities INTERVENTIONS PLANNED:   (Benefits and precautions of physical therapy have been discussed with the patient.)  Therapeutic Activity  Therapeutic Exercise/HEP  Gait Training  Education       TREATMENT:   EVALUATION: LOW COMPLEXITY: (Untimed Charge)    TREATMENT:   Therapeutic Activity (15 Minutes): Therapeutic activity included Supine to Sit, Scooting, Transfer Training, Ambulation on level ground, Sitting balance , and Standing balance to improve functional Activity tolerance, Balance, Coordination, Mobility, Strength, and ROM.     TREATMENT GRID:  N/A    AFTER TREATMENT PRECAUTIONS: Bed, Bed/Chair Locked, Call light within reach, Needs within reach, and RN notified    Sherryle Brunswick COLLABORATION:  RN/ PCT and OT/ CARTER    EDUCATION: Education Given To: Patient  Education Provided: Role of Therapy  Education Method: Demonstration;Verbal  Education Outcome: Verbalized understanding    TIME IN/OUT:  Time In: 1120  Time Out: 121 [facility]  Minutes: Trevor Calvillo PT

## 2023-03-01 LAB
ALBUMIN SERPL-MCNC: 2.7 G/DL (ref 3.2–4.6)
ALBUMIN/GLOB SERPL: 0.8 (ref 0.4–1.6)
ALP SERPL-CCNC: 44 U/L (ref 50–136)
ALT SERPL-CCNC: 48 U/L (ref 12–65)
ANION GAP SERPL CALC-SCNC: 6 MMOL/L (ref 2–11)
AST SERPL-CCNC: 43 U/L (ref 15–37)
BASOPHILS # BLD: 0 K/UL (ref 0–0.2)
BASOPHILS NFR BLD: 0 % (ref 0–2)
BILIRUB SERPL-MCNC: 0.5 MG/DL (ref 0.2–1.1)
BUN SERPL-MCNC: 19 MG/DL (ref 8–23)
CALCIUM SERPL-MCNC: 8.1 MG/DL (ref 8.3–10.4)
CHLORIDE SERPL-SCNC: 106 MMOL/L (ref 101–110)
CO2 SERPL-SCNC: 24 MMOL/L (ref 21–32)
CREAT SERPL-MCNC: 0.89 MG/DL (ref 0.8–1.5)
DIFFERENTIAL METHOD BLD: ABNORMAL
EOSINOPHIL # BLD: 0 K/UL (ref 0–0.8)
EOSINOPHIL NFR BLD: 0 % (ref 0.5–7.8)
ERYTHROCYTE [DISTWIDTH] IN BLOOD BY AUTOMATED COUNT: 13.3 % (ref 11.9–14.6)
GLOBULIN SER CALC-MCNC: 3.4 G/DL (ref 2.8–4.5)
GLUCOSE SERPL-MCNC: 142 MG/DL (ref 65–100)
HCT VFR BLD AUTO: 41.5 % (ref 41.1–50.3)
HGB BLD-MCNC: 13.8 G/DL (ref 13.6–17.2)
IMM GRANULOCYTES # BLD AUTO: 0 K/UL (ref 0–0.5)
IMM GRANULOCYTES NFR BLD AUTO: 0 % (ref 0–5)
LYMPHOCYTES # BLD: 0.9 K/UL (ref 0.5–4.6)
LYMPHOCYTES NFR BLD: 7 % (ref 13–44)
MCH RBC QN AUTO: 30.7 PG (ref 26.1–32.9)
MCHC RBC AUTO-ENTMCNC: 33.3 G/DL (ref 31.4–35)
MCV RBC AUTO: 92.4 FL (ref 82–102)
MONOCYTES # BLD: 0.8 K/UL (ref 0.1–1.3)
MONOCYTES NFR BLD: 7 % (ref 4–12)
NEUTS SEG # BLD: 10.4 K/UL (ref 1.7–8.2)
NEUTS SEG NFR BLD: 86 % (ref 43–78)
NRBC # BLD: 0 K/UL (ref 0–0.2)
PLATELET # BLD AUTO: 169 K/UL (ref 150–450)
PMV BLD AUTO: 10.8 FL (ref 9.4–12.3)
POTASSIUM SERPL-SCNC: 4.4 MMOL/L (ref 3.5–5.1)
PROT SERPL-MCNC: 6.1 G/DL (ref 6.3–8.2)
RBC # BLD AUTO: 4.49 M/UL (ref 4.23–5.6)
SODIUM SERPL-SCNC: 136 MMOL/L (ref 133–143)
WBC # BLD AUTO: 12.2 K/UL (ref 4.3–11.1)

## 2023-03-01 PROCEDURE — 6360000002 HC RX W HCPCS: Performed by: FAMILY MEDICINE

## 2023-03-01 PROCEDURE — 2700000000 HC OXYGEN THERAPY PER DAY

## 2023-03-01 PROCEDURE — 6370000000 HC RX 637 (ALT 250 FOR IP): Performed by: FAMILY MEDICINE

## 2023-03-01 PROCEDURE — 94761 N-INVAS EAR/PLS OXIMETRY MLT: CPT

## 2023-03-01 PROCEDURE — 94760 N-INVAS EAR/PLS OXIMETRY 1: CPT

## 2023-03-01 PROCEDURE — 2580000003 HC RX 258: Performed by: FAMILY MEDICINE

## 2023-03-01 PROCEDURE — 2000000000 HC ICU R&B

## 2023-03-01 PROCEDURE — 36415 COLL VENOUS BLD VENIPUNCTURE: CPT

## 2023-03-01 PROCEDURE — 6360000002 HC RX W HCPCS: Performed by: INTERNAL MEDICINE

## 2023-03-01 PROCEDURE — 80053 COMPREHEN METABOLIC PANEL: CPT

## 2023-03-01 PROCEDURE — 94640 AIRWAY INHALATION TREATMENT: CPT

## 2023-03-01 PROCEDURE — 97530 THERAPEUTIC ACTIVITIES: CPT

## 2023-03-01 PROCEDURE — 85025 COMPLETE CBC W/AUTO DIFF WBC: CPT

## 2023-03-01 RX ORDER — ALBUTEROL SULFATE 2.5 MG/3ML
2.5 SOLUTION RESPIRATORY (INHALATION)
Status: DISCONTINUED | OUTPATIENT
Start: 2023-03-01 | End: 2023-03-02 | Stop reason: HOSPADM

## 2023-03-01 RX ADMIN — SODIUM CHLORIDE, PRESERVATIVE FREE 10 ML: 5 INJECTION INTRAVENOUS at 22:14

## 2023-03-01 RX ADMIN — ALBUTEROL SULFATE 2.5 MG: 2.5 SOLUTION RESPIRATORY (INHALATION) at 08:14

## 2023-03-01 RX ADMIN — APIXABAN 5 MG: 5 TABLET, FILM COATED ORAL at 22:13

## 2023-03-01 RX ADMIN — ALBUTEROL SULFATE 2.5 MG: 2.5 SOLUTION RESPIRATORY (INHALATION) at 04:00

## 2023-03-01 RX ADMIN — SODIUM CHLORIDE, PRESERVATIVE FREE 10 ML: 5 INJECTION INTRAVENOUS at 09:27

## 2023-03-01 RX ADMIN — LISINOPRIL 10 MG: 5 TABLET ORAL at 09:22

## 2023-03-01 RX ADMIN — METHYLPREDNISOLONE SODIUM SUCCINATE 40 MG: 40 INJECTION, POWDER, FOR SOLUTION INTRAMUSCULAR; INTRAVENOUS at 09:22

## 2023-03-01 RX ADMIN — ALBUTEROL SULFATE 2.5 MG: 2.5 SOLUTION RESPIRATORY (INHALATION) at 19:15

## 2023-03-01 RX ADMIN — ALBUTEROL SULFATE 2.5 MG: 2.5 SOLUTION RESPIRATORY (INHALATION) at 00:05

## 2023-03-01 RX ADMIN — DILTIAZEM HYDROCHLORIDE 240 MG: 120 CAPSULE, COATED, EXTENDED RELEASE ORAL at 09:21

## 2023-03-01 RX ADMIN — APIXABAN 5 MG: 5 TABLET, FILM COATED ORAL at 09:22

## 2023-03-01 RX ADMIN — ALBUTEROL SULFATE 2.5 MG: 2.5 SOLUTION RESPIRATORY (INHALATION) at 14:03

## 2023-03-01 RX ADMIN — GUAIFENESIN SYRUP AND DEXTROMETHORPHAN 5 ML: 100; 10 SYRUP ORAL at 11:08

## 2023-03-01 ASSESSMENT — PAIN SCALES - GENERAL
PAINLEVEL_OUTOF10: 0

## 2023-03-01 NOTE — PROGRESS NOTES
Hospitalist Progress Note   Admit Date:  2023  8:54 PM   Name:  Johnathan Parson   Age:  67 y.o. Sex:  male  :  1950   MRN:  [id number]   Room:  [facility]/    Presenting Complaint: Shortness of Breath     Reason(s) for Admission: Wheezing [R06.2]  Hypoxia [R09.02]  Atrial fibrillation with rapid ventricular response ([facility] [address] [facility] [I48.91]  Acute asthma exacerbation [J45.901]     Hospital Course:     Copied from prior provider notes HPI/summary:  72M PMHx Hypertension, Positive Antinuclear antibody, Shingles presented to the ER with report of shortness of breath that had gotten progressively worsening for 3 days. Reported to dry cough and wheezing. Denied fevers, chills, nausea, vomiting. Started on Z-Jorge after visiting urgent care but had not improved. Found to be hypoxic and in atrial fibrillation with RVR. Admitted to ICU for further evaluation and management. Placed on diltiazem gtt, converted to NSR. Transitioned to oral diltiazem. Require 4-10L O2. Subjective & 24hr Events (23): No new complaints. Remains on nasal cannula oxygen. CTA chest on admission revealed right lower lobe bronchi secretions versus aspiration-likely related to his metapneumovirus-likely mucoid secretions. Feeling better. Nonspecific hilar adenopathy also likely related to human metapneumovirus infection. Brain natruretic peptide 286 on admission echocardiogram with preserved LVEF and no diastolic dysfunction. A-fib RVR has maintained sinus rhythm days with oral diltiazem and on apixaban. Cardiology consulted per note but no consult-pending? Discussed with patient try wean steroids, wean oxygen, consider increase activity and observe. Assessment & Plan:   Acute respiratory failure with hypoxia  Insignificant remote smoking history, + metapneumovirus  -albuterol  -methylprednisolone  3/1--- wean oxygen, wean systemic steroids-prednisone 30 mg daily. Hopefully home soon if able to get off oxygen. Leukocytosis left shift likely related to steroids. Acute asthma exacerbation  -as above  3/1--- Manera toilet-systemic steroids-wean. Hyponatremia  Resolved     Atrial fibrillation with rapid ventricular response  On admission HR ~140s, initially arrested with diltiazem gtt  -apixaban  -diltiazem, transition to long acting  -discontinue gtt  -consult cardiology  3/1----cardiology consultation pending but normal echo and back in sinus-hypoxemia likely precipitated. Will need anticoagulation at least short-term regarding A-fib RVR and if cardiology not able to see could follow-up outpatient with continue diltiazem and apixaban. Mild bibasilar atelectasis  -pulmonary hygiene   3/1--- continue same. Consider outpatient repeat imaging although findings consistent with human medicine of her virus infection     Class 1 Obesity  Increased risk of all cause mortality, complicating care  - healthy lifestyle at discharge        PT/OT evals and PPD needed/ordered? Yes  Diet:  ADULT DIET; Full Liquid  VTE prophylaxis: Already on anticoagulation  Code status: Full Code      Hospital Problems:  Principal Problem:    Acute respiratory failure with hypoxia ([address]  Active Problems:    Acute asthma exacerbation    Hyponatremia    Atrial fibrillation with rapid ventricular response (HCC)    Mild bibasilar atelectasis    Hypercoagulable state due to paroxysmal atrial fibrillation (HCC)    Class 1 obesity due to excess calories with serious comorbidity and body mass index (BMI) of 33.0 to 33.9 in adult  Resolved Problems:    * No resolved hospital problems.  *      Objective:   Patient Vitals for the past 24 hrs:   Temp Pulse Resp BP SpO2   03/01/23 1116 97.8 °F (36.6 °C) 70 15 134/77 96 %   03/01/23 1000 -- 70 17 131/80 94 %   03/01/23 0921 -- 77 -- 138/80 --   03/01/23 0905 -- 71 13 -- 95 %   03/01/23 0900 -- 73 21 138/80 94 %   03/01/23 0800 -- 76 18 (!) 130/93 94 %   03/01/23 0701 97.5 °F (36.4 °C) -- -- Nathen Garcia 114/93 --   03/01/23 0700 -- 92 13 (!) 144/93 94 %   03/01/23 0600 -- 68 19 124/77 93 %   03/01/23 0500 -- 79 18 127/79 93 %   03/01/23 0400 97 °F (36.1 °C) 69 12 138/82 95 %   03/01/23 0300 -- 67 18 138/87 95 %   03/01/23 0200 -- 65 16 134/78 91 %   03/01/23 0100 -- 78 25 134/76 93 %   03/01/23 0005 -- 72 12 -- 94 %   03/01/23 0000 98.3 °F (36.8 °C) 81 25 (!) 141/84 94 %   02/28/23 2300 -- 63 19 128/76 93 %   02/28/23 2200 -- 87 (!) 31 137/79 (!) 87 %   02/28/23 2100 -- 85 18 133/74 93 %   02/28/23 2000 98.2 °F (36.8 °C) 81 24 124/68 96 %   02/1950 -- 75 16 -- 96 %   02/28/23 1920 -- 72 22 126/69 94 %   02/28/23 1900 -- 78 25 -- 96 %   02/28/23 1724 -- 79 -- 132/71 --   02/28/23 1615 -- 72 18 -- 96 %   02/28/23 1548 98.1 °F (36.7 °C) 73 18 -- 95 %       Oxygen Therapy  SpO2: 96 %  Pulse Oximetry Type: Continuous  Pulse via Oximetry: 71 beats per minute  SPO2 High Alarm Limit: 100  SPO2 Low Alarm Limit POX: 90  Pulse Oximeter Device Mode: Continuous  Pulse Oximeter Device Location: Right, Finger  O2 Device: High flow nasal cannula  O2 Flow Rate (L/min): 4 L/min    Estimated body mass index is 33.55 kg/m² as calculated from the following:    Height as of this encounter: 5' 10\" (1.778 m). Weight as of this encounter: 233 lb 12.8 oz (106.1 kg). Intake/Output Summary (Last 24 hours) at 3/1/2023 1352  Last data filed at 3/1/2023 0701  Gross per 24 hour   Intake 1704 ml   Output 3825 ml   Net -2121 ml         Physical Exam:     Blood pressure 134/77, pulse 70, temperature 97.8 °F (36.6 °C), temperature source Oral, resp. rate 15, height 5' 10\" (1.778 m), weight 233 lb 12.8 oz (106.1 kg), SpO2 96 %. General:    Pleasant and cooperative, alert and oriented x3. Slowly improving. Head:  Normocephalic, atraumatic  Eyes:  Sclerae appear normal.  Pupils equally round. ENT:  Nares congested. Moist oral mucosa  Neck:  No restricted ROM. Trachea midline   CV:   RRR. Sinus rhythm on monitor, no gross gallop.   No JVD or HJR. Lungs:   Bilateral rhonchi. No gross rales. Normal symmetric excursion. Expiratory forced wheeze noted  Abdomen:   Soft, nontender, nondistended. Extremities: No cyanosis or clubbing. No unilateral lower extremity edema  Neuro:  CN II-XII grossly intact. No tremor, no focal motor deficits grossly. Psych:  Normal mood and affect.       I have personally reviewed labs and tests:  Recent Labs:  Recent Results (from the past 48 hour(s))   Comprehensive Metabolic Panel w/ Reflex to MG    Collection Time: 02/28/23  3:27 AM   Result Value Ref Range    Sodium 136 133 - 143 mmol/L    Potassium 3.8 3.5 - 5.1 mmol/L    Chloride 108 101 - 110 mmol/L    CO2 20 (L) 21 - 32 mmol/L    Anion Gap 8 2 - 11 mmol/L    Glucose 146 (H) 65 - 100 mg/dL    BUN 19 8 - 23 MG/DL    Creatinine 0.98 0.8 - 1.5 MG/DL    Est, Glom Filt Rate >60 >60 ml/min/1.73m2    Calcium 7.6 (L) 8.3 - 10.4 MG/DL    Total Bilirubin 0.4 0.2 - 1.1 MG/DL    ALT 39 12 - 65 U/L    AST 38 (H) 15 - 37 U/L    Alk Phosphatase 44 (L) 50 - 136 U/L    Total Protein 5.8 (L) 6.3 - 8.2 g/dL    Albumin 2.6 (L) 3.2 - 4.6 g/dL    Globulin 3.2 2.8 - 4.5 g/dL    Albumin/Globulin Ratio 0.8 0.4 - 1.6     CBC with Auto Differential    Collection Time: 02/28/23  3:27 AM   Result Value Ref Range    WBC 11.2 (H) 4.3 - 11.1 K/uL    RBC 4.34 4.23 - 5.6 M/uL    Hemoglobin 13.3 (L) 13.6 - 17.2 g/dL    Hematocrit 40.0 (L) 41.1 - 50.3 %    MCV 92.2 82.0 - 102.0 FL    MCH 30.6 26.1 - 32.9 PG    MCHC 33.3 31.4 - 35.0 g/dL    RDW 13.1 11.9 - 14.6 %    Platelets 396 (L) 046 - 450 K/uL    MPV 10.9 9.4 - 12.3 FL    nRBC 0.00 0.0 - 0.2 K/uL    Differential Type AUTOMATED      Seg Neutrophils 83 (H) 43 - 78 %    Lymphocytes 7 (L) 13 - 44 %    Monocytes 9 4.0 - 12.0 %    Eosinophils % 0 (L) 0.5 - 7.8 %    Basophils 0 0.0 - 2.0 %    Immature Granulocytes 0 0.0 - 5.0 %    Segs Absolute 9.3 (H) 1.7 - 8.2 K/UL    Absolute Lymph # 0.8 0.5 - 4.6 K/UL    Absolute Mono # 1.0 0.1 - 1.3 K/UL Absolute Eos # 0.0 0.0 - 0.8 K/UL    Basophils Absolute 0.0 0.0 - 0.2 K/UL    Absolute Immature Granulocyte 0.0 0.0 - 0.5 K/UL   Comprehensive Metabolic Panel w/ Reflex to MG    Collection Time: 03/01/23  3:16 AM   Result Value Ref Range    Sodium 136 133 - 143 mmol/L    Potassium 4.4 3.5 - 5.1 mmol/L    Chloride 106 101 - 110 mmol/L    CO2 24 21 - 32 mmol/L    Anion Gap 6 2 - 11 mmol/L    Glucose 142 (H) 65 - 100 mg/dL    BUN 19 8 - 23 MG/DL    Creatinine 0.89 0.8 - 1.5 MG/DL    Est, Glom Filt Rate >60 >60 ml/min/1.73m2    Calcium 8.1 (L) 8.3 - 10.4 MG/DL    Total Bilirubin 0.5 0.2 - 1.1 MG/DL    ALT 48 12 - 65 U/L    AST 43 (H) 15 - 37 U/L    Alk Phosphatase 44 (L) 50 - 136 U/L    Total Protein 6.1 (L) 6.3 - 8.2 g/dL    Albumin 2.7 (L) 3.2 - 4.6 g/dL    Globulin 3.4 2.8 - 4.5 g/dL    Albumin/Globulin Ratio 0.8 0.4 - 1.6     CBC with Auto Differential    Collection Time: 03/01/23  3:16 AM   Result Value Ref Range    WBC 12.2 (H) 4.3 - 11.1 K/uL    RBC 4.49 4.23 - 5.6 M/uL    Hemoglobin 13.8 13.6 - 17.2 g/dL    Hematocrit 41.5 41.1 - 50.3 %    MCV 92.4 82.0 - 102.0 FL    MCH 30.7 26.1 - 32.9 PG    MCHC 33.3 31.4 - 35.0 g/dL    RDW 13.3 11.9 - 14.6 %    Platelets 687 655 - 011 K/uL    MPV 10.8 9.4 - 12.3 FL    nRBC 0.00 0.0 - 0.2 K/uL    Differential Type AUTOMATED      Seg Neutrophils 86 (H) 43 - 78 %    Lymphocytes 7 (L) 13 - 44 %    Monocytes 7 4.0 - 12.0 %    Eosinophils % 0 (L) 0.5 - 7.8 %    Basophils 0 0.0 - 2.0 %    Immature Granulocytes 0 0.0 - 5.0 %    Segs Absolute 10.4 (H) 1.7 - 8.2 K/UL    Absolute Lymph # 0.9 0.5 - 4.6 K/UL    Absolute Mono # 0.8 0.1 - 1.3 K/UL    Absolute Eos # 0.0 0.0 - 0.8 K/UL    Basophils Absolute 0.0 0.0 - 0.2 K/UL    Absolute Immature Granulocyte 0.0 0.0 - 0.5 K/UL       I have personally reviewed imaging studies:  Other Studies:  XR CHEST PORTABLE   Final Result   -Streaky bibasilar opacities, likely reflecting atelectasis, though   infection/aspiration are also considerations in the appropriate context. CTA CHEST W WO CONTRAST PE Eval   Final Result      1. No evidence of aortic dissection or acute aortic syndrome. 2. No evidence of pulmonary embolism, accounting for mild limitation due to    respiratory motion. 3. Borderline ascending aortic aneurysm measuring 3.9 cm in diameter. 4. Impacted distal bronchi in the right lower lobe. This may represent uncleared    secretions or evidence of aspiration. 5. Right hilar lymphadenopathy, nonspecific. Neoplastic involvement cannot be    entirely excluded. Jyoti Garcia M.D.    2/26/2023 11:55:00 PM      XR CHEST PORTABLE   Final Result   Impression:    1. Linear streaky density left lung base consistent with scar or atelectasis. No    finding to account for shortness of breath. Slot # [id number]      Thank you for the referral of this patient. This exam was interpreted by an    American Board of Radiology certified Diversified radiologist with subspecialty    fellowship in [place] [address]. If there are any questions regarding this exam    please feel free to contact a body radiologist directly at [phone].  Or you    can reach me personally at [email or web address].              Joceline Bernard M.D., [facility]    2/26/2023 8:50:00 PM          Current Meds:  Current Facility-Administered Medications   Medication Dose Route Frequency    albuterol (PROVENTIL) nebulizer solution 2.5 mg  2.5 mg Nebulization Q6H WA    guaiFENesin-dextromethorphan (ROBITUSSIN DM) 100-10 MG/5ML syrup 5 mL  5 mL Oral Q4H PRN    apixaban (ELIQUIS) tablet 5 mg  5 mg Oral BID    benzonatate (TESSALON) capsule 200 mg  200 mg Oral TID PRN    dilTIAZem (CARDIZEM CD) extended release capsule 240 mg  240 mg Oral Daily    lisinopril (PRINIVIL;ZESTRIL) tablet 10 mg  10 mg Oral Daily    sodium chloride flush 0.9 % injection 5-40 mL  5-40 mL IntraVENous 2 times per day    sodium chloride flush 0.9 % injection 5-40 mL  5-40 mL IntraVENous PRN    potassium chloride (KLOR-CON M) extended release tablet 40 mEq  40 mEq Oral PRN    Or    potassium bicarb-citric acid (EFFER-K) effervescent tablet 40 mEq  40 mEq Oral PRN    Or    potassium chloride 10 mEq/100 mL IVPB (Peripheral Line)  10 mEq IntraVENous PRN    magnesium sulfate 2000 mg in 50 mL IVPB premix  2,000 mg IntraVENous PRN    ondansetron (ZOFRAN-ODT) disintegrating tablet 4 mg  4 mg Oral Q8H PRN    Or    ondansetron (ZOFRAN) injection 4 mg  4 mg IntraVENous Q6H PRN    polyethylene glycol (GLYCOLAX) packet 17 g  17 g Oral Daily PRN    acetaminophen (TYLENOL) tablet 650 mg  650 mg Oral Q6H PRN    Or    acetaminophen (TYLENOL) suppository 650 mg  650 mg Rectal Q6H PRN    methylPREDNISolone sodium (SOLU-MEDROL) injection 40 mg  40 mg IntraVENous Daily    levalbuterol (XOPENEX) nebulization 0.63 mg  0.63 mg Nebulization Q4H PRN       Signed:  Randolph Knight DO    Part of this note may have been written by using a voice dictation software. The note has been proof read but may still contain some grammatical/other typographical errors.

## 2023-03-01 NOTE — PROGRESS NOTES
ACUTE PHYSICAL THERAPY GOALS:   (Developed with and agreed upon by patient and/or caregiver.)  STG:  (1.)Mr. Karyle Forte will move from supine to sit and sit to supine  with INDEPENDENT within 7 treatment day(s). (2.)Mr. Karyle Forte will transfer from bed to chair and chair to bed with SUPERVISION using the least restrictive device within 7 treatment day(s). (3.)Mr. Karyle Forte will ambulate with SUPERVISION for 400 feet with the least restrictive device within 7 treatment day(s). (4)Mr. Karyle Forte will go up a flight of steps CGA in 7 days. ________________________________________________________________________________________________     PHYSICAL THERAPY Daily Note and AM  (Link to Caseload Tracking: PT Visit Days : 2  Acknowledge Orders  Time In/Out  PT Charge Capture  Rehab Caseload Tracker    Johnathan Parson is a 67 y.o. male   PRIMARY DIAGNOSIS: Acute respiratory failure with hypoxia ([address]  Wheezing [R06.2]  Hypoxia [R09.02]  Atrial fibrillation with rapid ventricular response ([address] [I48.91]  Acute asthma exacerbation [J45.901]       Reason for Referral: Generalized Muscle Weakness (M62.81)  Inpatient: Payor: Rashaun García / Plan: MEDICARE PART A AND B / Product Type: *No Product type* /     ASSESSMENT:     REHAB RECOMMENDATIONS:   Recommendation to date pending progress:  Setting:  No further skilled therapy after discharge from hospital    Equipment:    None     ASSESSMENT:  Mr. Karyle Forte presents with general weakness admitted with above diagnosis. He is normally independent and active, lives with wife. This am 2/28, he complains of general weakness. He transfers and ambulates SBA in the patino on 9LO2 and did fine. He is coughing. O2 sats mid 90's after gait. Will follow to increase functional mobility and endurance before going home. 3/1 AM; he is up in recliner.  He performs exs with B U and Les and O2 sat remained stable on 4 lpm. He transfers with SBA and then amb 150' without a device on 4 lpm with SBA. His sats remained stable, but he did begin coughing once we returned to room. Stays up in recliner. Making progress.      [facility] [place] [address] [address] [facility] 6 Clicks Basic Mobility Inpatient Short Form  AM-[facility] Basic Mobility - Inpatient   How much help is needed turning from your back to your side while in a flat bed without using bedrails?: None  How much help is needed moving from lying on your back to sitting on the side of a flat bed without using bedrails?: None  How much help is needed moving to and from a bed to a chair?: None  How much help is needed standing up from a chair using your arms?: None  How much help is needed walking in hospital room?: None  How much help is needed climbing 3-5 steps with a railing?: A Little  [facility] Inpatient Mobility Raw Score : 23  AM-[facility]AC Inpatient T-Scale Score : 56.93  Mobility Inpatient CMS 0-100% Score: 11.2  Mobility Inpatient CMS G-Code Modifier : CI    SUBJECTIVE:   Mr. Taylor Parnell states, \"I'm better\"     Social/Functional Lives With: Spouse  Type of Home: House  Bathroom Equipment: None  Home Equipment:  (nebulizer)  ADL Assistance: Independent  Mode of Transportation: Car    OBJECTIVE:     PAIN: Champ Freud / O2: Laurie Mcdaniel / John De La Garza / Akosua Dhillonty:   Pre Treatment:   Pain Assessment: None - Denies Pain      Post Treatment: 0 Vitals        Oxygen   On 4LO2 throughout   Continuous Pulse Oximetry, Telemetry , and oxygen    RESTRICTIONS/PRECAUTIONS:                    GROSS EVALUATION: Intact Impaired (Comments):   AROM [x]  Le's   PROM []    Strength [x]  Le's grossly 4/5 B   Balance [x]     Posture [] Rounded Shoulders   Sensation []  Neuropathy in feet   Coordination [x]      Tone [x]     Edema []    Activity Tolerance []  Improving, but still requires 4 lpm for amb 150' and exs    []      COGNITION/  PERCEPTION: Intact Impaired (Comments):   Orientation [x]     Vision [x]     Hearing [x]     Cognition  [x]       MOBILITY: I Mod I S SBA CGA Min Mod Max Total  NT x2 Comments:   Bed Mobility    Rolling [] [] [] [] [] [] [] [] [] [] []    Supine to Sit [] [] [] [] [] [] [] [] [] [] []    Scooting [] [] [] [] [] [] [] [] [] [] []    Sit to Supine [] [] [] [] [] [] [] [] [] [] []    Transfers    Sit to Stand [] [] [] [x] [] [] [] [] [] [] []    Bed to Chair [] [] [] [x] [] [] [] [] [] [] []    Stand to Sit [] [] [] [x] [] [] [] [] [] [] []     [] [] [] [] [] [] [] [] [] [] []    I=Independent, Mod I=Modified Independent, S=Supervision, SBA=Standby Assistance, CGA=Contact Guard Assistance,   Min=Minimal Assistance, Mod=Moderate Assistance, Max=Maximal Assistance, Total=Total Assistance, NT=Not Tested    GAIT: I Mod I S SBA CGA Min Mod Max Total  NT x2 Comments:   Level of Assistance [] [] [] [x] [] [] [] [] [] [] []    Distance 150 feet    DME None    Gait Quality Decreased celi     Weightbearing Status      Stairs      I=Independent, Mod I=Modified Independent, S=Supervision, SBA=Standby Assistance, CGA=Contact Guard Assistance,   Min=Minimal Assistance, Mod=Moderate Assistance, Max=Maximal Assistance, Total=Total Assistance, NT=Not Tested    PLAN:   FREQUENCY AND DURATION: Daily for duration of hospital stay or until stated goals are met, whichever comes first.    THERAPY PROGNOSIS: Good    PROBLEM LIST:   (Skilled intervention is medically necessary to address:)  Decreased Activity Tolerance  Decreased Cognition  Decreased Coordination  Decreased Gait Ability  Decreased Safety Awareness  Decreased Strength  Decreased Transfer Abilities INTERVENTIONS PLANNED:   (Benefits and precautions of physical therapy have been discussed with the patient.)  Therapeutic Activity  Therapeutic Exercise/HEP  Gait Training  Education       TREATMENT:   EVALUATION:     TREATMENT:   Therapeutic Activity (25 Minutes):  Therapeutic activity included Supine to Sit, Scooting, Transfer Training, Ambulation on level ground, Sitting balance , Standing balance, and exs with all 4 extremities to improve functional Activity tolerance, Balance, Coordination, Mobility, Strength, and ROM. TREATMENT GRID:   Date:  3/1/23 Date:   Date:     Activity/Exercise Parameters Parameters Parameters   SITTING: ankle DF/PF 10          Hip AB/AD with knee extend 10          Hip flex 10          LAQ 10          Shldr flex 10          Elbow flex/ext 10           O2 sat stable @ 96% with O2 at 4 lpm. Encouraged him to continue to exs throughout the day    AFTER TREATMENT PRECAUTIONS: Bed/Chair Locked, Call light within reach, Chair, Needs within reach, and RN notified    INTERDISCIPLINARY COLLABORATION:  MD/ PA/ NP , RN/ PCT, PT/ PTA, and RN Case Manager/      EDUCATION: Education Given To: Patient  Education Provided: Role of Therapy;Plan of Care;Home Exercise Program;Precautions;Transfer Training; Fall Prevention Strategies  Education Method: Demonstration;Verbal;Teach Back  Education Outcome: Verbalized understanding;Demonstrated understanding    TIME IN/OUT:  Time In: 0820  Time Out: 0845  Minutes: Ana Chun, PT

## 2023-03-01 NOTE — PROGRESS NOTES
Patient noted to have excessive coughing keeping him awake. Administered 200 mg Tessalon pearls and 5 ml Robitussin syrup. Will monitor effectiveness.

## 2023-03-01 NOTE — PLAN OF CARE
Problem: Discharge Planning  Goal: Discharge to home or other facility with appropriate resources  2/28/2023 2157 by Lawrence Baker RN  Outcome: Progressing  Flowsheets (Taken 2/28/2023 2000)  Discharge to home or other facility with appropriate resources:   Identify barriers to discharge with patient and caregiver   Arrange for needed discharge resources and transportation as appropriate   Identify discharge learning needs (meds, wound care, etc)   Arrange for interpreters to assist at discharge as needed   Refer to discharge planning if patient needs post-hospital services based on physician order or complex needs related to functional status, cognitive ability or social support system  2/28/2023 0849 by Osmar Justice RN  Outcome: Progressing  Flowsheets (Taken 2/28/2023 0730)  Discharge to home or other facility with appropriate resources:   Identify barriers to discharge with patient and caregiver   Arrange for needed discharge resources and transportation as appropriate   Identify discharge learning needs (meds, wound care, etc)   Refer to discharge planning if patient needs post-hospital services based on physician order or complex needs related to functional status, cognitive ability or social support system     Problem: Pain  Goal: Verbalizes/displays adequate comfort level or baseline comfort level  2/28/2023 2157 by Lawrence Baker RN  Outcome: Progressing  Flowsheets (Taken 2/28/2023 2000)  Verbalizes/displays adequate comfort level or baseline comfort level:   Encourage patient to monitor pain and request assistance   Assess pain using appropriate pain scale   Implement non-pharmacological measures as appropriate and evaluate response   Administer analgesics based on type and severity of pain and evaluate response   Consider cultural and social influences on pain and pain management   Notify Licensed Independent Practitioner if interventions unsuccessful or patient reports new pain  2/28/2023 0849 by Olvin Chambers, RN  Outcome: Progressing  Flowsheets (Taken 2/28/2023 2383)  Verbalizes/displays adequate comfort level or baseline comfort level:   Encourage patient to monitor pain and request assistance   Assess pain using appropriate pain scale   Administer analgesics based on type and severity of pain and evaluate response   Implement non-pharmacological measures as appropriate and evaluate response   Consider cultural and social influences on pain and pain management   Notify Licensed Independent Practitioner if interventions unsuccessful or patient reports new pain     Problem: Safety - Adult  Goal: Free from fall injury  2/28/2023 2157 by Ramsey Hough RN  Outcome: Progressing  2/28/2023 0849 by Olvin Chambers, RN  Outcome: Progressing

## 2023-03-01 NOTE — CARE COORDINATION
Discharge planning was discussed with the Critical Care Interdisciplinary Team. He is currently requiring oxygen and is not yet medically ready for discharge. Case management will continue to follow him for discharge planning needs.

## 2023-03-01 NOTE — PLAN OF CARE
Problem: Discharge Planning  Goal: Discharge to home or other facility with appropriate resources  3/1/2023 0941 by Kyra Gallegos RN  Outcome: Progressing  Flowsheets (Taken 3/1/2023 0800)  Discharge to home or other facility with appropriate resources: Identify barriers to discharge with patient and caregiver  2/28/2023 2157 by Bailey Ribeiro RN  Outcome: Progressing  Flowsheets (Taken 2/28/2023 2000)  Discharge to home or other facility with appropriate resources:   Identify barriers to discharge with patient and caregiver   Arrange for needed discharge resources and transportation as appropriate   Identify discharge learning needs (meds, wound care, etc)   Arrange for interpreters to assist at discharge as needed   Refer to discharge planning if patient needs post-hospital services based on physician order or complex needs related to functional status, cognitive ability or social support system     Problem: Pain  Goal: Verbalizes/displays adequate comfort level or baseline comfort level  3/1/2023 0941 by Kyra Gallegos RN  Outcome: Progressing  Flowsheets  Taken 3/1/2023 0400 by Bailey Ribeiro RN  Verbalizes/displays adequate comfort level or baseline comfort level:   Encourage patient to monitor pain and request assistance   Assess pain using appropriate pain scale   Administer analgesics based on type and severity of pain and evaluate response   Implement non-pharmacological measures as appropriate and evaluate response   Consider cultural and social influences on pain and pain management   Notify Licensed Independent Practitioner if interventions unsuccessful or patient reports new pain  Taken 3/1/2023 0000 by Bailey Ribeiro RN  Verbalizes/displays adequate comfort level or baseline comfort level:   Encourage patient to monitor pain and request assistance   Assess pain using appropriate pain scale   Administer analgesics based on type and severity of pain and evaluate response   Notify Licensed Independent Practitioner if interventions unsuccessful or patient reports new pain   Consider cultural and social influences on pain and pain management   Implement non-pharmacological measures as appropriate and evaluate response  2/28/2023 2157 by Edwige Rowland RN  Outcome: Progressing  Flowsheets (Taken 2/28/2023 2000)  Verbalizes/displays adequate comfort level or baseline comfort level:   Encourage patient to monitor pain and request assistance   Assess pain using appropriate pain scale   Implement non-pharmacological measures as appropriate and evaluate response   Administer analgesics based on type and severity of pain and evaluate response   Consider cultural and social influences on pain and pain management   Notify Licensed Independent Practitioner if interventions unsuccessful or patient reports new pain     Problem: Respiratory - Adult  Goal: Achieves optimal ventilation and oxygenation  Outcome: Progressing     Problem: Cardiovascular - Adult  Goal: Maintains optimal cardiac output and hemodynamic stability  Outcome: Progressing  Goal: Absence of cardiac dysrhythmias or at baseline  Outcome: Progressing     Problem: Safety - Adult  Goal: Free from fall injury  3/1/2023 0941 by Lupe Garcia RN  Outcome: Progressing  2/28/2023 2157 by Edwige Rowland RN  Outcome: Progressing

## 2023-03-02 VITALS
TEMPERATURE: 98.3 F | HEIGHT: 70 IN | WEIGHT: 226 LBS | DIASTOLIC BLOOD PRESSURE: 96 MMHG | OXYGEN SATURATION: 91 % | RESPIRATION RATE: 13 BRPM | SYSTOLIC BLOOD PRESSURE: 168 MMHG | BODY MASS INDEX: 32.35 KG/M2 | HEART RATE: 79 BPM

## 2023-03-02 LAB
ALBUMIN SERPL-MCNC: 2.7 G/DL (ref 3.2–4.6)
ALBUMIN/GLOB SERPL: 0.8 (ref 0.4–1.6)
ALP SERPL-CCNC: 47 U/L (ref 50–136)
ALT SERPL-CCNC: 53 U/L (ref 12–65)
ANION GAP SERPL CALC-SCNC: 4 MMOL/L (ref 2–11)
AST SERPL-CCNC: 42 U/L (ref 15–37)
BASOPHILS # BLD: 0.1 K/UL (ref 0–0.2)
BASOPHILS NFR BLD: 1 % (ref 0–2)
BILIRUB SERPL-MCNC: 0.6 MG/DL (ref 0.2–1.1)
BUN SERPL-MCNC: 22 MG/DL (ref 8–23)
CALCIUM SERPL-MCNC: 8.1 MG/DL (ref 8.3–10.4)
CHLORIDE SERPL-SCNC: 105 MMOL/L (ref 101–110)
CO2 SERPL-SCNC: 26 MMOL/L (ref 21–32)
CREAT SERPL-MCNC: 0.96 MG/DL (ref 0.8–1.5)
DIFFERENTIAL METHOD BLD: ABNORMAL
EOSINOPHIL # BLD: 0 K/UL (ref 0–0.8)
EOSINOPHIL NFR BLD: 0 % (ref 0.5–7.8)
ERYTHROCYTE [DISTWIDTH] IN BLOOD BY AUTOMATED COUNT: 13.2 % (ref 11.9–14.6)
GLOBULIN SER CALC-MCNC: 3.2 G/DL (ref 2.8–4.5)
GLUCOSE SERPL-MCNC: 124 MG/DL (ref 65–100)
HCT VFR BLD AUTO: 42 % (ref 41.1–50.3)
HGB BLD-MCNC: 14.3 G/DL (ref 13.6–17.2)
IMM GRANULOCYTES # BLD AUTO: 0.2 K/UL (ref 0–0.5)
IMM GRANULOCYTES NFR BLD AUTO: 2 % (ref 0–5)
LYMPHOCYTES # BLD: 1.3 K/UL (ref 0.5–4.6)
LYMPHOCYTES NFR BLD: 11 % (ref 13–44)
MCH RBC QN AUTO: 30.8 PG (ref 26.1–32.9)
MCHC RBC AUTO-ENTMCNC: 34 G/DL (ref 31.4–35)
MCV RBC AUTO: 90.3 FL (ref 82–102)
MONOCYTES # BLD: 1.1 K/UL (ref 0.1–1.3)
MONOCYTES NFR BLD: 9 % (ref 4–12)
NEUTS SEG # BLD: 9.7 K/UL (ref 1.7–8.2)
NEUTS SEG NFR BLD: 78 % (ref 43–78)
NRBC # BLD: 0 K/UL (ref 0–0.2)
PLATELET # BLD AUTO: 186 K/UL (ref 150–450)
PMV BLD AUTO: 10.8 FL (ref 9.4–12.3)
POTASSIUM SERPL-SCNC: 4.3 MMOL/L (ref 3.5–5.1)
PROT SERPL-MCNC: 5.9 G/DL (ref 6.3–8.2)
RBC # BLD AUTO: 4.65 M/UL (ref 4.23–5.6)
SODIUM SERPL-SCNC: 135 MMOL/L (ref 133–143)
WBC # BLD AUTO: 12.4 K/UL (ref 4.3–11.1)

## 2023-03-02 PROCEDURE — 36415 COLL VENOUS BLD VENIPUNCTURE: CPT

## 2023-03-02 PROCEDURE — 6370000000 HC RX 637 (ALT 250 FOR IP): Performed by: FAMILY MEDICINE

## 2023-03-02 PROCEDURE — 6360000002 HC RX W HCPCS: Performed by: INTERNAL MEDICINE

## 2023-03-02 PROCEDURE — 94761 N-INVAS EAR/PLS OXIMETRY MLT: CPT

## 2023-03-02 PROCEDURE — 85025 COMPLETE CBC W/AUTO DIFF WBC: CPT

## 2023-03-02 PROCEDURE — 2580000003 HC RX 258: Performed by: FAMILY MEDICINE

## 2023-03-02 PROCEDURE — 80053 COMPREHEN METABOLIC PANEL: CPT

## 2023-03-02 PROCEDURE — 6370000000 HC RX 637 (ALT 250 FOR IP): Performed by: INTERNAL MEDICINE

## 2023-03-02 PROCEDURE — 97530 THERAPEUTIC ACTIVITIES: CPT

## 2023-03-02 PROCEDURE — 94640 AIRWAY INHALATION TREATMENT: CPT

## 2023-03-02 RX ORDER — ALBUTEROL SULFATE 2.5 MG/3ML
2.5 SOLUTION RESPIRATORY (INHALATION)
Qty: 120 EACH | Refills: 0 | Status: SHIPPED | OUTPATIENT
Start: 2023-03-02

## 2023-03-02 RX ORDER — PREDNISONE 10 MG/1
TABLET ORAL
Qty: 12 TABLET | Refills: 0 | Status: SHIPPED | OUTPATIENT
Start: 2023-03-02 | End: 2023-03-13 | Stop reason: CLARIF

## 2023-03-02 RX ORDER — DILTIAZEM HYDROCHLORIDE 240 MG/1
240 CAPSULE, COATED, EXTENDED RELEASE ORAL DAILY
Qty: 30 CAPSULE | Refills: 0 | Status: SHIPPED | OUTPATIENT
Start: 2023-03-03 | End: 2023-03-08

## 2023-03-02 RX ADMIN — PREDNISONE 30 MG: 5 TABLET ORAL at 09:50

## 2023-03-02 RX ADMIN — ALBUTEROL SULFATE 2.5 MG: 2.5 SOLUTION RESPIRATORY (INHALATION) at 08:17

## 2023-03-02 RX ADMIN — APIXABAN 5 MG: 5 TABLET, FILM COATED ORAL at 09:50

## 2023-03-02 RX ADMIN — LISINOPRIL 10 MG: 5 TABLET ORAL at 09:50

## 2023-03-02 RX ADMIN — ALBUTEROL SULFATE 2.5 MG: 2.5 SOLUTION RESPIRATORY (INHALATION) at 02:13

## 2023-03-02 RX ADMIN — GUAIFENESIN SYRUP AND DEXTROMETHORPHAN 5 ML: 100; 10 SYRUP ORAL at 01:00

## 2023-03-02 RX ADMIN — SODIUM CHLORIDE, PRESERVATIVE FREE 10 ML: 5 INJECTION INTRAVENOUS at 09:51

## 2023-03-02 RX ADMIN — DILTIAZEM HYDROCHLORIDE 240 MG: 120 CAPSULE, COATED, EXTENDED RELEASE ORAL at 09:50

## 2023-03-02 ASSESSMENT — PAIN SCALES - GENERAL: PAINLEVEL_OUTOF10: 0

## 2023-03-02 NOTE — CARE COORDINATION
03/02/23 1230   Service Assessment   Patient Orientation Alert and Oriented   Cognition Alert   History Provided By Patient   Primary [facility] Staff   Prior Functional Level Independent in ADLs/IADLs   Current Functional Level Independent in ADLs/IADLs   Can patient return to prior living arrangement Yes   Ability to make needs known: Good   Family able to assist with home care needs: Yes   Would you like for me to discuss the discharge plan with any other family members/significant others, and if so, who? Yes  Almer Smoker, spouse)   Financial Resources Medicare   Community Resources None   Social/Functional History   Lives With Spouse   Type of [address]   (nebulizer)   ADL Assistance Independent   Mode of Transportation Car   Discharge Planning   Type of [place] Spouse/Significant Other   Current Services Prior To Admission [address] [place] [address] Dr Needed Other (Comment)  (He was provided with Eliquis coupons)   DME Ordered? No   Potential Assistance Purchasing Medications No   Type of Home Care Services None   [facility]   Transition of Care Consult (CM Consult) N/A   Services At/After Discharge None     RN CM met with the patient at the bedside and discussed discharge planning. He confirmed he is in agreement to discharge home today with his spouse. He is independent of ADLs and confirmed he has a nebulizer. RN CM encouraged him to ask questions. He verbalized understanding and agreement with the discharge plan. No case management needs were identified. The Important Message from Medicare letter was delivered and explained to the patient. He verbalized understanding of the information and signed the letter. The patient was given a copy of the letter and the original was placed in the chart.

## 2023-03-02 NOTE — PLAN OF CARE
Problem: Discharge Planning  Goal: Discharge to home or other facility with appropriate resources  Outcome: Progressing  Flowsheets (Taken 3/1/2023 1945)  Discharge to home or other facility with appropriate resources: Refer to discharge planning if patient needs post-hospital services based on physician order or complex needs related to functional status, cognitive ability or social support system     Problem: Pain  Goal: Verbalizes/displays adequate comfort level or baseline comfort level  Outcome: Progressing  Flowsheets (Taken 3/1/2023 1945)  Verbalizes/displays adequate comfort level or baseline comfort level: Encourage patient to monitor pain and request assistance     Problem: Respiratory - Adult  Goal: Achieves optimal ventilation and oxygenation  Outcome: Progressing  Flowsheets (Taken 3/1/2023 1945)  Achieves optimal ventilation and oxygenation:   Assess for changes in respiratory status   Assess for changes in mentation and behavior   Position to facilitate oxygenation and minimize respiratory effort     Problem: Cardiovascular - Adult  Goal: Maintains optimal cardiac output and hemodynamic stability  Outcome: Progressing  Goal: Absence of cardiac dysrhythmias or at baseline  Outcome: Progressing     Problem: Safety - Adult  Goal: Free from fall injury  Outcome: Progressing  Flowsheets (Taken 3/1/2023 1945)  Free From Fall Injury: Instruct family/caregiver on patient safety

## 2023-03-02 NOTE — PLAN OF CARE
Problem: Discharge Planning  Goal: Discharge to home or other facility with appropriate resources  3/2/2023 1238 by Nando Leonard RN  Outcome: Adequate for Discharge  Flowsheets (Taken 3/2/2023 0900)  Discharge to home or other facility with appropriate resources: Identify barriers to discharge with patient and caregiver  3/2/2023 0037 by Cuco Baker RN  Outcome: Progressing  Flowsheets (Taken 3/1/2023 1945)  Discharge to home or other facility with appropriate resources: Refer to discharge planning if patient needs post-hospital services based on physician order or complex needs related to functional status, cognitive ability or social support system     Problem: Pain  Goal: Verbalizes/displays adequate comfort level or baseline comfort level  3/2/2023 1238 by Nando Leonard RN  Outcome: Adequate for Discharge  3/2/2023 0037 by Cuco Baker RN  Outcome: Progressing  Flowsheets (Taken 3/1/2023 1945)  Verbalizes/displays adequate comfort level or baseline comfort level: Encourage patient to monitor pain and request assistance     Problem: Respiratory - Adult  Goal: Achieves optimal ventilation and oxygenation  3/2/2023 1238 by Nando Leonard RN  Outcome: Adequate for Discharge  Flowsheets (Taken 3/2/2023 0900)  Achieves optimal ventilation and oxygenation:   Assess for changes in respiratory status   Assess for changes in mentation and behavior   Position to facilitate oxygenation and minimize respiratory effort   Oxygen supplementation based on oxygen saturation or arterial blood gases   Assess and instruct to report shortness of breath or any respiratory difficulty   Respiratory therapy support as indicated  3/2/2023 0037 by Cuco Baker RN  Outcome: Progressing  Flowsheets (Taken 3/1/2023 1945)  Achieves optimal ventilation and oxygenation:   Assess for changes in respiratory status   Assess for changes in mentation and behavior   Position to facilitate oxygenation and minimize respiratory effort     Problem: Cardiovascular - Adult  Goal: Maintains optimal cardiac output and hemodynamic stability  3/2/2023 1238 by Jalil Baxter RN  Outcome: Adequate for Discharge  3/2/2023 0037 by Ame Putnam RN  Outcome: Progressing  Goal: Absence of cardiac dysrhythmias or at baseline  3/2/2023 1238 by Jalil Baxter RN  Outcome: Adequate for Discharge  3/2/2023 0037 by Ame Putnam RN  Outcome: Progressing     Problem: Safety - Adult  Goal: Free from fall injury  3/2/2023 1238 by Jalil Baxter RN  Outcome: Adequate for Discharge  3/2/2023 0037 by Ame Putnam RN  Outcome: Progressing  Flowsheets (Taken 3/1/2023 1945)  Free From Fall Injury: Instruct family/caregiver on patient safety

## 2023-03-02 NOTE — PROGRESS NOTES
ACUTE PHYSICAL THERAPY GOALS:   (Developed with and agreed upon by patient and/or caregiver.)  STG:  (1.)Mr. Low Engel will move from supine to sit and sit to supine  with INDEPENDENT within 7 treatment day(s). (2.)Mr. Low Engel will transfer from bed to chair and chair to bed with SUPERVISION using the least restrictive device within 7 treatment day(s). (3.)Mr. Low Engel will ambulate with SUPERVISION for 400 feet with the least restrictive device within 7 treatment day(s). (4)Mr. Low Engel will go up a flight of steps CGA in 7 days. ________________________________________________________________________________________________     PHYSICAL THERAPY Daily Note and AM  (Link to Caseload Tracking: PT Visit Days : 3  Acknowledge Orders  Time In/Out  PT Charge Capture  Rehab Caseload Tracker    Sonja Machado is a 67 y.o. male   PRIMARY DIAGNOSIS: Acute respiratory failure with hypoxia ([address]  Wheezing [R06.2]  Hypoxia [R09.02]  Atrial fibrillation with rapid ventricular response ([address] [I48.91]  Acute asthma exacerbation [J45.901]       Reason for Referral: Generalized Muscle Weakness (M62.81)  Inpatient: Payor: Solo Flynn / Plan: MEDICARE PART A AND B / Product Type: *No Product type* /     ASSESSMENT:     REHAB RECOMMENDATIONS:   Recommendation to date pending progress:  Setting:  No further skilled therapy after discharge from hospital    Equipment:    None     ASSESSMENT:  Mr. Low Engel presents with general weakness admitted with above diagnosis. He is normally independent and active, lives with wife. This am 2/28, he complains of general weakness. He transfers and ambulates SBA in the patino on 9LO2 and did fine. He is coughing. O2 sats mid 90's after gait. Will follow to increase functional mobility and endurance before going home. 3/1 AM; he is up in recliner.  He performs exs with B U and Les and O2 sat remained stable on 4 lpm. He transfers with SBA and then amb 150' without a device on 4 lpm with SBA. His sats remained stable, but he did begin coughing once we returned to room. Stays up in recliner. Making progress. 3/2 sitting in the recliner upon arrival.  No 02. Sit>stand with SBA and sherley gown on independently. Ambulated 150 ft x 3 using no AD and SBA with 02 sats in the 90%. Return to the room. Stood and performs B LE exercises )see below) in standing. Remain in the chair with needs in reach and instructed to call for assists. Pt participated very well. No LOB during gait.        [facility] [address] [id number] AM-PAC 6 Clicks Basic Mobility Inpatient Short Form  AM-PAC Basic Mobility - Inpatient   How much help is needed turning from your back to your side while in a flat bed without using bedrails?: None  How much help is needed moving from lying on your back to sitting on the side of a flat bed without using bedrails?: None  How much help is needed moving to and from a bed to a chair?: None  How much help is needed standing up from a chair using your arms?: None  How much help is needed walking in hospital room?: None  How much help is needed climbing 3-5 steps with a railing?: A Little  [facility] Inpatient Mobility Raw Score : 23  AM-PAC Inpatient T-Scale Score : 56.93  Mobility Inpatient CMS 0-100% Score: 11.2  Mobility Inpatient CMS G-Code Modifier : CI    SUBJECTIVE:   Mr. Rasheeda Brown I am ready to walk    Social/Functional Lives With: Spouse  Type of Home: [employer] Equipment: None  Home Equipment:  (nebulizer)  ADL Assistance: Independent  Mode of Transportation: Car    OBJECTIVE:     PAIN: Naomia Estrellita / O2: PRECAUTION / Kacy Ramp / Cherylin Lard:   Pre Treatment: 0/10         Post Treatment: 0 Vitals        Oxygen RA      Continuous Pulse Oximetry, Telemetry , and oxygen    RESTRICTIONS/PRECAUTIONS:                    GROSS EVALUATION: Intact Impaired (Comments):   AROM [x]  Le's   PROM []    Strength [x]  Le's grossly 4/5 B   Balance [x]     Posture [] Rounded Shoulders   Sensation []  Neuropathy in feet Coordination [x]      Tone [x]     Edema []    Activity Tolerance []  Improving, but still requires 4 lpm for amb 150' and exs    []      COGNITION/  PERCEPTION: Intact Impaired (Comments):   Orientation [x]     Vision [x]     Hearing [x]     Cognition  [x]       MOBILITY: I Mod I S SBA CGA Min Mod Max Total  NT x2 Comments:   Bed Mobility    Rolling [] [] [] [] [] [] [] [] [] [] []    Supine to Sit [] [] [] [] [] [] [] [] [] [] []    Scooting [] [] [] [] [] [] [] [] [] [] []    Sit to Supine [] [] [] [] [] [] [] [] [] [] []    Transfers    Sit to Stand [] [] [] [x] [] [] [] [] [] [] []    Bed to Chair [] [] [] [x] [] [] [] [] [] [] []    Stand to Sit [] [] [] [x] [] [] [] [] [] [] []     [] [] [] [] [] [] [] [] [] [] []    I=Independent, Mod I=Modified Independent, S=Supervision, SBA=Standby Assistance, CGA=Contact Guard Assistance,   Min=Minimal Assistance, Mod=Moderate Assistance, Max=Maximal Assistance, Total=Total Assistance, NT=Not Tested    GAIT: I Mod I S SBA CGA Min Mod Max Total  NT x2 Comments:   Level of Assistance [] [] [] [x] [] [] [] [] [] [] []    Distance 150 (x 3) feet    DME None    Gait Quality Decreased celi     Weightbearing Status      Stairs      I=Independent, Mod I=Modified Independent, S=Supervision, SBA=Standby Assistance, CGA=Contact Guard Assistance,   Min=Minimal Assistance, Mod=Moderate Assistance, Max=Maximal Assistance, Total=Total Assistance, NT=Not Tested    PLAN:   FREQUENCY AND DURATION: Daily for duration of hospital stay or until stated goals are met, whichever comes first.    THERAPY PROGNOSIS: Good    PROBLEM LIST:   (Skilled intervention is medically necessary to address:)  Decreased Activity Tolerance  Decreased Cognition  Decreased Coordination  Decreased Gait Ability  Decreased Safety Awareness  Decreased Strength  Decreased Transfer Abilities INTERVENTIONS PLANNED:   (Benefits and precautions of physical therapy have been discussed with the patient.)  Therapeutic Activity  Therapeutic Exercise/HEP  Gait Training  Education       TREATMENT:   EVALUATION:     TREATMENT:   Therapeutic Activity (30 Minutes): Therapeutic activity included Scooting, Transfer Training, Ambulation on level ground, Sitting balance , and Standing balance to improve functional Activity tolerance, Balance, Coordination, Mobility, Strength, and ROM.     TREATMENT GRID:   Date:  3/1/23 Date:   Date:     Activity/Exercise Parameters Parameters Parameters   SITTING: ankle DF/PF 10          Hip AB/AD with knee extend 10          Hip flex 10          LAQ 10          Shldr flex 10          Elbow flex/ext 10     Sitting with/without support      Knee flex/ext  12    Hip ab/ad  12    Toes/heel raises  12    Marching in place  12        AFTER TREATMENT PRECAUTIONS: Bed/Chair Locked, Call light within reach, Chair, Needs within reach, and RN notified    INTERDISCIPLINARY COLLABORATION:  RN/ PCT and PT/ PTA    EDUCATION:      TIME IN/OUT:  Time In: 0800  Time Out: 0830  Minutes: [address], PTA

## 2023-03-02 NOTE — DISCHARGE SUMMARY
Hospitalist Discharge Summary   Admit Date:  2023  8:54 PM   DC Note date: 3/2/2023  Name:  Gudelia Hyde   Age:  67 y.o. Sex:  male  :  1950   MRN:  [id number]   Room:  [facility]  PCP:  Hudson Laureano MD    Presenting Complaint: Shortness of Breath     Initial Admission Diagnosis: Wheezing [R06.2]  Hypoxia [R09.02]  Atrial fibrillation with rapid ventricular response (HCC) [I48.91]  Acute asthma exacerbation [J45.901]     Problem List for this Hospitalization (present on admission):    Principal Problem:    Acute respiratory failure with hypoxia ([address]  Active Problems:    Acute asthma exacerbation    Hyponatremia    Atrial fibrillation with rapid ventricular response (HCC)    Mild bibasilar atelectasis    Hypercoagulable state due to paroxysmal atrial fibrillation (HCC)    Class 1 obesity due to excess calories with serious comorbidity and body mass index (BMI) of 33.0 to 33.9 in adult  Resolved Problems:    * No resolved hospital problems. *      Hospital Course:  68 yo male history htn, positive HOMERO and shingles presented to the hospital with dyspnea progressively worse over 3 days. Found to be hypoxemic with A-fib RVR. Admitted to ICU with diltiazem drip converted to normal sinus rhythm. Required at 1.4 to 10 L nasal cannula oxygen. Treated for respiratory failure and exacerbation of asthma with systemic steroids aerosolized bronchodilators and patient found to have human metapneumovirus and had significant tracheobronchitis clinically. Oxygenation has improved to the point where nasal cannula oxygen and has been discontinued. Ambulated prior to discharge without oxygen and pulse ox remained above 90%. He is aware he is improved to the point where he can be discharged to home but is not yet clinically well. He had no recurrence of A-fib RVR. Was placed on anticoagulation and diltiazem and may continue both until outpatient follow-up with primary care 1 week.   I have also written to arrange outpatient follow-up with [facility] [place] cardiology to consider outpatient monitoring and make recommendations about continued anticoagulation and/or rate control. He appears stable for discharge home for outpatient follow-up        Disposition: Stable for discharge  Diet: ADULT DIET; Regular  Code Status: Full Code    Follow Ups:   Follow-up Information     Ez Andrews MD Follow up in 1 week(s). Specialty: Family Medicine  Contact information:  [address] [facility]  [phone]             Ez Andrews MD .    Specialty: Family Medicine  Contact information:  [address]  [address] [place] [address]  [phone]             [address] [place] [address]. Schedule an appointment as soon as possible for a   visit. Specialty: Cardiology  Contact information:  [address] Morataya Parker [address] [place] [address]  [phone]                     Time spent in patient discharge and coordination 41 minutes. Follow up labs/diagnostics (ultimately defer to outpatient provider):  Recommend follow-up BMP and CBC after off steroids a few weeks    Plan was discussed with patient and staff. All questions answered. Patient was stable at time of discharge. Instructions given to call a physician or return if any concerns.     Current Discharge Medication List        START taking these medications    Details   albuterol (PROVENTIL) (2.5 MG/3ML) 0.083% nebulizer solution Take 3 mLs by nebulization every 6 hours while awake  Qty: 120 each, Refills: 0      apixaban (ELIQUIS) 5 MG TABS tablet Take 1 tablet by mouth 2 times daily  Qty: 60 tablet, Refills: 0      dilTIAZem (CARDIZEM CD) 240 MG extended release capsule Take 1 capsule by mouth daily  Qty: 30 capsule, Refills: 0      predniSONE (DELTASONE) 10 MG tablet Take 3 daily for 2 days then 2 daily for 2 days and then 1 daily for 2 days  Qty: 12 tablet, Refills: 0           CONTINUE these medications which have NOT CHANGED Details   lisinopril (PRINIVIL;ZESTRIL) 10 MG tablet Take 1 tablet by mouth daily  Qty: 90 tablet, Refills: 3           STOP taking these medications       tadalafil (CIALIS) 5 MG tablet Comments:   Reason for Stopping:               Some medications may have been reported old/obsolete and marked \"stop taking\" by the system; in reality pt was already off these meds; defer to outpatient or prescribing providers. Procedures done this admission:  * No surgery found *    Consults this admission:  IP CONSULT TO CARDIOLOGY  IP CONSULT TO RESPIRATORY CARE    Echocardiogram results:  02/26/23    TRANSTHORACIC ECHOCARDIOGRAM (TTE) COMPLETE (CONTRAST/BUBBLE/3D PRN) 02/27/2023 12:01 PM, 02/27/2023 12:00 AM (Final)    Interpretation Summary    Left Ventricle: Low normal left ventricular systolic function with a visually estimated EF of 50 - 55%. Left ventricle size is normal. Normal wall thickness. Unable to assess wall motion. Normal diastolic function. Left Atrium: Left atrium is mildly dilated. Technical qualifiers: Procedure performed with the patient in a supine position, color flow Doppler was performed and pulse wave and/or continuous wave Doppler was performed. Contrast used: Definity. Signed by: Hannah Rivera MD on 2/27/2023 12:01 PM, Signed by: Unknown Provider Result on 2/27/2023 12:00 AM      Diagnostic Imaging/Tests:   CTA CHEST W WO CONTRAST PE Eval    Result Date: 2/26/2023  1. No evidence of aortic dissection or acute aortic syndrome. 2. No evidence of pulmonary embolism, accounting for mild limitation due to respiratory motion. 3. Borderline ascending aortic aneurysm measuring 3.9 cm in diameter. 4. Impacted distal bronchi in the right lower lobe. This may represent uncleared  secretions or evidence of aspiration. 5. Right hilar lymphadenopathy, nonspecific. Neoplastic involvement cannot be entirely excluded.   Namrata Mcmullen M.D. 2/26/2023 11:55:00 PM    XR CHEST PORTABLE    Result Date: 2/27/2023  -Streaky bibasilar opacities, likely reflecting atelectasis, though infection/aspiration are also considerations in the appropriate context. XR CHEST PORTABLE    Result Date: 2/26/2023  Impression: 1. Linear streaky density left lung base consistent with scar or atelectasis. No  finding to account for shortness of breath. Slot # [id number] Thank you for the referral of this patient. This exam was interpreted by an American Board of Radiology certified Diversified radiologist with subspecialty fellowship in [place] [address]. If there are any questions regarding this exam please feel free to contact a body radiologist directly at [phone].  Or you  can reach me personally at [email or web address].   Leocadia Spatz, M.D., [facility] 2/26/2023 8:50:00 PM       Labs: Results:       BMP, Mg, Phos Recent Labs     02/28/23 0327 03/01/23 0316 03/02/23 0315    136 135   K 3.8 4.4 4.3    106 105   CO2 20* 24 26   ANIONGAP 8 6 4   BUN 19 19 22   CREATININE 0.98 0.89 0.96   LABGLOM >60 >60 >60   CALCIUM 7.6* 8.1* 8.1*   GLUCOSE 146* 142* 124*      CBC Recent Labs     02/28/23 0327 03/01/23 0316 03/02/23 0315   WBC 11.2* 12.2* 12.4*   RBC 4.34 4.49 4.65   HGB 13.3* 13.8 14.3   HCT 40.0* 41.5 42.0   MCV 92.2 92.4 90.3   MCH 30.6 30.7 30.8   MCHC 33.3 33.3 34.0   RDW 13.1 13.3 13.2   * 169 186   MPV 10.9 10.8 10.8   NRBC 0.00 0.00 0.00   SEGS 83* 86* 78   LYMPHOPCT 7* 7* 11*   EOSRELPCT 0* 0* 0*   MONOPCT 9 7 9   BASOPCT 0 0 1   IMMGRAN 0 0 2   SEGSABS 9.3* 10.4* 9.7*   LYMPHSABS 0.8 0.9 1.3   EOSABS 0.0 0.0 0.0   MONOSABS 1.0 0.8 1.1   BASOSABS 0.0 0.0 0.1   ABSIMMGRAN 0.0 0.0 0.2      LFT Recent Labs     02/28/23  0327 03/01/23 0316 03/02/23 0315   BILITOT 0.4 0.5 0.6   ALKPHOS 44* 44* 47*   AST 38* 43* 42*   ALT 39 48 53   PROT 5.8* 6.1* 5.9*   LABALBU 2.6* 2.7* 2.7*   GLOB 3.2 3.4 3.2      Cardiac  Lab Results   Component Value Date/Time    NTPROBNP 286 02/26/2023 08:23 PM      Coags No results found for: PROTIME, INR, APTT   A1c No results found for: LABA1C, EAG   Lipids Lab Results   Component Value Date/Time    CHOL 179 03/07/2022 09:24 AM    LDLCALC 123 03/07/2022 09:24 AM    LABVLDL 17 02/18/2020 10:26 AM    HDL 47 03/07/2022 09:24 AM    TRIG 48 03/07/2022 09:24 AM      Thyroid  No results found for: Rico Vicente     Most Recent UA Lab Results   Component Value Date/Time    COLORU Yellow 03/07/2022 09:24 AM    SPECGRAV 1.021 03/07/2022 09:24 AM    PROTEINU Negative 03/07/2022 09:24 AM    GLUCOSEU Negative 03/07/2022 09:24 AM    KETUA Negative 03/07/2022 09:24 AM    BILIRUBINUR Negative 03/07/2022 09:24 AM    BLOODU Negative 03/07/2022 09:24 AM    UROBILINOGEN 0.2 03/07/2022 09:24 AM    NITRU Negative 03/07/2022 09:24 AM    LEUKOCYTESUR Negative 03/07/2022 09:24 AM        Recent Labs     02/26/23 2109 02/26/23 2014   CULTURE NO GROWTH 4 DAYS NO GROWTH 4 DAYS       All Labs from Last 24 Hrs:  Recent Results (from the past 24 hour(s))   Comprehensive Metabolic Panel w/ Reflex to MG    Collection Time: 03/02/23  3:15 AM   Result Value Ref Range    Sodium 135 133 - 143 mmol/L    Potassium 4.3 3.5 - 5.1 mmol/L    Chloride 105 101 - 110 mmol/L    CO2 26 21 - 32 mmol/L    Anion Gap 4 2 - 11 mmol/L    Glucose 124 (H) 65 - 100 mg/dL    BUN 22 8 - 23 MG/DL    Creatinine 0.96 0.8 - 1.5 MG/DL    Est, Glom Filt Rate >60 >60 ml/min/1.73m2    Calcium 8.1 (L) 8.3 - 10.4 MG/DL    Total Bilirubin 0.6 0.2 - 1.1 MG/DL    ALT 53 12 - 65 U/L    AST 42 (H) 15 - 37 U/L    Alk Phosphatase 47 (L) 50 - 136 U/L    Total Protein 5.9 (L) 6.3 - 8.2 g/dL    Albumin 2.7 (L) 3.2 - 4.6 g/dL    Globulin 3.2 2.8 - 4.5 g/dL    Albumin/Globulin Ratio 0.8 0.4 - 1.6     CBC with Auto Differential    Collection Time: 03/02/23  3:15 AM   Result Value Ref Range    WBC 12.4 (H) 4.3 - 11.1 K/uL    RBC 4.65 4.23 - 5.6 M/uL    Hemoglobin 14.3 13.6 - 17.2 g/dL    Hematocrit 42.0 41.1 - 50.3 %    MCV 90.3 82.0 - 102.0 FL    MCH 30.8 26.1 - 32.9 PG    MCHC 34.0 31.4 - 35.0 g/dL    RDW 13.2 11.9 - 14.6 %    Platelets 221 807 - 607 K/uL    MPV 10.8 9.4 - 12.3 FL    nRBC 0.00 0.0 - 0.2 K/uL    Differential Type AUTOMATED      Seg Neutrophils 78 43 - 78 %    Lymphocytes 11 (L) 13 - 44 %    Monocytes 9 4.0 - 12.0 %    Eosinophils % 0 (L) 0.5 - 7.8 %    Basophils 1 0.0 - 2.0 %    Immature Granulocytes 2 0.0 - 5.0 %    Segs Absolute 9.7 (H) 1.7 - 8.2 K/UL    Absolute Lymph # 1.3 0.5 - 4.6 K/UL    Absolute Mono # 1.1 0.1 - 1.3 K/UL    Absolute Eos # 0.0 0.0 - 0.8 K/UL    Basophils Absolute 0.1 0.0 - 0.2 K/UL    Absolute Immature Granulocyte 0.2 0.0 - 0.5 K/UL       No Known Allergies  Immunization History   Administered Date(s) Administered    COVID-19, PFIZER PURPLE top, DILUTE for use, (age 15 y+), 30mcg/0.3mL 01/21/2021, 02/26/2021    Influenza Virus Vaccine 10/01/2019    Influenza, FLUZONE (age 72 y+), High Dose, 0.7mL 10/22/2020, 11/04/2022    Pneumococcal Conjugate 13-valent ([id number]) 12/04/2015    Pneumococcal Polysaccharide ([id number]) 12/06/2016    Td vaccine (adult) 01/01/2010    Tdap (Boostrix, Adacel) 02/19/2020    Zoster Live (Zostavax) 03/04/2017    Zoster Recombinant (Shingrix) 12/30/2018, 03/17/2019       Recent Vital Data:  Patient Vitals for the past 24 hrs:   Temp Pulse Resp BP SpO2   03/02/23 1100 98.3 °F (36.8 °C) -- -- -- --   03/02/23 0953 -- -- -- -- 91 %   03/02/23 0821 -- 71 12 -- 93 %   03/02/23 0706 98 °F (36.7 °C) -- -- -- --   03/02/23 0630 -- 63 15 -- 93 %   03/02/23 0600 -- 68 23 (!) 155/89 92 %   03/02/23 0530 -- 74 16 -- 92 %   03/02/23 0500 98.4 °F (36.9 °C) 58 18 (!) 143/83 91 %   03/02/23 0430 -- 60 18 -- 91 %   03/02/23 0400 -- 60 17 (!) 143/87 91 %   03/02/23 0330 -- 68 23 (!) 146/86 93 %   03/02/23 0300 -- 75 17 (!) 171/137 93 %   03/02/23 0230 -- 73 20 -- 92 %   03/02/23 0214 -- 69 12 -- 94 %   03/02/23 0200 -- 66 22 (!) 151/88 91 %   03/02/23 0130 -- 73 22 -- 93 %   03/02/23 0100 97.9 °F (36.6 °C) 66 21 (!) 146/88 93 %   03/02/23 0030 -- 67 20 -- 92 %   03/02/23 0000 -- 66 17 (!) 140/83 93 %   03/01/23 2330 -- 64 19 -- 92 %   03/01/23 2300 -- 83 22 (!) 159/87 93 %   03/01/23 2230 -- 80 21 -- 91 %   03/01/23 2200 -- 70 19 (!) 147/91 92 %   03/01/23 2130 -- 74 23 -- 92 %   03/01/23 2100 -- 79 16 136/62 91 %   03/01/23 2030 -- 85 21 -- 90 %   03/01/23 2000 -- 91 17 (!) 165/95 91 %   03/01/23 1945 98.1 °F (36.7 °C) 86 17 (!) 149/84 92 %   03/01/23 1930 -- 81 22 -- 92 %   03/01/23 1915 -- 73 12 -- 94 %   03/01/23 1900 -- 72 23 (!) 141/88 92 %   03/01/23 1844 -- -- -- -- 92 %   03/01/23 1800 -- 72 22 132/69 93 %   03/01/23 1700 -- 75 24 (!) 142/81 93 %   03/01/23 1600 -- 73 16 136/84 93 %   03/01/23 1500 98.2 °F (36.8 °C) 88 16 118/77 92 %   03/01/23 1403 -- 77 15 -- 95 %   03/01/23 1400 -- 83 18 (!) 140/78 93 %   03/01/23 1300 -- 71 22 124/73 95 %       Oxygen Therapy  SpO2: 91 %  Pulse Oximetry Type: Continuous  Pulse via Oximetry: 62 beats per minute  SPO2 High Alarm Limit: 100  SPO2 Low Alarm Limit POX: 90  Pulse Oximeter Device Mode: Continuous  Pulse Oximeter Device Location: Right, Finger  O2 Device: None (Room air)  FiO2 : 21 %  O2 Flow Rate (L/min): 0 L/min    Estimated body mass index is 32.43 kg/m² as calculated from the following:    Height as of this encounter: 5' 10\" (1.778 m). Weight as of this encounter: 226 lb (102.5 kg). Intake/Output Summary (Last 24 hours) at 3/2/2023 1210  Last data filed at 3/2/2023 1147  Gross per 24 hour   Intake 720 ml   Output 4225 ml   Net -3505 ml         Physical Exam:    General:    Well nourished. No overt distress  Head:  Normocephalic, atraumatic  Eyes:  Sclerae appear normal.  Pupils equally round. HENT:  Nares appear normal, no drainage. Moist mucous membranes  Neck:  No restricted ROM. Trachea midline  CV:   RRR. No m/r/g. No JVD  Lungs:   Scattered rhonchi and bronchitic sounds bilateral.  No gross pulmonary rales.   No significant wheeze at rest.  Abdomen:   Soft, nontender, nondistended. Extremities: Warm and dry. No cyanosis or clubbing. No unilateral lower extremity edema. Skin:     No rashes. Normal coloration  Neuro:  CN II-XII grossly intact. Psych:  Normal mood and affect. Signed:  Eugene Tee DO    Part of this note may have been written by using a voice dictation software. The note has been proof read but may still contain some grammatical/other typographical errors.

## 2023-03-02 NOTE — PROGRESS NOTES
03/02/23 0847   Resting (Room Air)   SpO2 91   HR 80   Resting (On O2)   SpO2   (NA)   During Walk (Room Air)   SpO2 92   HR 84   Walk/Assistance Device Ambulation   Rate of Dyspnea 0   During Walk (On O2)   SpO2   (NA)   After Walk   SpO2 92   HR 85   Does the Patient Qualify for Home O2 No   Does the Patient Need Portable Oxygen Tanks No Care Transition Team Assessment  In the case of an emergency, pt's legal NOK is Vandana Dos Santos  [phone]    RNCM met with pt's daughters and obtained the information used in this assessment. Pt verified accuracy of facesheet. Pt lives in a Motor home with S/O. Prior to current hospitalization, pt was completely independent in ADLS/IADLS. Pt drives and is able to attend necessary MD appointments. Pt has no financial concerns. Pt has a good support system. Pt denies any hx of substance use and denies any dx of mh. Pt will be transitioned to comfort care.    Information Source: daughters  Orientation : Unable to Assess  Information Given By: (chart marcelina and sam)  Informant's Name: Vandana and Susie  Who is responsible for making decisions for patient? : Patient         Elopement Risk  Legal Hold: No  Ambulatory or Self Mobile in Wheelchair: No-Not an Elopement Risk  Elopement Risk: Not at Risk for Elopement    Interdisciplinary Discharge Planning  Does Admitting Nurse Feel This Could be a Complex Discharge?: No  Primary Care Physician: Dr. Davis  Lives with - Patient's Self Care Capacity: Significant Other  Patient or legal guardian wants to designate a caregiver (see row info): No  Support Systems: Family Member(s)  Housing / Facility: Motor Home  Do You Take your Prescribed Medications Regularly: Yes  Mobility Issues: No  Prior Services: None    Discharge Preparedness  What is your plan after discharge?: Uncertain - pending medical team collaboration  What are your discharge supports?: Child, Partner  Prior Functional Level: Ambulatory, Drives Self, Independent with Activities of Daily Living, Independent with Medication Management  Difficulity with ADLs: None  Difficulity with IADLs: None    Functional Assesment  Prior Functional Level: Ambulatory, Drives Self, Independent with Activities of Daily Living, Independent with Medication Management    Finances  Prescription Coverage: Yes    Vision  / Hearing Impairment  Vision Impairment : Yes  Right Eye Vision: Impaired, Wears Glasses  Left Eye Vision: Impaired, Wears Glasses  Hearing Impairment : No         Advance Directive  Advance Directive?: DPOA for Health Care    Domestic Abuse  Have you ever been the victim of abuse or violence?: No  Physical Abuse or Sexual Abuse: No  Verbal Abuse or Emotional Abuse: No  Possible Abuse Reported to:: Not Applicable    Psychological Assessment  History of Substance Abuse: None  History of Psychiatric Problems: No

## 2023-03-03 ENCOUNTER — CARE COORDINATION (OUTPATIENT)
Dept: CARE COORDINATION | Facility: CLINIC | Age: 73
End: 2023-03-03

## 2023-03-03 LAB
BACTERIA SPEC CULT: NORMAL
BACTERIA SPEC CULT: NORMAL
SERVICE CMNT-IMP: NORMAL
SERVICE CMNT-IMP: NORMAL

## 2023-03-03 NOTE — CARE COORDINATION
[address] Care Transitions Initial Follow Up Call    Call within 2 business days of discharge: Yes    Patient Current Location:  Home: [address] [place] [phone]    LPN Care Coordinator contacted the patient by telephone to perform post hospital discharge assessment. Verified name and  with patient as identifiers. Provided introduction to self, and explanation of the LPN Care Coordinator role. Patient: Teja Mcduffie Patient : 1950   MRN: [id number]  Reason for Admission: Acute respiratory failure with hypoxia  Discharge Date: 3/2/23 RARS: Readmission Risk Score: 8.7      Last Discharge [address]       Date Complaint Diagnosis Description Type Department Provider    23 Shortness of Breath Hypoxia . .. ED to Hosp-Admission (Discharged) (ADMITTED) [id number] Obi Mock DO; Haresh Cunningham. .. Was this an external facility discharge? No Discharge Facility: [facility]    Challenges to be reviewed by the provider   Additional needs identified to be addressed with provider: No  none               Method of communication with provider: none. LPN Care Coordinator reviewed discharge instructions, medical action plan, and red flags with patient who verbalized understanding. The patient was given an opportunity to ask questions and does not have any further questions or concerns at this time. Were discharge instructions available to patient? Yes. Reviewed appropriate site of care based on symptoms and resources available to patient including: PCP  Specialist  When to call [address] The patient agrees to contact the PCP office for questions related to their healthcare. Advance Care Planning:   Does patient have an Advance Directive: reviewed and current. Review of discharge medication was performed with patient, who verbalizes understanding of administration of home medications.  Medications reviewed, 1111F entered: N/A    Was patient discharged with a pulse oximeter? no    Non-face-to-face services provided:  Obtained and reviewed discharge summary and/or continuity of care documents    Offered patient enrollment in the Remote Patient Monitoring (RPM) program for in-home monitoring: NA.    Care Transitions 24 Hour Call    Schedule Follow Up Appointment with PCP: Completed  Do you have a copy of your discharge instructions?: Yes  Do you have all of your prescriptions and are they filled?: Yes  Have you been contacted by a [facility] Pharmacist?: No  Have you scheduled your follow up appointment?: Yes  How are you going to get to your appointment?: Car - drive self  Do you have support at home?: Partner/Spouse/SO  Do you feel like you have everything you need to keep you well at home?: Yes  Care Transitions Interventions  No Identified Needs         Discussed follow-up appointments. If no appointment was previously scheduled, appointment scheduling offered: Yes. Is follow up appointment scheduled within 7 days of discharge? Yes. Follow Up  Future Appointments   Date Time Provider Danae Ratliff   3/8/2023  4:00 PM DO MOSES Rao [facility]   3/10/2023  8:30 AM Tonia Beach MD JÚNIOR [facility]   3/13/2023  8:00 AM Tonia Beach MD [facility] Care Coordinator provided contact information. Plan for follow up call in 7 to 10 days  based on severity of symptoms and risk factors.   Plan for next call: follow-up appointment-assess attendance and compliance with plan of care    Alfredo Valenzuela LPN

## 2023-03-08 ENCOUNTER — OFFICE VISIT (OUTPATIENT)
Dept: CARDIOLOGY CLINIC | Age: 73
End: 2023-03-08
Payer: MEDICARE

## 2023-03-08 VITALS
HEIGHT: 70 IN | BODY MASS INDEX: 29.49 KG/M2 | DIASTOLIC BLOOD PRESSURE: 72 MMHG | HEART RATE: 70 BPM | SYSTOLIC BLOOD PRESSURE: 110 MMHG | WEIGHT: 206 LBS | OXYGEN SATURATION: 94 %

## 2023-03-08 DIAGNOSIS — I10 ESSENTIAL HYPERTENSION: Primary | ICD-10-CM

## 2023-03-08 DIAGNOSIS — E87.1 HYPONATREMIA: ICD-10-CM

## 2023-03-08 DIAGNOSIS — E66.09 CLASS 1 OBESITY DUE TO EXCESS CALORIES WITH SERIOUS COMORBIDITY AND BODY MASS INDEX (BMI) OF 33.0 TO 33.9 IN ADULT: ICD-10-CM

## 2023-03-08 DIAGNOSIS — I48.0 PAROXYSMAL ATRIAL FIBRILLATION (HCC): ICD-10-CM

## 2023-03-08 PROCEDURE — G8484 FLU IMMUNIZE NO ADMIN: HCPCS | Performed by: INTERNAL MEDICINE

## 2023-03-08 PROCEDURE — 1123F ACP DISCUSS/DSCN MKR DOCD: CPT | Performed by: INTERNAL MEDICINE

## 2023-03-08 PROCEDURE — 93000 ELECTROCARDIOGRAM COMPLETE: CPT | Performed by: INTERNAL MEDICINE

## 2023-03-08 PROCEDURE — 1036F TOBACCO NON-USER: CPT | Performed by: INTERNAL MEDICINE

## 2023-03-08 PROCEDURE — 99214 OFFICE O/P EST MOD 30 MIN: CPT | Performed by: INTERNAL MEDICINE

## 2023-03-08 PROCEDURE — 3017F COLORECTAL CA SCREEN DOC REV: CPT | Performed by: INTERNAL MEDICINE

## 2023-03-08 PROCEDURE — G8417 CALC BMI ABV UP PARAM F/U: HCPCS | Performed by: INTERNAL MEDICINE

## 2023-03-08 PROCEDURE — 1111F DSCHRG MED/CURRENT MED MERGE: CPT | Performed by: INTERNAL MEDICINE

## 2023-03-08 PROCEDURE — 3074F SYST BP LT 130 MM HG: CPT | Performed by: INTERNAL MEDICINE

## 2023-03-08 PROCEDURE — G8427 DOCREV CUR MEDS BY ELIG CLIN: HCPCS | Performed by: INTERNAL MEDICINE

## 2023-03-08 PROCEDURE — 3078F DIAST BP <80 MM HG: CPT | Performed by: INTERNAL MEDICINE

## 2023-03-08 RX ORDER — DILTIAZEM HYDROCHLORIDE 240 MG/1
240 CAPSULE, COATED, EXTENDED RELEASE ORAL DAILY
Qty: 30 CAPSULE | Refills: 11 | Status: SHIPPED | OUTPATIENT
Start: 2023-03-08

## 2023-03-08 ASSESSMENT — ENCOUNTER SYMPTOMS
GASTROINTESTINAL NEGATIVE: 1
NAUSEA: 0
SHORTNESS OF BREATH: 0
COUGH: 1
VOMITING: 0
ABDOMINAL PAIN: 0
HEMATOCHEZIA: 0

## 2023-03-08 NOTE — PROGRESS NOTES
[address] [phone] [facility], [address]  Patient:  Александр Rogers  1950      SUBJECTIVE:  Александр Rogers is a  67 y.o. male seen for a follow up visit regarding the following:     Chief Complaint   Patient presents with    Follow-Up from Hospital     hypoxia    Atrial Fibrillation     CC: Paroxysmal atrial fibrillation    HPI:   67 y.o. male with a history of paroxysmal atrial fibrillation, HTN who is here for follow-up after admission to the hospital in late February 2023. Patient was last seen in hospital on 2/26/23 , since then reports that he has been doing well. Reports his breathing has improved since leaving the hospital.  He continues to have some shortness of breath which is mild as well as some cough. No chest pain, racing heart, palpitations, dizziness, lightheadedness, abdominal pain, nausea, vomiting, leg swelling, GI or  bleeding. Taking his medications as directed without missing doses. Upcoming follow-up with PCP in the next couple of weeks. No other complaints at this time. Able to obtain his medications without difficulty. Cardiovascular Testing:  - Echo 2/27/23: LVEF 50-55 %, RV normal, Valves: Trace MR, trace TR. Past medical history, past surgical history, family history, social history, and medications were all reviewed with the patient today and updated as necessary.      Patient Active Problem List    Diagnosis Date Noted    Hypercoagulable state due to paroxysmal atrial fibrillation ([facility] [address] 02/28/2023     Priority: Medium    Class 1 obesity due to excess calories with serious comorbidity and body mass index (BMI) of 33.0 to 33.9 in adult 02/28/2023     Priority: Medium    Acute asthma exacerbation 02/27/2023     Priority: Medium    Hyponatremia 02/27/2023     Priority: Medium    Acute respiratory failure with hypoxia ([address] 02/27/2023     Priority: Medium    Atrial fibrillation with rapid ventricular response ([address] 2023     Priority: Medium    Mild bibasilar atelectasis 2023     Priority: Medium    Cervicalgia 10/24/2016    Positive HOMERO (antinuclear antibody) 10/24/2016    Pain in joint, multiple sites 10/24/2016       Family History   Problem Relation Age of Onset    Heart Disease Paternal Grandfather     Asthma Paternal Grandfather     No Known Problems Brother     Hypertension Father     High Cholesterol Father     Prostate Cancer Father     Cancer Father 80        bladder & stomach    Osteoarthritis Mother     Heart Disease Father        Social History     Tobacco Use    Smoking status: Former     Types: Cigarettes     Quit date: 1980     Years since quittin.2    Smokeless tobacco: Never   Substance Use Topics    Alcohol use: Yes     Alcohol/week: 5.0 standard drinks     Review of Systems   Constitutional: Negative. Negative for chills and fever. Cardiovascular: Negative. Negative for chest pain, dyspnea on exertion, irregular heartbeat, leg swelling, palpitations and syncope. Respiratory:  Positive for cough. Negative for shortness of breath. Hematologic/Lymphatic: Negative. Negative for bleeding problem. Does not bruise/bleed easily. Gastrointestinal: Negative. Negative for abdominal pain, hematochezia, nausea and vomiting. Genitourinary:  Negative for hematuria. Neurological: Negative. PHYSICAL EXAM:  /72   Pulse 70   Ht 5' 10\" (1.778 m)   Wt 206 lb (93.4 kg) Comment: with shoes  SpO2 94%   BMI 29.56 kg/m²     Physical Exam  Vitals reviewed. Constitutional:       General: He is not in acute distress. Appearance: He is not toxic-appearing. HENT:      Head: Normocephalic and atraumatic. Cardiovascular:      Rate and Rhythm: Normal rate and regular rhythm. Heart sounds: Normal heart sounds. No murmur heard. No friction rub. No gallop. Pulmonary:      Effort: Pulmonary effort is normal. No respiratory distress. Breath sounds: No stridor.  No wheezing, rhonchi or rales. Musculoskeletal:      Right lower leg: No edema. Left lower leg: No edema. Skin:     General: Skin is warm and dry. Neurological:      General: No focal deficit present. Mental Status: He is alert and oriented to person, place, and time. Psychiatric:         Mood and Affect: Mood normal.         Judgment: Judgment normal.       Medical problems, medical history, and test results were reviewed with the patient today.      Recent Results (from the past 168 hour(s))   Comprehensive Metabolic Panel w/ Reflex to MG    Collection Time: 03/02/23  3:15 AM   Result Value Ref Range    Sodium 135 133 - 143 mmol/L    Potassium 4.3 3.5 - 5.1 mmol/L    Chloride 105 101 - 110 mmol/L    CO2 26 21 - 32 mmol/L    Anion Gap 4 2 - 11 mmol/L    Glucose 124 (H) 65 - 100 mg/dL    BUN 22 8 - 23 MG/DL    Creatinine 0.96 0.8 - 1.5 MG/DL    Est, Glom Filt Rate >60 >60 ml/min/1.73m2    Calcium 8.1 (L) 8.3 - 10.4 MG/DL    Total Bilirubin 0.6 0.2 - 1.1 MG/DL    ALT 53 12 - 65 U/L    AST 42 (H) 15 - 37 U/L    Alk Phosphatase 47 (L) 50 - 136 U/L    Total Protein 5.9 (L) 6.3 - 8.2 g/dL    Albumin 2.7 (L) 3.2 - 4.6 g/dL    Globulin 3.2 2.8 - 4.5 g/dL    Albumin/Globulin Ratio 0.8 0.4 - 1.6     CBC with Auto Differential    Collection Time: 03/02/23  3:15 AM   Result Value Ref Range    WBC 12.4 (H) 4.3 - 11.1 K/uL    RBC 4.65 4.23 - 5.6 M/uL    Hemoglobin 14.3 13.6 - 17.2 g/dL    Hematocrit 42.0 41.1 - 50.3 %    MCV 90.3 82.0 - 102.0 FL    MCH 30.8 26.1 - 32.9 PG    MCHC 34.0 31.4 - 35.0 g/dL    RDW 13.2 11.9 - 14.6 %    Platelets 845 035 - 470 K/uL    MPV 10.8 9.4 - 12.3 FL    nRBC 0.00 0.0 - 0.2 K/uL    Differential Type AUTOMATED      Seg Neutrophils 78 43 - 78 %    Lymphocytes 11 (L) 13 - 44 %    Monocytes 9 4.0 - 12.0 %    Eosinophils % 0 (L) 0.5 - 7.8 %    Basophils 1 0.0 - 2.0 %    Immature Granulocytes 2 0.0 - 5.0 %    Segs Absolute 9.7 (H) 1.7 - 8.2 K/UL    Absolute Lymph # 1.3 0.5 - 4.6 K/UL Absolute Mono # 1.1 0.1 - 1.3 K/UL    Absolute Eos # 0.0 0.0 - 0.8 K/UL    Basophils Absolute 0.1 0.0 - 0.2 K/UL    Absolute Immature Granulocyte 0.2 0.0 - 0.5 K/UL         Current Outpatient Medications:     albuterol (PROVENTIL) (2.5 MG/3ML) 0.083% nebulizer solution, Take 3 mLs by nebulization every 6 hours while awake, Disp: 120 each, Rfl: 0    apixaban (ELIQUIS) 5 MG TABS tablet, Take 1 tablet by mouth 2 times daily, Disp: 60 tablet, Rfl: 0    dilTIAZem (CARDIZEM CD) 240 MG extended release capsule, Take 1 capsule by mouth daily, Disp: 30 capsule, Rfl: 0    predniSONE (DELTASONE) 10 MG tablet, Take 3 daily for 2 days then 2 daily for 2 days and then 1 daily for 2 days, Disp: 12 tablet, Rfl: 0    lisinopril (PRINIVIL;ZESTRIL) 10 MG tablet, Take 1 tablet by mouth daily, Disp: 90 tablet, Rfl: 3     ASSESSMENT/PLAN:    Cardiovascular Testing:  - Echo 2/27/23: LVEF 50-55 %, RV normal, Valves: Trace MR, trace TR. 1. Paroxysmal atrial fibrillation (HCC)  -[id number] score = 2 (age greater than 72, HTN) discussed indication for systemic anticoagulation with the patient today. They are agreeable to continuing. Discussed risks and benefits of Eliquis.  -Continue diltiazem  - EKG 12 Lead    2. Essential hypertension  -Controlled at this time    3. Hyponatremia  -Sodium levels seem to have normalized on labs check with PCP 3/2/23. Labs personally reviewed. Hemoglobin 14.3.    4. Class 1 obesity due to excess calories with serious comorbidity and body mass index (BMI) of 33.0 to 33.9 in adult  - continue efforts towards dietary changes in an effort to lose weight    Recommend checking lipids while fasting with upcoming PCP visits. We will plan to check lipids at follow-up if not checked in the interim.       Problem List Items Addressed This Visit          Other    Hyponatremia    Class 1 obesity due to excess calories with serious comorbidity and body mass index (BMI) of 33.0 to 33.9 in adult     Other Visit Diagnoses       Essential hypertension    -  Primary    Paroxysmal atrial fibrillation (HCC)        Relevant Orders    EKG 12 Lead (Completed)            Instructed patient go to ER or call 911/EMS should symptoms recur or worsen. Patient has been instructed and agrees to call our office with any issues or other concerns related to their cardiac condition(s) and/or complaint(s). No follow-ups on file.     Mirna Emmanuel DO  3/8/2023 3:59 PM

## 2023-03-10 ENCOUNTER — TELEMEDICINE (OUTPATIENT)
Dept: FAMILY MEDICINE CLINIC | Facility: CLINIC | Age: 73
End: 2023-03-10

## 2023-03-10 DIAGNOSIS — Z09 HOSPITAL DISCHARGE FOLLOW-UP: Primary | ICD-10-CM

## 2023-03-10 ASSESSMENT — ENCOUNTER SYMPTOMS
RESPIRATORY NEGATIVE: 1
GASTROINTESTINAL NEGATIVE: 1
EYES NEGATIVE: 1

## 2023-03-10 NOTE — PROGRESS NOTES
3/10/2023    TELEHEALTH EVALUATION -- Audio/Visual (During NIBUX-50 public health emergency)    HPI:    Virginia Mckenna (:  1950) has requested an audio/video evaluation for the following concern(s):    Admitted on  for hypoxia with respiratory failure related to a respiratory virus. Also found to to have atrial fib. Patient responded to steroids and pulmonary toilet. Atrial fib resolved. Discharged 2 Mar. Feeling much better with no wheezing, SOB or cough. Appetite is good. Sleeping normally. Has AWV in office on Monday. Past Medical History:   Diagnosis Date    Allergy desensitization therapy     receives injections every other week    Backache, unspecified 10/24/2016    Cervicalgia 10/24/2016    H/O complete eye exam Fall     Dr. Magda Flores, [address]    Hypertension     Pain in joint, multiple sites 10/24/2016    Positive HOMERO (antinuclear antibody) 10/24/2016    Work-up done with Rheumatology, negative    Shingles 1990's    hospitalized for 6 days    Toe fracture, left 2019 metatarcel         Review of Systems   Constitutional: Negative. HENT: Negative. Eyes: Negative. Respiratory: Negative. Cardiovascular: Negative. Gastrointestinal: Negative. Genitourinary: Negative. Musculoskeletal: Negative. Neurological: Negative. Prior to Visit Medications    Medication Sig Taking?  Authorizing Provider   apixaban (ELIQUIS) 5 MG TABS tablet Take 1 tablet by mouth 2 times daily  Cindy Minor,    dilTIAZem (CARDIZEM CD) 240 MG extended release capsule Take 1 capsule by mouth daily  Cindy Minor DO   albuterol (PROVENTIL) (2.5 MG/3ML) 0.083% nebulizer solution Take 3 mLs by nebulization every 6 hours while awake  Carolynn Sniff, DO   predniSONE (DELTASONE) 10 MG tablet Take 3 daily for 2 days then 2 daily for 2 days and then 1 daily for 2 days  Carolynn Sniff, DO   lisinopril (PRINIVIL;ZESTRIL) 10 MG tablet Take 1 tablet by mouth daily  Ashley Sandoval Brennan Plasencia MD       Social History     Tobacco Use    Smoking status: Former     Types: Cigarettes     Quit date: 1980     Years since quittin.2    Smokeless tobacco: Never   Substance Use Topics    Alcohol use: Yes     Alcohol/week: 5.0 standard drinks    Drug use: Never            PHYSICAL EXAMINATION:  [ INSTRUCTIONS:  \"[x]\" Indicates a positive item  \"[]\" Indicates a negative item  -- DELETE ALL ITEMS NOT EXAMINED]  Vital Signs: (As obtained by patient/caregiver or practitioner observation)    Blood pressure-  Heart rate-    Respiratory rate-    Temperature-  Pulse oximetry-     Constitutional: [x] Appears well-developed and well-nourished [] No apparent distress      [] Abnormal-   Mental status  [x] Alert and awake  [] Oriented to person/place/time []Able to follow commands      Eyes:  EOM    []  Normal  [] Abnormal-  Sclera  []  Normal  [] Abnormal -         Discharge []  None visible  [] Abnormal -    HENT:   [] Normocephalic, atraumatic. [] Abnormal   [] Mouth/Throat: Mucous membranes are moist.     External Ears [] Normal  [] Abnormal-     Neck: [] No visualized mass     Pulmonary/Chest: [x] Respiratory effort normal.  [] No visualized signs of difficulty breathing or respiratory distress        [] Abnormal-      Musculoskeletal:   [] Normal gait with no signs of ataxia         [] Normal range of motion of neck        [] Abnormal-       Neurological:        [x] No Facial Asymmetry (Cranial nerve 7 motor function) (limited exam to video visit)          [] No gaze palsy        [] Abnormal-         Skin:        [] No significant exanthematous lesions or discoloration noted on facial skin         [] Abnormal-            Psychiatric:       [x] Normal Affect [] No Hallucinations        [] Abnormal-     Other pertinent observable physical exam findings-     ASSESSMENT/PLAN:  1. Hospital discharge follow-up  Discussed history and medications with patient. Continue current measures.  Follow up if side effects occur or if new symptoms develop. Follow up for AWV on Monday as planned. Return in about 3 days (around 3/13/2023), or if symptoms worsen or fail to improve. Faye Solis, was evaluated through a synchronous (real-time) audio-video encounter. The patient (or guardian if applicable) is aware that this is a billable service, which includes applicable co-pays. This Virtual Visit was conducted with patient's (and/or legal guardian's) consent. The visit was conducted pursuant to the emergency declaration under the [facility], [address] [place] [address] [facility] authority and the [employer] General Act. Patient identification was verified, and a caregiver was present when appropriate. The patient was located at Home: [address] [place] [phone]  Provider was located at [facility] [phone] [address],  [address]        Total time spent on this encounter: Not billed by time    --Hipolito Dorado MD on 3/10/2023 at 8:41 AM    An electronic signature was used to authenticate this note.

## 2023-03-12 SDOH — HEALTH STABILITY: PHYSICAL HEALTH: ON AVERAGE, HOW MANY MINUTES DO YOU ENGAGE IN EXERCISE AT THIS LEVEL?: 50 MIN

## 2023-03-12 SDOH — ECONOMIC STABILITY: INCOME INSECURITY: HOW HARD IS IT FOR YOU TO PAY FOR THE VERY BASICS LIKE FOOD, HOUSING, MEDICAL CARE, AND HEATING?: NOT HARD AT ALL

## 2023-03-12 SDOH — ECONOMIC STABILITY: FOOD INSECURITY: WITHIN THE PAST 12 MONTHS, YOU WORRIED THAT YOUR FOOD WOULD RUN OUT BEFORE YOU GOT MONEY TO BUY MORE.: NEVER TRUE

## 2023-03-12 SDOH — HEALTH STABILITY: PHYSICAL HEALTH: ON AVERAGE, HOW MANY DAYS PER WEEK DO YOU ENGAGE IN MODERATE TO STRENUOUS EXERCISE (LIKE A BRISK WALK)?: 7 DAYS

## 2023-03-12 SDOH — ECONOMIC STABILITY: FOOD INSECURITY: WITHIN THE PAST 12 MONTHS, THE FOOD YOU BOUGHT JUST DIDN'T LAST AND YOU DIDN'T HAVE MONEY TO GET MORE.: NEVER TRUE

## 2023-03-12 SDOH — ECONOMIC STABILITY: TRANSPORTATION INSECURITY
IN THE PAST 12 MONTHS, HAS LACK OF TRANSPORTATION KEPT YOU FROM MEETINGS, WORK, OR FROM GETTING THINGS NEEDED FOR DAILY LIVING?: NO

## 2023-03-12 SDOH — ECONOMIC STABILITY: HOUSING INSECURITY
IN THE LAST 12 MONTHS, WAS THERE A TIME WHEN YOU DID NOT HAVE A STEADY PLACE TO SLEEP OR SLEPT IN A SHELTER (INCLUDING NOW)?: NO

## 2023-03-12 ASSESSMENT — PATIENT HEALTH QUESTIONNAIRE - PHQ9
2. FEELING DOWN, DEPRESSED OR HOPELESS: 0
SUM OF ALL RESPONSES TO PHQ9 QUESTIONS 1 & 2: 0
1. LITTLE INTEREST OR PLEASURE IN DOING THINGS: 0
SUM OF ALL RESPONSES TO PHQ QUESTIONS 1-9: 0

## 2023-03-12 ASSESSMENT — LIFESTYLE VARIABLES
HOW OFTEN DO YOU HAVE SIX OR MORE DRINKS ON ONE OCCASION: 1
HOW OFTEN DO YOU HAVE A DRINK CONTAINING ALCOHOL: 4
HOW MANY STANDARD DRINKS CONTAINING ALCOHOL DO YOU HAVE ON A TYPICAL DAY: 1
HOW OFTEN DO YOU HAVE A DRINK CONTAINING ALCOHOL: 2-3 TIMES A WEEK
HOW MANY STANDARD DRINKS CONTAINING ALCOHOL DO YOU HAVE ON A TYPICAL DAY: 1 OR 2

## 2023-03-13 ENCOUNTER — OFFICE VISIT (OUTPATIENT)
Dept: FAMILY MEDICINE CLINIC | Facility: CLINIC | Age: 73
End: 2023-03-13
Payer: MEDICARE

## 2023-03-13 VITALS
SYSTOLIC BLOOD PRESSURE: 128 MMHG | BODY MASS INDEX: 29.78 KG/M2 | DIASTOLIC BLOOD PRESSURE: 80 MMHG | WEIGHT: 208 LBS | HEART RATE: 68 BPM | HEIGHT: 70 IN | OXYGEN SATURATION: 94 %

## 2023-03-13 DIAGNOSIS — J98.01 BRONCHOSPASM: ICD-10-CM

## 2023-03-13 DIAGNOSIS — Z12.11 COLON CANCER SCREENING: ICD-10-CM

## 2023-03-13 DIAGNOSIS — Z00.00 ENCOUNTER FOR SUBSEQUENT ANNUAL WELLNESS VISIT (AWV) IN MEDICARE PATIENT: Primary | ICD-10-CM

## 2023-03-13 DIAGNOSIS — N40.0 BENIGN PROSTATIC HYPERPLASIA WITHOUT LOWER URINARY TRACT SYMPTOMS: ICD-10-CM

## 2023-03-13 DIAGNOSIS — E78.5 ELEVATED LIPIDS: ICD-10-CM

## 2023-03-13 DIAGNOSIS — I10 PRIMARY HYPERTENSION: ICD-10-CM

## 2023-03-13 LAB
ALBUMIN SERPL-MCNC: 3.8 G/DL (ref 3.2–4.6)
ALBUMIN/GLOB SERPL: 1.4 (ref 0.4–1.6)
ALP SERPL-CCNC: 54 U/L (ref 50–136)
ALT SERPL-CCNC: 44 U/L (ref 12–65)
ANION GAP SERPL CALC-SCNC: 3 MMOL/L (ref 2–11)
APPEARANCE UR: CLEAR
AST SERPL-CCNC: 24 U/L (ref 15–37)
BASOPHILS # BLD: 0.1 K/UL (ref 0–0.2)
BASOPHILS NFR BLD: 1 % (ref 0–2)
BILIRUB SERPL-MCNC: 0.6 MG/DL (ref 0.2–1.1)
BILIRUB UR QL: NEGATIVE
BUN SERPL-MCNC: 25 MG/DL (ref 8–23)
CALCIUM SERPL-MCNC: 8.6 MG/DL (ref 8.3–10.4)
CHLORIDE SERPL-SCNC: 106 MMOL/L (ref 101–110)
CHOLEST SERPL-MCNC: 194 MG/DL
CO2 SERPL-SCNC: 26 MMOL/L (ref 21–32)
COLOR UR: NORMAL
CREAT SERPL-MCNC: 1 MG/DL (ref 0.8–1.5)
DIFFERENTIAL METHOD BLD: ABNORMAL
EOSINOPHIL # BLD: 0.1 K/UL (ref 0–0.8)
EOSINOPHIL NFR BLD: 2 % (ref 0.5–7.8)
ERYTHROCYTE [DISTWIDTH] IN BLOOD BY AUTOMATED COUNT: 12.8 % (ref 11.9–14.6)
GLOBULIN SER CALC-MCNC: 2.8 G/DL (ref 2.8–4.5)
GLUCOSE SERPL-MCNC: 98 MG/DL (ref 65–100)
GLUCOSE UR STRIP.AUTO-MCNC: NEGATIVE MG/DL
HCT VFR BLD AUTO: 51.4 % (ref 41.1–50.3)
HDLC SERPL-MCNC: 50 MG/DL (ref 40–60)
HDLC SERPL: 3.9
HGB BLD-MCNC: 16.8 G/DL (ref 13.6–17.2)
HGB UR QL STRIP: NEGATIVE
IMM GRANULOCYTES # BLD AUTO: 0 K/UL (ref 0–0.5)
IMM GRANULOCYTES NFR BLD AUTO: 0 % (ref 0–5)
KETONES UR QL STRIP.AUTO: NEGATIVE MG/DL
LDLC SERPL CALC-MCNC: 129.4 MG/DL
LEUKOCYTE ESTERASE UR QL STRIP.AUTO: NEGATIVE
LYMPHOCYTES # BLD: 1.6 K/UL (ref 0.5–4.6)
LYMPHOCYTES NFR BLD: 23 % (ref 13–44)
MCH RBC QN AUTO: 31.1 PG (ref 26.1–32.9)
MCHC RBC AUTO-ENTMCNC: 32.7 G/DL (ref 31.4–35)
MCV RBC AUTO: 95.2 FL (ref 82–102)
MONOCYTES # BLD: 0.8 K/UL (ref 0.1–1.3)
MONOCYTES NFR BLD: 12 % (ref 4–12)
NEUTS SEG # BLD: 4.3 K/UL (ref 1.7–8.2)
NEUTS SEG NFR BLD: 62 % (ref 43–78)
NITRITE UR QL STRIP.AUTO: NEGATIVE
NRBC # BLD: 0 K/UL (ref 0–0.2)
PH UR STRIP: 6.5 (ref 5–9)
PLATELET # BLD AUTO: 152 K/UL (ref 150–450)
PLATELET COMMENT: ADEQUATE
PMV BLD AUTO: 12.3 FL (ref 9.4–12.3)
POTASSIUM SERPL-SCNC: 5 MMOL/L (ref 3.5–5.1)
PROT SERPL-MCNC: 6.6 G/DL (ref 6.3–8.2)
PROT UR STRIP-MCNC: NEGATIVE MG/DL
PSA SERPL-MCNC: 5.6 NG/ML
RBC # BLD AUTO: 5.4 M/UL (ref 4.23–5.6)
RBC MORPH BLD: ABNORMAL
SODIUM SERPL-SCNC: 135 MMOL/L (ref 133–143)
SP GR UR REFRACTOMETRY: 1.02 (ref 1–1.02)
TRIGL SERPL-MCNC: 73 MG/DL (ref 35–150)
TSH, 3RD GENERATION: 2.31 UIU/ML (ref 0.36–3.74)
UROBILINOGEN UR QL STRIP.AUTO: 0.2 EU/DL (ref 0.2–1)
VLDLC SERPL CALC-MCNC: 14.6 MG/DL (ref 6–23)
WBC # BLD AUTO: 6.9 K/UL (ref 4.3–11.1)
WBC MORPH BLD: ABNORMAL

## 2023-03-13 PROCEDURE — G8427 DOCREV CUR MEDS BY ELIG CLIN: HCPCS | Performed by: FAMILY MEDICINE

## 2023-03-13 PROCEDURE — G8417 CALC BMI ABV UP PARAM F/U: HCPCS | Performed by: FAMILY MEDICINE

## 2023-03-13 PROCEDURE — 1036F TOBACCO NON-USER: CPT | Performed by: FAMILY MEDICINE

## 2023-03-13 PROCEDURE — G8484 FLU IMMUNIZE NO ADMIN: HCPCS | Performed by: FAMILY MEDICINE

## 2023-03-13 PROCEDURE — 3074F SYST BP LT 130 MM HG: CPT | Performed by: FAMILY MEDICINE

## 2023-03-13 PROCEDURE — 1123F ACP DISCUSS/DSCN MKR DOCD: CPT | Performed by: FAMILY MEDICINE

## 2023-03-13 PROCEDURE — 3079F DIAST BP 80-89 MM HG: CPT | Performed by: FAMILY MEDICINE

## 2023-03-13 PROCEDURE — 99214 OFFICE O/P EST MOD 30 MIN: CPT | Performed by: FAMILY MEDICINE

## 2023-03-13 PROCEDURE — 3017F COLORECTAL CA SCREEN DOC REV: CPT | Performed by: FAMILY MEDICINE

## 2023-03-13 PROCEDURE — 1111F DSCHRG MED/CURRENT MED MERGE: CPT | Performed by: FAMILY MEDICINE

## 2023-03-13 PROCEDURE — G0439 PPPS, SUBSEQ VISIT: HCPCS | Performed by: FAMILY MEDICINE

## 2023-03-13 ASSESSMENT — LIFESTYLE VARIABLES
HOW MANY STANDARD DRINKS CONTAINING ALCOHOL DO YOU HAVE ON A TYPICAL DAY: 1 OR 2
HOW OFTEN DO YOU HAVE A DRINK CONTAINING ALCOHOL: 2-3 TIMES A WEEK

## 2023-03-13 ASSESSMENT — ENCOUNTER SYMPTOMS
RESPIRATORY NEGATIVE: 1
EYES NEGATIVE: 1
GASTROINTESTINAL NEGATIVE: 1

## 2023-03-13 ASSESSMENT — PATIENT HEALTH QUESTIONNAIRE - PHQ9
SUM OF ALL RESPONSES TO PHQ QUESTIONS 1-9: 0
1. LITTLE INTEREST OR PLEASURE IN DOING THINGS: 0
2. FEELING DOWN, DEPRESSED OR HOPELESS: 0
SUM OF ALL RESPONSES TO PHQ9 QUESTIONS 1 & 2: 0

## 2023-03-13 NOTE — PATIENT INSTRUCTIONS
Advance Directives: Care Instructions  Overview  An advance directive is a legal way to state your wishes at the end of your life. It tells your family and your doctor what to do if you can't say what you want. There are two main types of advance directives. You can change them any time your wishes change. Living will. This form tells your family and your doctor your wishes about life support and other treatment. The form is also called a declaration. Medical power of . This form lets you name a person to make treatment decisions for you when you can't speak for yourself. This person is called a health care agent (health care proxy, health care surrogate). The form is also called a durable power of  for health care. If you do not have an advance directive, decisions about your medical care may be made by a family member, or by a doctor or a  who doesn't know you. It may help to think of an advance directive as a gift to the people who care for you. If you have one, they won't have to make tough decisions by themselves. For more information, including forms for your state, see the [address] website (www.caringinfo.org/planning/advance-directives/). Follow-up care is a key part of your treatment and safety. Be sure to make and go to all appointments, and call your doctor if you are having problems. It's also a good idea to know your test results and keep a list of the medicines you take. What should you include in an advance directive? Many states have a unique advance directive form. (It may ask you to address specific issues.) Or you might use a universal form that's approved by many states. If your form doesn't tell you what to address, it may be hard to know what to include in your advance directive. Use the questions below to help you get started. Who do you want to make decisions about your medical care if you are not able to?   What life-support measures do you want if you have a serious illness that gets worse over time or can't be cured? What are you most afraid of that might happen? (Maybe you're afraid of having pain, losing your independence, or being kept alive by machines.)  Where would you prefer to die? (Your home? A hospital? A nursing home?)  Do you want to donate your organs when you die? Do you want certain Adventism practices performed before you die? When should you call for help? Be sure to contact your doctor if you have any questions. Where can you learn more? Go to http://www.yarbrough.com/ and enter R264 to learn more about \"Advance Directives: Care Instructions. \"  Current as of: June 16, 2022               Content Version: 13.5  © 2006-2022 [employer]. Care instructions adapted under license by [facility] ([facility]). If you have questions about a medical condition or this instruction, always ask your healthcare professional. [place] [address] any warranty or liability for your use of this information. A Healthy Heart: Care Instructions  Your Care Instructions     Coronary artery disease, also called heart disease, occurs when a substance called plaque builds up in the vessels that supply oxygen-rich blood to your heart muscle. This can narrow the blood vessels and reduce blood flow. A heart attack happens when blood flow is completely blocked. A high-fat diet, smoking, and other factors increase the risk of heart disease. Your doctor has found that you have a chance of having heart disease. You can do lots of things to keep your heart healthy. It may not be easy, but you can change your diet, exercise more, and quit smoking. These steps really work to lower your chance of heart disease. Follow-up care is a key part of your treatment and safety. Be sure to make and go to all appointments, and call your doctor if you are having problems.  It's also a good idea to know your test results and keep a list of the medicines you take. How can you care for yourself at home? Diet    Use less salt when you cook and eat. This helps lower your blood pressure. Taste food before salting. Add only a little salt when you think you need it. With time, your taste buds will adjust to less salt.     Eat fewer snack items, fast foods, canned soups, and other high-salt, high-fat, processed foods.     Read food labels and try to avoid saturated and trans fats. They increase your risk of heart disease by raising cholesterol levels.     Limit the amount of solid fat-butter, margarine, and shortening-you eat. Use olive, peanut, or canola oil when you cook. Bake, broil, and steam foods instead of frying them.     Eat a variety of fruit and vegetables every day. Dark green, deep orange, red, or yellow fruits and vegetables are especially good for you. Examples include spinach, carrots, peaches, and berries.     Foods high in fiber can reduce your cholesterol and provide important vitamins and minerals. High-fiber foods include whole-grain cereals and breads, oatmeal, beans, brown rice, citrus fruits, and apples.     Eat lean proteins. Heart-healthy proteins include seafood, lean meats and poultry, eggs, beans, peas, nuts, seeds, and soy products.     Limit drinks and foods with added sugar. These include candy, desserts, and soda pop. Lifestyle changes    If your doctor recommends it, get more exercise. Walking is a good choice. Bit by bit, increase the amount you walk every day. Try for at least 30 minutes on most days of the week. You also may want to swim, bike, or do other activities.     Do not smoke. If you need help quitting, talk to your doctor about stop-smoking programs and medicines. These can increase your chances of quitting for good. Quitting smoking may be the most important step you can take to protect your heart. It is never too late to quit.     Limit alcohol to 2 drinks a day for men and 1 drink a day for women.  Too much alcohol can cause health problems.     Manage other health problems such as diabetes, high blood pressure, and high cholesterol. If you think you may have a problem with alcohol or drug use, talk to your doctor. Medicines    Take your medicines exactly as prescribed. Call your doctor if you think you are having a problem with your medicine.     If your doctor recommends aspirin, take the amount directed each day. Make sure you take aspirin and not another kind of pain reliever, such as acetaminophen (Tylenol). When should you call for help? Call 911 if you have symptoms of a heart attack. These may include:    Chest pain or pressure, or a strange feeling in the chest.     Sweating.     Shortness of breath.     Pain, pressure, or a strange feeling in the back, neck, jaw, or upper belly or in one or both shoulders or arms.     Lightheadedness or sudden weakness.     A fast or irregular heartbeat. After you call 911, the  may tell you to chew 1 adult-strength or 2 to 4 low-dose aspirin. Wait for an ambulance. Do not try to drive yourself. Watch closely for changes in your health, and be sure to contact your doctor if you have any problems. Where can you learn more? Go to http://www.yarbrough.com/ and enter F075 to learn more about \"A Healthy Heart: Care Instructions. \"  Current as of: September 7, 2022               Content Version: 13.5  © 0302-9227 [employer]. Care instructions adapted under license by [facility] ([facility]). If you have questions about a medical condition or this instruction, always ask your healthcare professional. [place] [address] any warranty or liability for your use of this information. Personalized Preventive Plan for Erika Coffey - 3/13/2023  Medicare offers a range of preventive health benefits. Some of the tests and screenings are paid in full while other may be subject to a deductible, co-insurance, and/or copay.     Some of these benefits include a comprehensive review of your medical history including lifestyle, illnesses that may run in your family, and various assessments and screenings as appropriate. After reviewing your medical record and screening and assessments performed today your provider may have ordered immunizations, labs, imaging, and/or referrals for you. A list of these orders (if applicable) as well as your Preventive Care list are included within your After Visit Summary for your review. Other Preventive Recommendations:    A preventive eye exam performed by an eye specialist is recommended every 1-2 years to screen for glaucoma; cataracts, macular degeneration, and other eye disorders. A preventive dental visit is recommended every 6 months. Try to get at least 150 minutes of exercise per week or 10,000 steps per day on a pedometer . Order or download the FREE \"Exercise & Physical Activity: Your Everyday Guide\" from [employer]. Call 6-274.357.9448 or search [employer] online. You need 6059-5506 mg of calcium and 4984-8334 IU of vitamin D per day. It is possible to meet your calcium requirement with diet alone, but a vitamin D supplement is usually necessary to meet this goal.  When exposed to the sun, use a sunscreen that protects against both UVA and UVB radiation with an SPF of 30 or greater. Reapply every 2 to 3 hours or after sweating, drying off with a towel, or swimming. Always wear a seat belt when traveling in a car. Always wear a helmet when riding a bicycle or motorcycle.

## 2023-03-13 NOTE — PROGRESS NOTES
HISTORY OF PRESENT ILLNESS    Rodrigo Andino is a 67 y.o. male. HPI  Chief Complaint   Patient presents with    Medicare AWV     Eye exam and colonoscopy to be schedule      See above. Overall feeling well. Some residual fatigue after hospitalization for hypoxia related to apparent viral respiratory illness. While in hospital had atrial fib which resolved and has not returned; followed by cardiology. No side effects from BP medication. No headache or vision change. Denies chest pain or SOB. No swelling. No longer needing inhaler for cough/wheezing. Followed by urologist for BPH; stable with no symptoms. Current Outpatient Medications on File Prior to Visit   Medication Sig Dispense Refill    apixaban (ELIQUIS) 5 MG TABS tablet Take 1 tablet by mouth 2 times daily 60 tablet 11    dilTIAZem (CARDIZEM CD) 240 MG extended release capsule Take 1 capsule by mouth daily 30 capsule 11    albuterol (PROVENTIL) (2.5 MG/3ML) 0.083% nebulizer solution Take 3 mLs by nebulization every 6 hours while awake 120 each 0    lisinopril (PRINIVIL;ZESTRIL) 10 MG tablet Take 1 tablet by mouth daily 90 tablet 3     No current facility-administered medications on file prior to visit. Past Medical History:   Diagnosis Date    Allergy desensitization therapy     receives injections every other week    Asthma 2010    Backache, unspecified 10/24/2016    Cervicalgia 10/24/2016    H/O complete eye exam Fall 2019    Dr. Jaden Varela, [address]    Hypertension     Pain in joint, multiple sites 10/24/2016    Positive HOMERO (antinuclear antibody) 10/24/2016    Work-up done with Rheumatology, negative    Shingles 1990's    hospitalized for 6 days    Toe fracture, left 2019    5th metatarcel       Review of Systems   Constitutional: Negative. HENT: Negative. Eyes: Negative. Respiratory: Negative. Cardiovascular: Negative. Gastrointestinal: Negative. Genitourinary: Negative. Musculoskeletal: Negative. Allergic/Immunologic: Positive for environmental allergies. Negative for food allergies and immunocompromised state. Neurological: Negative. Psychiatric/Behavioral: Negative. Blood pressure 128/80, pulse 68, height 5' 10.08\" (1.78 m), weight 208 lb (94.3 kg), SpO2 94 %. Physical Exam  Vitals and nursing note reviewed. Constitutional:       Appearance: Normal appearance. HENT:      Right Ear: Tympanic membrane normal.      Left Ear: Tympanic membrane normal.      Mouth/Throat:      Mouth: Mucous membranes are moist.      Pharynx: Oropharynx is clear. Eyes:      Extraocular Movements: Extraocular movements intact. Conjunctiva/sclera: Conjunctivae normal.      Pupils: Pupils are equal, round, and reactive to light. Neck:      Vascular: No carotid bruit. Cardiovascular:      Rate and Rhythm: Normal rate and regular rhythm. Heart sounds: Normal heart sounds. Pulmonary:      Breath sounds: Normal breath sounds. Musculoskeletal:         General: No swelling or tenderness. Cervical back: Neck supple. Lymphadenopathy:      Cervical: No cervical adenopathy. Neurological:      Cranial Nerves: No cranial nerve deficit. Coordination: Coordination normal.      Gait: Gait normal.      Deep Tendon Reflexes: Reflexes normal.   Psychiatric:         Mood and Affect: Mood normal.        ASSESSMENT and PLAN    Minnie Donovan was seen today for medicare awv. Diagnoses and all orders for this visit:    Encounter for subsequent annual wellness visit (AWV) in Medicare patient    Primary hypertension  -     Urinalysis; Future  -     CBC with Auto Differential; Future  -     Comprehensive Metabolic Panel; Future  -     TSH; Future  -     TSH  -     Comprehensive Metabolic Panel  -     CBC with Auto Differential  -     Urinalysis    Bronchospasm    Benign prostatic hyperplasia without lower urinary tract symptoms  -     PSA, Diagnostic;  Future  -     PSA, Diagnostic    Elevated lipids  - Lipid Panel; Future  -     Lipid Panel   Discussed history and medications with patient. Continue current measures. Follow up if side effects occur or if new symptoms develop. Notify lab results  Schedule colonoscopy. Return in 1 year (on 3/13/2024), or if symptoms worsen or fail to improve, for Medicare Annual Wellness Visit in 1 year. Aaron Kay MDMedicare Annual Wellness Visit    Cristiane Sheikh is here for Medicare AWV (Eye exam and colonoscopy to be schedule )    Assessment & Plan   Encounter for subsequent annual wellness visit (AWV) in Medicare patient  Primary hypertension  -     Urinalysis; Future  -     CBC with Auto Differential; Future  -     Comprehensive Metabolic Panel; Future  -     TSH; Future  Bronchospasm  Benign prostatic hyperplasia without lower urinary tract symptoms  -     PSA, Diagnostic; Future  Elevated lipids  -     Lipid Panel; Future    Recommendations for Preventive Services Due: see orders and patient instructions/AVS.  Recommended screening schedule for the next 5-10 years is provided to the patient in written form: see Patient Instructions/AVS.     Return in 1 year (on 3/13/2024), or if symptoms worsen or fail to improve, for Medicare Annual Wellness Visit in 1 year. Subjective       Patient's complete Health Risk Assessment and screening values have been reviewed and are found in Flowsheets. The following problems were reviewed today and where indicated follow up appointments were made and/or referrals ordered. Positive Risk Factor Screenings with Interventions:                                       Objective   Vitals:    03/13/23 0815   BP: 128/80   Site: Right Upper Arm   Position: Sitting   Cuff Size: Medium Adult   Pulse: 68   SpO2: 94%   Weight: 208 lb (94.3 kg)   Height: 5' 10.08\" (1.78 m)      Body mass index is 29.78 kg/m². No Known Allergies  Prior to Visit Medications    Medication Sig Taking?  Authorizing Provider   apixaban (ELIQUIS) 5 MG TABS tablet Take 1 tablet by mouth 2 times daily Yes Rona Ambrosio,    dilTIAZem (CARDIZEM CD) 240 MG extended release capsule Take 1 capsule by mouth daily Yes Rona Ambrosio,    albuterol (PROVENTIL) (2.5 MG/3ML) 0.083% nebulizer solution Take 3 mLs by nebulization every 6 hours while awake Yes Selvin Monzon,    lisinopril (PRINIVIL;ZESTRIL) 10 MG tablet Take 1 tablet by mouth daily Yes Uvaldo Lr MD       [facility] (Including outside providers/suppliers regularly involved in providing care):   Patient Care Team:  Uvaldo Lr MD as PCP - General  Uvaldo Lr MD as PCP - Empaneled Provider  Arnoldo Benson LPN as Care Coordinator     Reviewed and updated this visit:  Tobacco  Allergies  Meds  Med Hx  Surg Hx  Soc Hx  Fam Hx             Italo Yanes MD

## 2023-03-14 ENCOUNTER — CARE COORDINATION (OUTPATIENT)
Dept: CARE COORDINATION | Facility: CLINIC | Age: 73
End: 2023-03-14

## 2023-03-14 NOTE — CARE COORDINATION
[facility] Care Transitions Follow Up Call    Patient Current Location:  Home: [address] [place] [id number]    [facility] Care Coordinator contacted the patient by telephone to follow up after admission on 23. Verified name and  with patient as identifiers. Patient: Len Ley  Patient : 1950   MRN: [id number]  Reason for Admission: Acute respiratory failure with hypoxia  Discharge Date: 3/2/23 RARS: Readmission Risk Score: 8.7      Needs to be reviewed by the provider   Additional needs identified to be addressed with provider: No  none             Method of communication with provider: none. Patient is progressing well, has attended all follow up as scheduled. Denies new needs or concerns. Addressed changes since last contact:  none  Discussed follow-up appointments. If no appointment was previously scheduled, appointment scheduling offered: Yes. Is follow up appointment scheduled within 7 days of discharge? Yes. Follow Up  Future Appointments   Date Time Provider Danae Ratliff   2023  2:45 PM Helga Waterman DO [facility]   3/18/2024  8:00 AM Brianne Sher MD [facility] AMB   3/18/2024  9:00 AM [facility] LAB RESOURCE [facility] AMB     [facility] follow up appointment(s): ester    N Care Coordinator reviewed medical action plan with patient and discussed any barriers to care and/or understanding of plan of care after discharge. Discussed appropriate site of care based on symptoms and resources available to patient including: PCP  Specialist  [employer]. The patient agrees to contact the PCP office for questions related to their healthcare. Advance Care Planning:   reviewed and current.      Patients top risk factors for readmission:  comorbidities and/or recurrent symptoms  Interventions to address risk factors:  continued compliance with plan of care    Offered patient enrollment in the Remote Patient Monitoring (RPM) program for in-home monitoring: NA.     Care Transitions Subsequent and Final Call    Schedule Follow Up Appointment with PCP: Completed  Subsequent and Final Calls  Do you have any ongoing symptoms?: No  Have your medications changed?: No  Do you have any questions related to your medications?: No  Do you currently have any active services?: No  Do you have any needs or concerns that I can assist you with?: No  Identified Barriers: None  Care Transitions Interventions  No Identified Needs  Other Interventions:             LPN Care Coordinator provided contact information for future needs. Plan for follow-up call in 10-14 days based on severity of symptoms and risk factors.   Plan for next call:  compliance with plan of care    Maciej Murray LPN

## 2023-03-22 NOTE — PROGRESS NOTES
Physician Progress Note      Santo Vasquez  [facility] #:                  [id number]  :                       1950  ADMIT DATE:       2023 8:54 PM  [address] [place] DATE:        3/2/2023 1:10 PM  RESPONDING  PROVIDER #:        Rusty Harrison DO          QUERY TEXT:    Patient admitted with acute asthma exacerbation. Noted documentation of acute   respiratory failure in note. In order to support the diagnosis of acute   respiratory failure, please include additional clinical indicators in your   documentation. Or please document if the diagnosis of acute respiratory   failure has been ruled out after further study. The medical record reflects the following:  Risk Factors: asthma exacerbation, Afib with RVR, obesity , Human   metapneumovirus    Clinical Indicators: Resps--16-29, requiring at one point 11l hiflo nc. Cxray--Streaky bibasilar opacities, likely reflecting atelectasis, though   infection/aspiration are also considerations in the appropriate context. No   mention of struggling, accessory muscle use etc, ABG not done. \"Respiratory:   No respiratory distress or accessory muscle use.  Pt with cough ,wheezing    Treatment: Oxygen, nebs, steroids, cardizem drip for afib, ICU care, cxray,   essential home meds    Acute Respiratory Failure Clinical Indicators per 3M MS-DRG Training Guide and   Quick Reference Guide:  pO2 < 60 mmHg or SpO2 (pulse oximetry) < 91% breathing room air  pCO2 > 50 and pH < 7.35  P/F ratio (pO2 / FIO2) < 300  pO2 decrease or pCO2 increase by 10 mmHg from baseline (if known)  Supplemental oxygen of 40% or more  Presence of respiratory distress, tachypnea, dyspnea, shortness of breath,   wheezing  Unable to speak in complete sentences  Use of accessory muscles to breathe  Extreme anxiety and feeling of impending doom  Tripod position  Confusion/altered mental status/obtunded  Options provided:  -- Acute Respiratory Failure as evidenced by, Please document

## 2023-03-28 ENCOUNTER — CARE COORDINATION (OUTPATIENT)
Dept: CARE COORDINATION | Facility: CLINIC | Age: 73
End: 2023-03-28

## 2023-03-28 NOTE — CARE COORDINATION
Patient has graduated from the Care Transitions program on 3.28.23. Attempted outreach follow up without success, left message. Per chart review patient has followed plan of care and attended follow up visits as scheduled. No new needs are noted. Patient has LPN Care Coordinator's contact information for any further questions, concerns, or needs.   Patients upcoming visits:    Future Appointments   Date Time Provider Danae Ratliff   9/8/2023  2:45 PM [facility]   3/18/2024  8:00 AM Romaine Bhatti MD [facility] GV AMB   3/18/2024  9:00 AM [facility] LAB RESOURCE [facility] AMB

## 2023-04-25 ENCOUNTER — TELEPHONE (OUTPATIENT)
Dept: FAMILY MEDICINE CLINIC | Facility: CLINIC | Age: 73
End: 2023-04-25

## 2023-04-25 RX ORDER — CIPROFLOXACIN 500 MG/1
500 TABLET, FILM COATED ORAL 2 TIMES DAILY
Qty: 20 TABLET | Refills: 0 | Status: SHIPPED | OUTPATIENT
Start: 2023-04-25 | End: 2023-05-05

## 2023-04-25 NOTE — TELEPHONE ENCOUNTER
Please notify PSA is elevated.  Recommend Cipro 500mg #20 1 po bid and recheck free and total PSA in 4 weeks

## 2023-05-23 ENCOUNTER — CLINICAL DOCUMENTATION (OUTPATIENT)
Dept: SURGERY | Age: 73
End: 2023-05-23

## 2023-05-23 NOTE — PROGRESS NOTES
I have reviewed the patient's chart and consider the patient an acceptable risk for screening colonoscopy without a formal office visit. We will contact the patient to give the details of the bowel prep and to schedule screening colonoscopy in the near future. Colonoscopy: 2011 \"clear\" Dr Carlito Paul  Anticoagulation: Eliquis for Afib started March 2023- Dr Pena [facility] cardiology) pre procedure hold requested  Family Hx: non contributory     Once the colonoscopy has been completed, the Health Maintenance will be updated accordingly.      Marek Mann, APRN - CNP

## 2023-05-24 ENCOUNTER — PREP FOR PROCEDURE (OUTPATIENT)
Dept: SURGERY | Age: 73
End: 2023-05-24

## 2023-05-24 PROBLEM — Z12.11 SPECIAL SCREENING FOR MALIGNANT NEOPLASMS, COLON: Status: ACTIVE | Noted: 2023-05-24

## 2023-05-25 DIAGNOSIS — N40.0 BENIGN PROSTATIC HYPERPLASIA WITHOUT LOWER URINARY TRACT SYMPTOMS: Primary | ICD-10-CM

## 2023-05-25 DIAGNOSIS — R97.20 ELEVATED PSA: ICD-10-CM

## 2023-05-26 ENCOUNTER — NURSE ONLY (OUTPATIENT)
Dept: FAMILY MEDICINE CLINIC | Facility: CLINIC | Age: 73
End: 2023-05-26

## 2023-05-26 DIAGNOSIS — N40.0 BENIGN PROSTATIC HYPERPLASIA WITHOUT LOWER URINARY TRACT SYMPTOMS: ICD-10-CM

## 2023-05-26 DIAGNOSIS — R97.20 ELEVATED PSA: ICD-10-CM

## 2023-05-26 LAB — PSA SERPL-MCNC: 3.5 NG/ML

## 2023-06-23 PROBLEM — Z12.11 SPECIAL SCREENING FOR MALIGNANT NEOPLASMS, COLON: Status: RESOLVED | Noted: 2023-05-24 | Resolved: 2023-06-23

## 2023-09-08 ENCOUNTER — OFFICE VISIT (OUTPATIENT)
Age: 73
End: 2023-09-08
Payer: MEDICARE

## 2023-09-08 VITALS
HEART RATE: 60 BPM | SYSTOLIC BLOOD PRESSURE: 120 MMHG | BODY MASS INDEX: 31.21 KG/M2 | WEIGHT: 218 LBS | HEIGHT: 70 IN | DIASTOLIC BLOOD PRESSURE: 72 MMHG

## 2023-09-08 DIAGNOSIS — I48.0 PAROXYSMAL ATRIAL FIBRILLATION (HCC): Primary | ICD-10-CM

## 2023-09-08 DIAGNOSIS — I10 ESSENTIAL HYPERTENSION: ICD-10-CM

## 2023-09-08 DIAGNOSIS — E78.2 MIXED HYPERLIPIDEMIA: ICD-10-CM

## 2023-09-08 DIAGNOSIS — E66.09 CLASS 1 OBESITY DUE TO EXCESS CALORIES WITH SERIOUS COMORBIDITY AND BODY MASS INDEX (BMI) OF 33.0 TO 33.9 IN ADULT: ICD-10-CM

## 2023-09-08 DIAGNOSIS — E87.1 HYPONATREMIA: ICD-10-CM

## 2023-09-08 PROCEDURE — G8417 CALC BMI ABV UP PARAM F/U: HCPCS | Performed by: INTERNAL MEDICINE

## 2023-09-08 PROCEDURE — 99214 OFFICE O/P EST MOD 30 MIN: CPT | Performed by: INTERNAL MEDICINE

## 2023-09-08 PROCEDURE — 1123F ACP DISCUSS/DSCN MKR DOCD: CPT | Performed by: INTERNAL MEDICINE

## 2023-09-08 PROCEDURE — 3074F SYST BP LT 130 MM HG: CPT | Performed by: INTERNAL MEDICINE

## 2023-09-08 PROCEDURE — G8428 CUR MEDS NOT DOCUMENT: HCPCS | Performed by: INTERNAL MEDICINE

## 2023-09-08 PROCEDURE — 3017F COLORECTAL CA SCREEN DOC REV: CPT | Performed by: INTERNAL MEDICINE

## 2023-09-08 PROCEDURE — 3078F DIAST BP <80 MM HG: CPT | Performed by: INTERNAL MEDICINE

## 2023-09-08 PROCEDURE — 1036F TOBACCO NON-USER: CPT | Performed by: INTERNAL MEDICINE

## 2023-09-08 ASSESSMENT — ENCOUNTER SYMPTOMS
RESPIRATORY NEGATIVE: 1
WHEEZING: 0
SHORTNESS OF BREATH: 0
COUGH: 0

## 2023-09-08 NOTE — PROGRESS NOTES
[address] [place]     [address] [facility], [address] [place] [address]      Patient:  Linda Blanchard  1950     SUBJECTIVE:  Linda Blanchard is a  68 y.o. male seen for a follow up visit regarding the following:     Chief Complaint   Patient presents with    Atrial Fibrillation     PAF    Hypertension     CC: Paroxysmal atrial fibrillation     HPI:   68 y.o. male with a history of paroxysmal atrial fibrillation, HTN who is here for follow-up after admission to the hospital in late February 2023. Patient was last seen in office on 3/8/23 , since then reports that he has been doing well. No ER or hospitalizations for heart problems. No irregular heart beats or palpitations. Walks 2-3 miles per day with his dog, no chest pain. Some easy bruising. No significant bleeding events. Taking his medications as directed without missing doses. No other complaints at this time. Cardiovascular Testing:  - Echo 2/27/23: LVEF 50-55 %, RV normal, Valves: Trace MR, trace TR. Past medical history, past surgical history, family history, social history, and medications were all reviewed with the patient today and updated as necessary.          Patient Active Problem List    Diagnosis Date Noted    Hypercoagulable state due to paroxysmal atrial fibrillation ([address]) 02/28/2023     Priority: Medium    Class 1 obesity due to excess calories with serious comorbidity and body mass index (BMI) of 33.0 to 33.9 in adult 02/28/2023     Priority: Medium    Acute asthma exacerbation 02/27/2023     Priority: Medium    Hyponatremia 02/27/2023     Priority: Medium    Acute respiratory failure with hypoxia ([address]) 02/27/2023     Priority: Medium    Atrial fibrillation with rapid ventricular response ([address]) 02/27/2023     Priority: Medium    Mild bibasilar atelectasis 02/27/2023     Priority: Medium    Cervicalgia 10/24/2016     Priority: Low    Positive HOMERO (antinuclear antibody) 10/24/2016     Priority: Low    Pain in joint,

## 2023-09-12 ENCOUNTER — PREP FOR PROCEDURE (OUTPATIENT)
Dept: SURGERY | Age: 73
End: 2023-09-12

## 2023-09-12 RX ORDER — SODIUM CHLORIDE 0.9 % (FLUSH) 0.9 %
5-40 SYRINGE (ML) INJECTION EVERY 12 HOURS SCHEDULED
Status: CANCELLED | OUTPATIENT
Start: 2023-09-12

## 2023-09-12 RX ORDER — SODIUM CHLORIDE 9 MG/ML
INJECTION, SOLUTION INTRAVENOUS PRN
Status: CANCELLED | OUTPATIENT
Start: 2023-09-12

## 2023-09-12 RX ORDER — SODIUM CHLORIDE 0.9 % (FLUSH) 0.9 %
5-40 SYRINGE (ML) INJECTION PRN
Status: CANCELLED | OUTPATIENT
Start: 2023-09-12

## 2023-09-19 NOTE — PERIOP NOTE
Patient verified name, , and procedure. Type: 1a; abbreviated assessment per anesthesia guidelines    Labs per anesthesia: not indicated    Instructed pt that they will be notified the day before their procedure by the GI Lab for time of arrival if their procedure is [place] and Pre-op for [facility] cases. Arrival times should be called by 5 pm. If no phone is received the patient should contact their respective hospital. The GI lab telephone number is [phone] and ES Pre-op is [phone]. Follow diet and prep instructions per office including NPO status. If patient has NOT received instructions from office patient is advised to call surgeon office, verbalizes understanding. Bath or shower the night before and the am of surgery with non-moisturizing soap. No lotions, oils, powders, cologne on skin. No make up, eye make up or jewelry. Wear loose fitting comfortable, clean clothing. Must have adult present in building the entire time . Medications for the day of procedure albuterol nebulizer if needed,  diltiazem. Patient to hold vitamins and supplements 7 days prior  and NSAIDS 5 days prior to surgery per anesthesia guidelines. Hold eliquis as instructed by surgeon. Patient states he was instructed to hold the eliquis for 2 days. Hold lisinopril the morning of surgery. The following discharge instructions reviewed with patient: medication given during procedure may cause drowsiness for several hours, therefore, do not drive or operate machinery for remainder of the day. You may not drink alcohol on the day of your procedure, please resume regular diet and activity unless otherwise directed. You may experience abdominal distention for several hours that is relieved by the passage of gas. Contact your physician if you have any of the following: fever or chills, severe abdominal pain or excessive amount of bleeding or a large amount when having a bowel movement.  Occasional specks of blood with

## 2023-09-20 ENCOUNTER — PATIENT MESSAGE (OUTPATIENT)
Dept: FAMILY MEDICINE CLINIC | Facility: CLINIC | Age: 73
End: 2023-09-20

## 2023-09-21 PROBLEM — Z12.11 SPECIAL SCREENING FOR MALIGNANT NEOPLASMS, COLON: Status: ACTIVE | Noted: 2023-05-24

## 2023-09-21 RX ORDER — LISINOPRIL 10 MG/1
10 TABLET ORAL DAILY
Qty: 90 TABLET | Refills: 0 | OUTPATIENT
Start: 2023-09-21

## 2023-09-21 RX ORDER — LISINOPRIL 10 MG/1
10 TABLET ORAL DAILY
Qty: 90 TABLET | Refills: 3 | Status: SHIPPED | OUTPATIENT
Start: 2023-09-21

## 2023-09-21 NOTE — TELEPHONE ENCOUNTER
From: Toi Gonzalez  To: Dr. Nate Goldsmith  Sent: 9/20/2023 8:08 PM EDT  Subject: Need Rx refill of Lisinopril 10MG Tab Leg    My Rx has run out and I need to refill this prescription. Will you please renew this Rx.   Thanks,  Navjot Rivera

## 2023-09-25 ENCOUNTER — ANESTHESIA EVENT (OUTPATIENT)
Dept: ENDOSCOPY | Age: 73
End: 2023-09-25
Payer: MEDICARE

## 2023-09-25 RX ORDER — DEXTROSE MONOHYDRATE 100 MG/ML
INJECTION, SOLUTION INTRAVENOUS CONTINUOUS PRN
Status: CANCELLED | OUTPATIENT
Start: 2023-09-25

## 2023-09-25 RX ORDER — SODIUM CHLORIDE, SODIUM LACTATE, POTASSIUM CHLORIDE, CALCIUM CHLORIDE 600; 310; 30; 20 MG/100ML; MG/100ML; MG/100ML; MG/100ML
INJECTION, SOLUTION INTRAVENOUS CONTINUOUS
Status: CANCELLED | OUTPATIENT
Start: 2023-09-25

## 2023-09-25 RX ORDER — SODIUM CHLORIDE 0.9 % (FLUSH) 0.9 %
5-40 SYRINGE (ML) INJECTION EVERY 12 HOURS SCHEDULED
Status: CANCELLED | OUTPATIENT
Start: 2023-09-25

## 2023-09-25 RX ORDER — SODIUM CHLORIDE 0.9 % (FLUSH) 0.9 %
5-40 SYRINGE (ML) INJECTION PRN
Status: CANCELLED | OUTPATIENT
Start: 2023-09-25

## 2023-09-25 RX ORDER — ONDANSETRON 2 MG/ML
4 INJECTION INTRAMUSCULAR; INTRAVENOUS
Status: CANCELLED | OUTPATIENT
Start: 2023-09-25 | End: 2023-09-26

## 2023-09-25 RX ORDER — SODIUM CHLORIDE 9 MG/ML
INJECTION, SOLUTION INTRAVENOUS PRN
Status: CANCELLED | OUTPATIENT
Start: 2023-09-25

## 2023-09-25 NOTE — PROGRESS NOTES
RN left message with Pt to confirm appointment time of 0740, arrival time 0610, location,  requirement, and instructions for registration at the hospital.

## 2023-09-25 NOTE — PROGRESS NOTES
Patient called to confirm scheduled procedure. Patient informed on arrival time ([facility]), entry location ([address] and  policy. Opportunity for questions provided, no voiced concerns.     Patient not taking Tikosyn  Patient not on Dialysis

## 2023-09-26 ENCOUNTER — HOSPITAL ENCOUNTER (OUTPATIENT)
Age: 73
Setting detail: OUTPATIENT SURGERY
Discharge: HOME | End: 2023-09-26
Attending: STUDENT IN AN ORGANIZED HEALTH CARE EDUCATION/TRAINING PROGRAM | Admitting: STUDENT IN AN ORGANIZED HEALTH CARE EDUCATION/TRAINING PROGRAM
Payer: MEDICARE

## 2023-09-26 ENCOUNTER — ANESTHESIA (OUTPATIENT)
Dept: ENDOSCOPY | Age: 73
End: 2023-09-26
Payer: MEDICARE

## 2023-09-26 VITALS
SYSTOLIC BLOOD PRESSURE: 117 MMHG | HEART RATE: 55 BPM | OXYGEN SATURATION: 92 % | DIASTOLIC BLOOD PRESSURE: 72 MMHG | HEIGHT: 71 IN | BODY MASS INDEX: 30.1 KG/M2 | WEIGHT: 215 LBS | RESPIRATION RATE: 18 BRPM | TEMPERATURE: 98.6 F

## 2023-09-26 DIAGNOSIS — Z12.11 SPECIAL SCREENING FOR MALIGNANT NEOPLASMS, COLON: ICD-10-CM

## 2023-09-26 PROCEDURE — 2500000003 HC RX 250 WO HCPCS: Performed by: REGISTERED NURSE

## 2023-09-26 PROCEDURE — 6360000002 HC RX W HCPCS: Performed by: REGISTERED NURSE

## 2023-09-26 PROCEDURE — 3700000000 HC ANESTHESIA ATTENDED CARE: Performed by: STUDENT IN AN ORGANIZED HEALTH CARE EDUCATION/TRAINING PROGRAM

## 2023-09-26 PROCEDURE — 3609010600 HC COLONOSCOPY POLYPECTOMY SNARE/COLD BIOPSY: Performed by: STUDENT IN AN ORGANIZED HEALTH CARE EDUCATION/TRAINING PROGRAM

## 2023-09-26 PROCEDURE — 2580000003 HC RX 258: Performed by: ANESTHESIOLOGY

## 2023-09-26 PROCEDURE — 7100000010 HC PHASE II RECOVERY - FIRST 15 MIN: Performed by: STUDENT IN AN ORGANIZED HEALTH CARE EDUCATION/TRAINING PROGRAM

## 2023-09-26 PROCEDURE — 3700000001 HC ADD 15 MINUTES (ANESTHESIA): Performed by: STUDENT IN AN ORGANIZED HEALTH CARE EDUCATION/TRAINING PROGRAM

## 2023-09-26 PROCEDURE — 88305 TISSUE EXAM BY PATHOLOGIST: CPT

## 2023-09-26 PROCEDURE — 2709999900 HC NON-CHARGEABLE SUPPLY: Performed by: STUDENT IN AN ORGANIZED HEALTH CARE EDUCATION/TRAINING PROGRAM

## 2023-09-26 PROCEDURE — 7100000011 HC PHASE II RECOVERY - ADDTL 15 MIN: Performed by: STUDENT IN AN ORGANIZED HEALTH CARE EDUCATION/TRAINING PROGRAM

## 2023-09-26 RX ORDER — LIDOCAINE HYDROCHLORIDE 20 MG/ML
INJECTION, SOLUTION EPIDURAL; INFILTRATION; INTRACAUDAL; PERINEURAL PRN
Status: DISCONTINUED | OUTPATIENT
Start: 2023-09-26 | End: 2023-09-26 | Stop reason: SDUPTHER

## 2023-09-26 RX ORDER — SODIUM CHLORIDE 0.9 % (FLUSH) 0.9 %
5-40 SYRINGE (ML) INJECTION PRN
Status: DISCONTINUED | OUTPATIENT
Start: 2023-09-26 | End: 2023-09-26 | Stop reason: HOSPADM

## 2023-09-26 RX ORDER — PROPOFOL 10 MG/ML
INJECTION, EMULSION INTRAVENOUS PRN
Status: DISCONTINUED | OUTPATIENT
Start: 2023-09-26 | End: 2023-09-26 | Stop reason: SDUPTHER

## 2023-09-26 RX ORDER — SODIUM CHLORIDE 9 MG/ML
INJECTION, SOLUTION INTRAVENOUS PRN
Status: DISCONTINUED | OUTPATIENT
Start: 2023-09-26 | End: 2023-09-26 | Stop reason: HOSPADM

## 2023-09-26 RX ORDER — LIDOCAINE HYDROCHLORIDE 10 MG/ML
1 INJECTION, SOLUTION INFILTRATION; PERINEURAL
Status: DISCONTINUED | OUTPATIENT
Start: 2023-09-26 | End: 2023-09-26 | Stop reason: HOSPADM

## 2023-09-26 RX ORDER — SODIUM CHLORIDE, SODIUM LACTATE, POTASSIUM CHLORIDE, CALCIUM CHLORIDE 600; 310; 30; 20 MG/100ML; MG/100ML; MG/100ML; MG/100ML
INJECTION, SOLUTION INTRAVENOUS CONTINUOUS
Status: DISCONTINUED | OUTPATIENT
Start: 2023-09-26 | End: 2023-09-26 | Stop reason: HOSPADM

## 2023-09-26 RX ORDER — SODIUM CHLORIDE 0.9 % (FLUSH) 0.9 %
5-40 SYRINGE (ML) INJECTION EVERY 12 HOURS SCHEDULED
Status: DISCONTINUED | OUTPATIENT
Start: 2023-09-26 | End: 2023-09-26 | Stop reason: HOSPADM

## 2023-09-26 RX ADMIN — PROPOFOL 70 MG: 10 INJECTION, EMULSION INTRAVENOUS at 07:46

## 2023-09-26 RX ADMIN — PROPOFOL 140 MCG/KG/MIN: 10 INJECTION, EMULSION INTRAVENOUS at 07:47

## 2023-09-26 RX ADMIN — SODIUM CHLORIDE, SODIUM LACTATE, POTASSIUM CHLORIDE, AND CALCIUM CHLORIDE: 600; 310; 30; 20 INJECTION, SOLUTION INTRAVENOUS at 07:39

## 2023-09-26 RX ADMIN — LIDOCAINE HYDROCHLORIDE 60 MG: 20 INJECTION, SOLUTION EPIDURAL; INFILTRATION; INTRACAUDAL; PERINEURAL at 07:46

## 2023-09-26 RX ADMIN — SODIUM CHLORIDE, SODIUM LACTATE, POTASSIUM CHLORIDE, AND CALCIUM CHLORIDE: 600; 310; 30; 20 INJECTION, SOLUTION INTRAVENOUS at 06:51

## 2023-09-26 ASSESSMENT — PAIN SCALES - GENERAL
PAINLEVEL_OUTOF10: 0
PAINLEVEL_OUTOF10: 0

## 2023-09-26 ASSESSMENT — PAIN - FUNCTIONAL ASSESSMENT: PAIN_FUNCTIONAL_ASSESSMENT: 0-10

## 2023-09-26 NOTE — PROGRESS NOTES
Patient discharged. Passing flatus. Tolerating po fluids. No acute distress noted. Escorted to car, with family by Sapphire Cordova RN.

## 2023-09-26 NOTE — ADDENDUM NOTE
Addendum  created 09/26/23 1011 by Aydin Cast MD    Attestation recorded in [facility], [address] [place] [address] filed

## 2023-09-26 NOTE — OP NOTE
splenic flexure, descending colon, and sigmoid colon showed no evidence of masses, polyps, ulcerations, erosions, strictures, diverticula, or AVMs. The rectum was unremarkable in appearance. The endoscope was withdrawn to the anorectal verge and retroflexed revealing normal anorectal mucosal.  The endoscope was then removed from the patient. The patient tolerated procedure well without complications. Patient brought back to recovery room in satisfactory condition. RECOMMENDATIONS: pending path    This recommendation is based on current guidelines and will prevent most but not all colon cancer cases.      Electronically Signed by: Shyla Owusu DO 9/26/2023     ASGE Polypectomy Surveillance Guidelines    10 years Hyperplastic Polyp(s)    7 years  1-2 Tubular Adenoma(s) (<10 mm)    Serrated Polyp (<10 mm) without Dysplasia    5 years  1-2 sessile serrated polyps    3 years  3 -10 Tubular Adenomas    One or more Tubular Adenoma(s) (>10 mm)    One or more Villous Adenoma(s)    Adenoma with High Grade Dysplasia    Sessile Serrated Polyp (>10 mm)    Sessile Serrated Polyp with Dysplasia    Traditional Serrated Adenoma    1 year  More than 10 Adenomas

## 2023-09-26 NOTE — ANESTHESIA POSTPROCEDURE EVALUATION
Department of Anesthesiology  Postprocedure Note    Patient: Jaclyn Head  MRN: [id number]  YOB: 1950  Date of evaluation: 9/26/2023      Procedure Summary     Date: 09/26/23 Room / Location: [facility] ENDO 03 / [facility] ENDOSCOPY    Anesthesia Start: 0740 Anesthesia Stop: 0813    Procedure: COLONOSCOPY POLYPECTOMY SNARE/COLD BIOPSY (Lower GI Region) Diagnosis:       Special screening for malignant neoplasms, colon      (Special screening for malignant neoplasms, colon [Z12.11])    Surgeons: Shyla Owusu DO Responsible Provider: Fior Gibson MD    Anesthesia Type: TIVA ASA Status: 3          Anesthesia Type: TIVA    Allan Phase I: Allan Score: 10    Allan Phase II: Allan Score: 10      Anesthesia Post Evaluation    Patient location during evaluation: PACU  Patient participation: complete - patient participated  Level of consciousness: awake and alert  Airway patency: patent  Nausea: well controlled. Complications: no  Cardiovascular status: acceptable.   Respiratory status: acceptable  Hydration status: stable  Pain management: adequate

## 2023-10-21 PROBLEM — Z12.11 SPECIAL SCREENING FOR MALIGNANT NEOPLASMS, COLON: Status: RESOLVED | Noted: 2023-05-24 | Resolved: 2023-10-21

## 2024-03-15 SDOH — HEALTH STABILITY: PHYSICAL HEALTH: ON AVERAGE, HOW MANY DAYS PER WEEK DO YOU ENGAGE IN MODERATE TO STRENUOUS EXERCISE (LIKE A BRISK WALK)?: 7 DAYS

## 2024-03-15 SDOH — HEALTH STABILITY: PHYSICAL HEALTH: ON AVERAGE, HOW MANY MINUTES DO YOU ENGAGE IN EXERCISE AT THIS LEVEL?: 60 MIN

## 2024-03-15 ASSESSMENT — LIFESTYLE VARIABLES
HAVE YOU OR SOMEONE ELSE BEEN INJURED AS A RESULT OF YOUR DRINKING: NO
HOW OFTEN DURING THE LAST YEAR HAVE YOU BEEN UNABLE TO REMEMBER WHAT HAPPENED THE NIGHT BEFORE BECAUSE YOU HAD BEEN DRINKING: NEVER
HOW OFTEN DO YOU HAVE A DRINK CONTAINING ALCOHOL: 4 OR MORE TIMES A WEEK
HAS A RELATIVE, FRIEND, DOCTOR, OR ANOTHER HEALTH PROFESSIONAL EXPRESSED CONCERN ABOUT YOUR DRINKING OR SUGGESTED YOU CUT DOWN: NO
HOW OFTEN DURING THE LAST YEAR HAVE YOU FOUND THAT YOU WERE NOT ABLE TO STOP DRINKING ONCE YOU HAD STARTED: NEVER
HOW OFTEN DURING THE LAST YEAR HAVE YOU BEEN UNABLE TO REMEMBER WHAT HAPPENED THE NIGHT BEFORE BECAUSE YOU HAD BEEN DRINKING: NEVER
HOW OFTEN DURING THE LAST YEAR HAVE YOU HAD A FEELING OF GUILT OR REMORSE AFTER DRINKING: NEVER
HAS A RELATIVE, FRIEND, DOCTOR, OR ANOTHER HEALTH PROFESSIONAL EXPRESSED CONCERN ABOUT YOUR DRINKING OR SUGGESTED YOU CUT DOWN: NO
HOW OFTEN DURING THE LAST YEAR HAVE YOU HAD A FEELING OF GUILT OR REMORSE AFTER DRINKING: NEVER
HOW OFTEN DURING THE LAST YEAR HAVE YOU NEEDED AN ALCOHOLIC DRINK FIRST THING IN THE MORNING TO GET YOURSELF GOING AFTER A NIGHT OF HEAVY DRINKING: NEVER
HOW MANY STANDARD DRINKS CONTAINING ALCOHOL DO YOU HAVE ON A TYPICAL DAY: 1 OR 2
HOW OFTEN DURING THE LAST YEAR HAVE YOU NEEDED AN ALCOHOLIC DRINK FIRST THING IN THE MORNING TO GET YOURSELF GOING AFTER A NIGHT OF HEAVY DRINKING: NEVER
HOW OFTEN DO YOU HAVE A DRINK CONTAINING ALCOHOL: 5
HOW OFTEN DURING THE LAST YEAR HAVE YOU FAILED TO DO WHAT WAS NORMALLY EXPECTED FROM YOU BECAUSE OF DRINKING: NEVER
HOW MANY STANDARD DRINKS CONTAINING ALCOHOL DO YOU HAVE ON A TYPICAL DAY: 1
HAVE YOU OR SOMEONE ELSE BEEN INJURED AS A RESULT OF YOUR DRINKING: NO
HOW OFTEN DO YOU HAVE SIX OR MORE DRINKS ON ONE OCCASION: 1
HOW OFTEN DURING THE LAST YEAR HAVE YOU FAILED TO DO WHAT WAS NORMALLY EXPECTED FROM YOU BECAUSE OF DRINKING: NEVER
HOW OFTEN DURING THE LAST YEAR HAVE YOU FOUND THAT YOU WERE NOT ABLE TO STOP DRINKING ONCE YOU HAD STARTED: NEVER

## 2024-03-15 ASSESSMENT — PATIENT HEALTH QUESTIONNAIRE - PHQ9
SUM OF ALL RESPONSES TO PHQ9 QUESTIONS 1 & 2: 0
SUM OF ALL RESPONSES TO PHQ QUESTIONS 1-9: 0
1. LITTLE INTEREST OR PLEASURE IN DOING THINGS: NOT AT ALL
SUM OF ALL RESPONSES TO PHQ QUESTIONS 1-9: 0
2. FEELING DOWN, DEPRESSED OR HOPELESS: NOT AT ALL
SUM OF ALL RESPONSES TO PHQ QUESTIONS 1-9: 0
SUM OF ALL RESPONSES TO PHQ QUESTIONS 1-9: 0

## 2024-03-18 ENCOUNTER — OFFICE VISIT (OUTPATIENT)
Dept: FAMILY MEDICINE CLINIC | Facility: CLINIC | Age: 74
End: 2024-03-18
Payer: MEDICARE

## 2024-03-18 VITALS
BODY MASS INDEX: 30.94 KG/M2 | DIASTOLIC BLOOD PRESSURE: 74 MMHG | WEIGHT: 221 LBS | HEIGHT: 71 IN | SYSTOLIC BLOOD PRESSURE: 120 MMHG

## 2024-03-18 DIAGNOSIS — Z87.891 PERSONAL HISTORY OF TOBACCO USE: ICD-10-CM

## 2024-03-18 DIAGNOSIS — I48.0 HYPERCOAGULABLE STATE DUE TO PAROXYSMAL ATRIAL FIBRILLATION (HCC): ICD-10-CM

## 2024-03-18 DIAGNOSIS — Z12.5 SCREENING FOR PROSTATE CANCER: ICD-10-CM

## 2024-03-18 DIAGNOSIS — E78.5 ELEVATED LIPIDS: ICD-10-CM

## 2024-03-18 DIAGNOSIS — D68.69 HYPERCOAGULABLE STATE DUE TO PAROXYSMAL ATRIAL FIBRILLATION (HCC): ICD-10-CM

## 2024-03-18 DIAGNOSIS — Z00.00 ENCOUNTER FOR SUBSEQUENT ANNUAL WELLNESS VISIT (AWV) IN MEDICARE PATIENT: Primary | ICD-10-CM

## 2024-03-18 DIAGNOSIS — I48.0 PAROXYSMAL ATRIAL FIBRILLATION (HCC): ICD-10-CM

## 2024-03-18 DIAGNOSIS — J00 NASOPHARYNGITIS: ICD-10-CM

## 2024-03-18 DIAGNOSIS — I10 PRIMARY HYPERTENSION: ICD-10-CM

## 2024-03-18 PROBLEM — J96.01 ACUTE RESPIRATORY FAILURE WITH HYPOXIA (HCC): Status: RESOLVED | Noted: 2023-02-27 | Resolved: 2024-03-18

## 2024-03-18 LAB
ALBUMIN SERPL-MCNC: 3.9 G/DL (ref 3.2–4.6)
ALBUMIN/GLOB SERPL: 1.4 (ref 0.4–1.6)
ALP SERPL-CCNC: 77 U/L (ref 50–136)
ALT SERPL-CCNC: 32 U/L (ref 12–65)
ANION GAP SERPL CALC-SCNC: 4 MMOL/L (ref 2–11)
APPEARANCE UR: CLEAR
AST SERPL-CCNC: 18 U/L (ref 15–37)
BASOPHILS # BLD: 0 K/UL (ref 0–0.2)
BASOPHILS NFR BLD: 0 % (ref 0–2)
BILIRUB SERPL-MCNC: 0.5 MG/DL (ref 0.2–1.1)
BILIRUB UR QL: NEGATIVE
BUN SERPL-MCNC: 21 MG/DL (ref 8–23)
CALCIUM SERPL-MCNC: 9 MG/DL (ref 8.3–10.4)
CHLORIDE SERPL-SCNC: 106 MMOL/L (ref 103–113)
CHOLEST SERPL-MCNC: 178 MG/DL
CO2 SERPL-SCNC: 27 MMOL/L (ref 21–32)
COLOR UR: NORMAL
CREAT SERPL-MCNC: 1.2 MG/DL (ref 0.8–1.5)
DIFFERENTIAL METHOD BLD: NORMAL
EOSINOPHIL # BLD: 0.2 K/UL (ref 0–0.8)
EOSINOPHIL NFR BLD: 2 % (ref 0.5–7.8)
ERYTHROCYTE [DISTWIDTH] IN BLOOD BY AUTOMATED COUNT: 12.7 % (ref 11.9–14.6)
GLOBULIN SER CALC-MCNC: 2.7 G/DL (ref 2.8–4.5)
GLUCOSE SERPL-MCNC: 107 MG/DL (ref 65–100)
GLUCOSE UR STRIP.AUTO-MCNC: NEGATIVE MG/DL
HCT VFR BLD AUTO: 49.8 % (ref 41.1–50.3)
HDLC SERPL-MCNC: 52 MG/DL (ref 40–60)
HDLC SERPL: 3.4
HGB BLD-MCNC: 16.5 G/DL (ref 13.6–17.2)
HGB UR QL STRIP: NEGATIVE
IMM GRANULOCYTES # BLD AUTO: 0 K/UL (ref 0–0.5)
IMM GRANULOCYTES NFR BLD AUTO: 0 % (ref 0–5)
KETONES UR QL STRIP.AUTO: NEGATIVE MG/DL
LDLC SERPL CALC-MCNC: 107.4 MG/DL
LEUKOCYTE ESTERASE UR QL STRIP.AUTO: NEGATIVE
LYMPHOCYTES # BLD: 1.5 K/UL (ref 0.5–4.6)
LYMPHOCYTES NFR BLD: 14 % (ref 13–44)
MCH RBC QN AUTO: 31.1 PG (ref 26.1–32.9)
MCHC RBC AUTO-ENTMCNC: 33.1 G/DL (ref 31.4–35)
MCV RBC AUTO: 94 FL (ref 82–102)
MONOCYTES # BLD: 1.2 K/UL (ref 0.1–1.3)
MONOCYTES NFR BLD: 12 % (ref 4–12)
NEUTS SEG # BLD: 7.5 K/UL (ref 1.7–8.2)
NEUTS SEG NFR BLD: 72 % (ref 43–78)
NITRITE UR QL STRIP.AUTO: NEGATIVE
NRBC # BLD: 0 K/UL (ref 0–0.2)
PH UR STRIP: 7 (ref 5–9)
PLATELET # BLD AUTO: 184 K/UL (ref 150–450)
PMV BLD AUTO: 11.6 FL (ref 9.4–12.3)
POTASSIUM SERPL-SCNC: 4.3 MMOL/L (ref 3.5–5.1)
PROT SERPL-MCNC: 6.6 G/DL (ref 6.3–8.2)
PROT UR STRIP-MCNC: NEGATIVE MG/DL
PSA SERPL-MCNC: 2.9 NG/ML
RBC # BLD AUTO: 5.3 M/UL (ref 4.23–5.6)
SODIUM SERPL-SCNC: 137 MMOL/L (ref 136–146)
SP GR UR REFRACTOMETRY: 1.01 (ref 1–1.02)
TRIGL SERPL-MCNC: 93 MG/DL (ref 35–150)
TSH, 3RD GENERATION: 2.74 UIU/ML (ref 0.36–3.74)
UROBILINOGEN UR QL STRIP.AUTO: 0.2 EU/DL (ref 0.2–1)
VLDLC SERPL CALC-MCNC: 18.6 MG/DL (ref 6–23)
WBC # BLD AUTO: 10.4 K/UL (ref 4.3–11.1)

## 2024-03-18 PROCEDURE — 3078F DIAST BP <80 MM HG: CPT | Performed by: FAMILY MEDICINE

## 2024-03-18 PROCEDURE — 99214 OFFICE O/P EST MOD 30 MIN: CPT | Performed by: FAMILY MEDICINE

## 2024-03-18 PROCEDURE — G8417 CALC BMI ABV UP PARAM F/U: HCPCS | Performed by: FAMILY MEDICINE

## 2024-03-18 PROCEDURE — 1036F TOBACCO NON-USER: CPT | Performed by: FAMILY MEDICINE

## 2024-03-18 PROCEDURE — G8427 DOCREV CUR MEDS BY ELIG CLIN: HCPCS | Performed by: FAMILY MEDICINE

## 2024-03-18 PROCEDURE — G0296 VISIT TO DETERM LDCT ELIG: HCPCS | Performed by: FAMILY MEDICINE

## 2024-03-18 PROCEDURE — G0439 PPPS, SUBSEQ VISIT: HCPCS | Performed by: FAMILY MEDICINE

## 2024-03-18 PROCEDURE — G8484 FLU IMMUNIZE NO ADMIN: HCPCS | Performed by: FAMILY MEDICINE

## 2024-03-18 PROCEDURE — 3017F COLORECTAL CA SCREEN DOC REV: CPT | Performed by: FAMILY MEDICINE

## 2024-03-18 PROCEDURE — 1123F ACP DISCUSS/DSCN MKR DOCD: CPT | Performed by: FAMILY MEDICINE

## 2024-03-18 PROCEDURE — 3074F SYST BP LT 130 MM HG: CPT | Performed by: FAMILY MEDICINE

## 2024-03-18 RX ORDER — AZITHROMYCIN 250 MG/1
250 TABLET, FILM COATED ORAL SEE ADMIN INSTRUCTIONS
Qty: 6 TABLET | Refills: 0 | Status: SHIPPED | OUTPATIENT
Start: 2024-03-18 | End: 2024-03-23

## 2024-03-18 RX ORDER — LISINOPRIL 10 MG/1
10 TABLET ORAL DAILY
Qty: 90 TABLET | Refills: 3 | Status: SHIPPED | OUTPATIENT
Start: 2024-03-18

## 2024-03-18 RX ORDER — BROMPHENIRAMINE MALEATE, PSEUDOEPHEDRINE HYDROCHLORIDE, AND DEXTROMETHORPHAN HYDROBROMIDE 2; 30; 10 MG/5ML; MG/5ML; MG/5ML
5 SYRUP ORAL 4 TIMES DAILY PRN
Qty: 180 ML | Refills: 0 | Status: SHIPPED | OUTPATIENT
Start: 2024-03-18

## 2024-03-18 ASSESSMENT — ENCOUNTER SYMPTOMS
COUGH: 1
SHORTNESS OF BREATH: 0
CHEST TIGHTNESS: 0
STRIDOR: 0
SINUS PAIN: 0
SINUS PRESSURE: 0
RHINORRHEA: 1
APNEA: 0
GASTROINTESTINAL NEGATIVE: 1
EYES NEGATIVE: 1
TROUBLE SWALLOWING: 0
SORE THROAT: 1
WHEEZING: 0

## 2024-03-18 NOTE — PROGRESS NOTES
HISTORY OF PRESENT ILLNESS    Lalo Sharp is a 73 y.o. male.  HPI  Chief Complaint   Patient presents with    Medicare AWV     See above. Stable on maintenance medications.  Followed by cardiology for PAF; on blood thinner. Has been stable.  No side effects from BP medication. No headache or vision change. Denies chest pain or SOB. No swelling.  Onset 3 days ago of sore throat; sinus congestion and cough. No fever. Mucus drainage in dark.      Current Outpatient Medications on File Prior to Visit   Medication Sig Dispense Refill    Naproxen Sodium (ALEVE PO) Take by mouth as needed      Multiple Vitamins-Minerals (CENTRUM ADULTS PO) Take by mouth daily      apixaban (ELIQUIS) 5 MG TABS tablet Take 1 tablet by mouth 2 times daily 60 tablet 11    dilTIAZem (CARDIZEM CD) 240 MG extended release capsule Take 1 capsule by mouth daily 30 capsule 11    albuterol (PROVENTIL) (2.5 MG/3ML) 0.083% nebulizer solution Take 3 mLs by nebulization every 6 hours while awake (Patient taking differently: Take 3 mLs by nebulization every 6 hours as needed) 120 each 0     No current facility-administered medications on file prior to visit.     Past Medical History:   Diagnosis Date    Allergy desensitization therapy     receives injections every other week    Asthma 2010    inhaler prn, hasn't needed for several years    Atrial fibrillation (HCC) 2023    PAF    Backache, unspecified 10/24/2016    Cervicalgia 10/24/2016    H/O complete eye exam Fall 2019    Dr. Cortez, [facility]    Hyperlipidemia     Hypertension     medication    Pain in joint, multiple sites 10/24/2016    Positive HOMERO (antinuclear antibody) 10/24/2016    Work-up done with Rheumatology, negative work up    Shingles 1990's    hospitalized for 6 days    Toe fracture, left 2019    5th metatarcel       Review of Systems   Constitutional: Negative.    HENT:  Positive for congestion, postnasal drip, rhinorrhea and sore throat. Negative for ear pain, mouth

## 2024-03-20 ENCOUNTER — OFFICE VISIT (OUTPATIENT)
Age: 74
End: 2024-03-20
Payer: MEDICARE

## 2024-03-20 ENCOUNTER — PATIENT MESSAGE (OUTPATIENT)
Dept: FAMILY MEDICINE CLINIC | Facility: CLINIC | Age: 74
End: 2024-03-20

## 2024-03-20 VITALS
WEIGHT: 224 LBS | BODY MASS INDEX: 31.36 KG/M2 | HEART RATE: 90 BPM | SYSTOLIC BLOOD PRESSURE: 118 MMHG | DIASTOLIC BLOOD PRESSURE: 76 MMHG | HEIGHT: 71 IN

## 2024-03-20 DIAGNOSIS — I10 ESSENTIAL HYPERTENSION: ICD-10-CM

## 2024-03-20 DIAGNOSIS — E87.1 HYPONATREMIA: ICD-10-CM

## 2024-03-20 DIAGNOSIS — I48.0 PAROXYSMAL ATRIAL FIBRILLATION (HCC): Primary | ICD-10-CM

## 2024-03-20 DIAGNOSIS — E78.2 MIXED HYPERLIPIDEMIA: ICD-10-CM

## 2024-03-20 PROCEDURE — G8484 FLU IMMUNIZE NO ADMIN: HCPCS | Performed by: INTERNAL MEDICINE

## 2024-03-20 PROCEDURE — 3017F COLORECTAL CA SCREEN DOC REV: CPT | Performed by: INTERNAL MEDICINE

## 2024-03-20 PROCEDURE — 1036F TOBACCO NON-USER: CPT | Performed by: INTERNAL MEDICINE

## 2024-03-20 PROCEDURE — 99214 OFFICE O/P EST MOD 30 MIN: CPT | Performed by: INTERNAL MEDICINE

## 2024-03-20 PROCEDURE — 3074F SYST BP LT 130 MM HG: CPT | Performed by: INTERNAL MEDICINE

## 2024-03-20 PROCEDURE — 3078F DIAST BP <80 MM HG: CPT | Performed by: INTERNAL MEDICINE

## 2024-03-20 PROCEDURE — G8427 DOCREV CUR MEDS BY ELIG CLIN: HCPCS | Performed by: INTERNAL MEDICINE

## 2024-03-20 PROCEDURE — 1123F ACP DISCUSS/DSCN MKR DOCD: CPT | Performed by: INTERNAL MEDICINE

## 2024-03-20 PROCEDURE — 93000 ELECTROCARDIOGRAM COMPLETE: CPT | Performed by: INTERNAL MEDICINE

## 2024-03-20 PROCEDURE — G8417 CALC BMI ABV UP PARAM F/U: HCPCS | Performed by: INTERNAL MEDICINE

## 2024-03-20 ASSESSMENT — ENCOUNTER SYMPTOMS
COUGH: 0
SHORTNESS OF BREATH: 0
RESPIRATORY NEGATIVE: 1

## 2024-03-22 RX ORDER — CIPROFLOXACIN/HYDROCORTISONE 0.2 %-1 %
SUSPENSION, DROPS(FINAL DOSAGE FORM)(ML) OTIC (EAR)
Qty: 5 ML | Refills: 0 | Status: SHIPPED | OUTPATIENT
Start: 2024-03-22

## 2024-03-22 NOTE — TELEPHONE ENCOUNTER
From: Lalo Sharp  Sent: 3/22/2024 8:42 AM EDT  To: Cathie Buitrago MA  Subject: RE: Ear Ache    Cipro Otic #5ml 4 drops in affected ear tid  ----- Message -----  From: Cathie Jiang MA  Sent: 3/22/2024 8:29 AM EDT  To: Woodrow Sood Jr., MD  Subject: FW: Ear Ache       ----- Message -----  From: Lalo Sharp \"Gene\"  Sent: 3/21/2024 4:35 PM EDT  To: *  Subject: Ear Ache     Please let me know if you have sent a Rx for my ear ache.  Thank you,  Miki Sharp

## 2024-03-25 ENCOUNTER — HOSPITAL ENCOUNTER (OUTPATIENT)
Dept: CT IMAGING | Age: 74
Discharge: HOME | End: 2024-03-28
Attending: FAMILY MEDICINE
Payer: MEDICARE

## 2024-03-25 DIAGNOSIS — Z87.891 PERSONAL HISTORY OF TOBACCO USE: ICD-10-CM

## 2024-03-25 PROCEDURE — 71271 CT THORAX LUNG CANCER SCR C-: CPT

## 2024-03-26 ENCOUNTER — PATIENT MESSAGE (OUTPATIENT)
Age: 74
End: 2024-03-26

## 2024-03-26 DIAGNOSIS — I10 ESSENTIAL HYPERTENSION: ICD-10-CM

## 2024-03-26 DIAGNOSIS — I48.0 PAROXYSMAL ATRIAL FIBRILLATION (HCC): ICD-10-CM

## 2024-03-26 RX ORDER — DILTIAZEM HYDROCHLORIDE 240 MG/1
240 CAPSULE, COATED, EXTENDED RELEASE ORAL DAILY
Qty: 90 CAPSULE | Refills: 3 | Status: SHIPPED | OUTPATIENT
Start: 2024-03-26

## 2024-03-26 NOTE — TELEPHONE ENCOUNTER
Requested Prescriptions     Pending Prescriptions Disp Refills    dilTIAZem (CARDIZEM CD) 240 MG extended release capsule 90 capsule 3     Sig: Take 1 capsule by mouth daily     Rx verified last ov 3/20/24

## 2024-04-01 ENCOUNTER — OFFICE VISIT (OUTPATIENT)
Dept: FAMILY MEDICINE CLINIC | Facility: CLINIC | Age: 74
End: 2024-04-01
Payer: MEDICARE

## 2024-04-01 VITALS — BODY MASS INDEX: 31.26 KG/M2 | HEART RATE: 90 BPM | WEIGHT: 224 LBS | OXYGEN SATURATION: 93 % | TEMPERATURE: 97.9 F

## 2024-04-01 DIAGNOSIS — H69.92 ACUTE DYSFUNCTION OF LEFT EUSTACHIAN TUBE: Primary | ICD-10-CM

## 2024-04-01 PROCEDURE — 3017F COLORECTAL CA SCREEN DOC REV: CPT | Performed by: FAMILY MEDICINE

## 2024-04-01 PROCEDURE — 1036F TOBACCO NON-USER: CPT | Performed by: FAMILY MEDICINE

## 2024-04-01 PROCEDURE — 1123F ACP DISCUSS/DSCN MKR DOCD: CPT | Performed by: FAMILY MEDICINE

## 2024-04-01 PROCEDURE — 99213 OFFICE O/P EST LOW 20 MIN: CPT | Performed by: FAMILY MEDICINE

## 2024-04-01 PROCEDURE — G8427 DOCREV CUR MEDS BY ELIG CLIN: HCPCS | Performed by: FAMILY MEDICINE

## 2024-04-01 PROCEDURE — G8417 CALC BMI ABV UP PARAM F/U: HCPCS | Performed by: FAMILY MEDICINE

## 2024-04-01 RX ORDER — AZELASTINE 1 MG/ML
2 SPRAY, METERED NASAL 2 TIMES DAILY
Qty: 60 ML | Refills: 0 | Status: SHIPPED | OUTPATIENT
Start: 2024-04-01

## 2024-04-01 RX ORDER — ACETAMINOPHEN, CHLORPHENIRAMINE MALEATE, AND PHENYLEPHRINE HCL 325; 4; 10 MG/1; MG/1; MG/1
TABLET, MULTILAYER ORAL
Qty: 30 TABLET | Refills: 0 | Status: SHIPPED | OUTPATIENT
Start: 2024-04-01

## 2024-04-01 RX ORDER — PREDNISONE 10 MG/1
1 TABLET ORAL SEE ADMIN INSTRUCTIONS
Qty: 1 EACH | Refills: 0 | Status: SHIPPED | OUTPATIENT
Start: 2024-04-01

## 2024-04-01 SDOH — ECONOMIC STABILITY: FOOD INSECURITY: WITHIN THE PAST 12 MONTHS, THE FOOD YOU BOUGHT JUST DIDN'T LAST AND YOU DIDN'T HAVE MONEY TO GET MORE.: NEVER TRUE

## 2024-04-01 SDOH — ECONOMIC STABILITY: INCOME INSECURITY: HOW HARD IS IT FOR YOU TO PAY FOR THE VERY BASICS LIKE FOOD, HOUSING, MEDICAL CARE, AND HEATING?: NOT HARD AT ALL

## 2024-04-01 SDOH — ECONOMIC STABILITY: FOOD INSECURITY: WITHIN THE PAST 12 MONTHS, YOU WORRIED THAT YOUR FOOD WOULD RUN OUT BEFORE YOU GOT MONEY TO BUY MORE.: NEVER TRUE

## 2024-04-01 ASSESSMENT — PATIENT HEALTH QUESTIONNAIRE - PHQ9
SUM OF ALL RESPONSES TO PHQ QUESTIONS 1-9: 0
SUM OF ALL RESPONSES TO PHQ9 QUESTIONS 1 & 2: 0
2. FEELING DOWN, DEPRESSED OR HOPELESS: NOT AT ALL
1. LITTLE INTEREST OR PLEASURE IN DOING THINGS: NOT AT ALL
SUM OF ALL RESPONSES TO PHQ QUESTIONS 1-9: 0

## 2024-04-01 ASSESSMENT — ENCOUNTER SYMPTOMS
GASTROINTESTINAL NEGATIVE: 1
EYES NEGATIVE: 1
SINUS PRESSURE: 0
TROUBLE SWALLOWING: 0
RESPIRATORY NEGATIVE: 1
RHINORRHEA: 0
SORE THROAT: 0
FACIAL SWELLING: 0

## 2024-04-01 NOTE — PROGRESS NOTES
HISTORY OF PRESENT ILLNESS    Lalo Sharp is a 73 y.o. male.  HPI  Chief Complaint   Patient presents with    Hearing Loss     L      See above. Recent URI symptoms have resolved but has left him with decreased hearing on left ear. Feels like it needs to pop. No pain. Denies dizziness. Sinus congestion resolved. No fever or sore throat.      Current Outpatient Medications on File Prior to Visit   Medication Sig Dispense Refill    neomycin-polymyxin-hydrocortisone (CORTISPORIN) 3.5-41898-6 otic solution 3 drops 4 times daily      dilTIAZem (CARDIZEM CD) 240 MG extended release capsule Take 1 capsule by mouth daily 90 capsule 3    lisinopril (PRINIVIL;ZESTRIL) 10 MG tablet Take 1 tablet by mouth daily 90 tablet 3    Naproxen Sodium (ALEVE PO) Take by mouth as needed      Multiple Vitamins-Minerals (CENTRUM ADULTS PO) Take by mouth daily      apixaban (ELIQUIS) 5 MG TABS tablet Take 1 tablet by mouth 2 times daily 60 tablet 11    albuterol (PROVENTIL) (2.5 MG/3ML) 0.083% nebulizer solution Take 3 mLs by nebulization every 6 hours while awake (Patient taking differently: Take 3 mLs by nebulization every 6 hours as needed) 120 each 0     No current facility-administered medications on file prior to visit.     Past Medical History:   Diagnosis Date    Allergy desensitization therapy     receives injections every other week    Asthma 2010    inhaler prn, hasn't needed for several years    Atrial fibrillation (HCC) 2023    PAF    Backache, unspecified 10/24/2016    Cervicalgia 10/24/2016    H/O complete eye exam Fall 2019    [facility]. [facility], [facility]    Hyperlipidemia     Hypertension     medication    Pain in joint, multiple sites 10/24/2016    Positive HOMERO (antinuclear antibody) 10/24/2016    Work-up done with Rheumatology, negative work up    Shingles 1990's    hospitalized for 6 days    Toe fracture, left 2019    5th metatarcel       Review of Systems   Constitutional: Negative.    HENT:  Positive for

## 2024-04-05 ENCOUNTER — PATIENT MESSAGE (OUTPATIENT)
Age: 74
End: 2024-04-05

## 2024-04-05 DIAGNOSIS — I48.0 PAROXYSMAL ATRIAL FIBRILLATION (HCC): ICD-10-CM

## 2024-04-05 DIAGNOSIS — I10 ESSENTIAL HYPERTENSION: ICD-10-CM

## 2024-04-05 NOTE — TELEPHONE ENCOUNTER
Requested Prescriptions     Pending Prescriptions Disp Refills    apixaban (ELIQUIS) 5 MG TABS tablet 60 tablet 11     Sig: Take 1 tablet by mouth 2 times daily     Rx verified last ov 3/20/24

## 2024-04-15 ENCOUNTER — OFFICE VISIT (OUTPATIENT)
Dept: ENT CLINIC | Age: 74
End: 2024-04-15
Payer: MEDICARE

## 2024-04-15 VITALS
BODY MASS INDEX: 30.52 KG/M2 | DIASTOLIC BLOOD PRESSURE: 68 MMHG | SYSTOLIC BLOOD PRESSURE: 122 MMHG | RESPIRATION RATE: 17 BRPM | HEIGHT: 71 IN | WEIGHT: 218 LBS

## 2024-04-15 DIAGNOSIS — H73.892 TYMPANIC MEMBRANE RETRACTION, LEFT: ICD-10-CM

## 2024-04-15 DIAGNOSIS — H69.92 ETD (EUSTACHIAN TUBE DYSFUNCTION), LEFT: Primary | ICD-10-CM

## 2024-04-15 PROCEDURE — G8417 CALC BMI ABV UP PARAM F/U: HCPCS | Performed by: STUDENT IN AN ORGANIZED HEALTH CARE EDUCATION/TRAINING PROGRAM

## 2024-04-15 PROCEDURE — 3017F COLORECTAL CA SCREEN DOC REV: CPT | Performed by: STUDENT IN AN ORGANIZED HEALTH CARE EDUCATION/TRAINING PROGRAM

## 2024-04-15 PROCEDURE — 1123F ACP DISCUSS/DSCN MKR DOCD: CPT | Performed by: STUDENT IN AN ORGANIZED HEALTH CARE EDUCATION/TRAINING PROGRAM

## 2024-04-15 PROCEDURE — G8427 DOCREV CUR MEDS BY ELIG CLIN: HCPCS | Performed by: STUDENT IN AN ORGANIZED HEALTH CARE EDUCATION/TRAINING PROGRAM

## 2024-04-15 PROCEDURE — 99203 OFFICE O/P NEW LOW 30 MIN: CPT | Performed by: STUDENT IN AN ORGANIZED HEALTH CARE EDUCATION/TRAINING PROGRAM

## 2024-04-15 PROCEDURE — 1036F TOBACCO NON-USER: CPT | Performed by: STUDENT IN AN ORGANIZED HEALTH CARE EDUCATION/TRAINING PROGRAM

## 2024-04-15 NOTE — PROGRESS NOTES
HPI:  Lalo Sharp is a 73 y.o. male seen New    Chief Complaint   Patient presents with    Ear Problem     Patient presents today with c/o L side ear fullness / clogged sensation . Patient has taken oral abx , azelastine nasal spray , and antihistamines . Patient states that he has has some improvement but continues to have occasional popping sensation .        73-year-old male seen for a new patient referral evaluation with concerns of his left ear.  He feels as if he has had ongoing clogged ear fullness sensation for the last month or so.  He has been on azelastine nasal spray oral antihistamines and a recent antibiotic.  There has been no associated otalgia otorrhea and minimal dizziness or vertigo.  Symptoms have significantly improved over the last week or so but he continues to have a popping sensation.  Denies any recent URI events outside of a recent spring pollen season.    Past Medical History, Past Surgical History, Family history, Social History, and Medications were all reviewed with the patient today and updated as necessary.     Allergies   Allergen Reactions    Cipro [Ciprofloxacin Hcl] Hives       Patient Active Problem List   Diagnosis    Cervicalgia    Positive HOMEOR (antinuclear antibody)    Pain in joint, multiple sites    Acute asthma exacerbation    Hyponatremia    Atrial fibrillation with rapid ventricular response (HCC)    Mild bibasilar atelectasis    Hypercoagulable state due to paroxysmal atrial fibrillation (HCC)    Class 1 obesity due to excess calories with serious comorbidity and body mass index (BMI) of 33.0 to 33.9 in adult       Current Outpatient Medications   Medication Sig    apixaban (ELIQUIS) 5 MG TABS tablet Take 1 tablet by mouth 2 times daily    neomycin-polymyxin-hydrocortisone (CORTISPORIN) 3.5-56469-7 otic solution 3 drops 4 times daily    Chlorphen-PE-Acetaminophen (NOREL AD) 4- MG TABS 1 po q8h prn congestion    predniSONE 10 MG (21) TBPK Take 1 dose pack

## 2024-05-24 ENCOUNTER — PATIENT MESSAGE (OUTPATIENT)
Dept: FAMILY MEDICINE CLINIC | Facility: CLINIC | Age: 74
End: 2024-05-24

## 2024-05-24 DIAGNOSIS — M25.50 PAIN IN JOINT, MULTIPLE SITES: Primary | ICD-10-CM

## 2024-05-24 NOTE — TELEPHONE ENCOUNTER
From: Lalo Sharp  To: Dr. Woodrow Sood  Sent: 5/24/2024 9:33 AM EDT  Subject: Muscle / joint soreness and stiffness    Dr. Sood,  Withing the past 3 weeks, I have experienced significant soreness, starting in my shoulders and now in my hips and thighs. It is causing me some concerns. It is affecting my sleep and is becoming uncomfortable. Think I need to come in to see you and determine if I need to see a rheumatologist. Please let me know.  Thanks,  Miki Sharp

## 2024-06-10 ENCOUNTER — OFFICE VISIT (OUTPATIENT)
Dept: FAMILY MEDICINE CLINIC | Facility: CLINIC | Age: 74
End: 2024-06-10

## 2024-06-10 VITALS
TEMPERATURE: 97.4 F | WEIGHT: 217 LBS | DIASTOLIC BLOOD PRESSURE: 70 MMHG | OXYGEN SATURATION: 95 % | BODY MASS INDEX: 30.28 KG/M2 | SYSTOLIC BLOOD PRESSURE: 132 MMHG | HEART RATE: 87 BPM

## 2024-06-10 DIAGNOSIS — M25.50 ARTHRALGIA, UNSPECIFIED JOINT: Primary | ICD-10-CM

## 2024-06-10 LAB — ERYTHROCYTE [SEDIMENTATION RATE] IN BLOOD: 22 MM/HR

## 2024-06-10 RX ORDER — KETOROLAC TROMETHAMINE 30 MG/ML
30 INJECTION, SOLUTION INTRAMUSCULAR; INTRAVENOUS ONCE
Status: COMPLETED | OUTPATIENT
Start: 2024-06-10 | End: 2024-06-10

## 2024-06-10 RX ORDER — METHYLPREDNISOLONE SODIUM SUCCINATE 40 MG/ML
40 INJECTION, POWDER, LYOPHILIZED, FOR SOLUTION INTRAMUSCULAR; INTRAVENOUS ONCE
Status: COMPLETED | OUTPATIENT
Start: 2024-06-10 | End: 2024-06-10

## 2024-06-10 RX ORDER — KETOROLAC TROMETHAMINE 10 MG/1
10 TABLET, FILM COATED ORAL EVERY 6 HOURS PRN
Qty: 20 TABLET | Refills: 1 | Status: SHIPPED | OUTPATIENT
Start: 2024-06-10 | End: 2025-06-10

## 2024-06-10 RX ORDER — PREDNISONE 10 MG/1
TABLET ORAL
Qty: 48 EACH | Refills: 0 | Status: SHIPPED | OUTPATIENT
Start: 2024-06-10

## 2024-06-10 RX ADMIN — KETOROLAC TROMETHAMINE 30 MG: 30 INJECTION, SOLUTION INTRAMUSCULAR; INTRAVENOUS at 13:43

## 2024-06-10 RX ADMIN — METHYLPREDNISOLONE SODIUM SUCCINATE 40 MG: 40 INJECTION, POWDER, LYOPHILIZED, FOR SOLUTION INTRAMUSCULAR; INTRAVENOUS at 13:44

## 2024-06-10 ASSESSMENT — ENCOUNTER SYMPTOMS
EYES NEGATIVE: 1
RESPIRATORY NEGATIVE: 1
GASTROINTESTINAL NEGATIVE: 1

## 2024-06-10 NOTE — PROGRESS NOTES
Marked tenderness with flexion and extension and with internal and with external rotation.  Neurovascular is intact.   Lymphadenopathy:      Cervical: No cervical adenopathy.   Psychiatric:         Mood and Affect: Mood normal.          ASSESSMENT and PLAN    Lalo was seen today for joint pain.    Diagnoses and all orders for this visit:    Arthralgia, unspecified joint  -     ketorolac (TORADOL) 10 MG tablet; Take 1 tablet by mouth every 6 hours as needed for Pain  -     HOMERO, Direct, w/Reflex; Future  -     Sedimentation Rate; Future  -     Antistreptolysin O screen; Future  -     Rheumatoid Factor; Future  -     Rheumatoid Factor  -     Antistreptolysin O screen  -     Sedimentation Rate  -     HOMERO, Direct, w/Reflex  -     methylPREDNISolone sodium succ (SOLU-MEDROL) injection 40 mg  -     ketorolac (TORADOL) injection 30 mg  -     predniSONE 10 MG (48) TBPK; Take as directed for 12 day Dosepak     Notified BP is stable.  Discussed history and medications with patient. Continue current measures. Follow up if side effects occur or if new symptoms develop.  Explained that serum sickness is likely and, if so, should be temporary. Follow up with rheumatologist as planned unless symptoms resolve entirely. Reviewed other potential causes. Notify lab results      Return in about 4 weeks (around 7/8/2024), or if symptoms worsen or fail to improve.    HODAN MORALES JR, MD

## 2024-06-11 LAB
RHEUMATOID FACT SER QL LA: POSITIVE
RHEUMATOID FACT TITR SER LA: NORMAL {TITER}

## 2024-06-12 LAB — ANA SER QL: NEGATIVE

## 2024-06-13 ENCOUNTER — TELEPHONE (OUTPATIENT)
Dept: FAMILY MEDICINE CLINIC | Facility: CLINIC | Age: 74
End: 2024-06-13

## 2024-06-13 DIAGNOSIS — M25.50 PAIN IN JOINT, MULTIPLE SITES: ICD-10-CM

## 2024-06-13 DIAGNOSIS — R70.0 ELEVATED SED RATE: ICD-10-CM

## 2024-06-13 DIAGNOSIS — R76.8 RHEUMATOID FACTOR POSITIVE: Primary | ICD-10-CM

## 2024-06-13 LAB — ASO AB SERPL-ACNC: <20 IU/ML (ref 0–200)

## 2024-06-13 NOTE — TELEPHONE ENCOUNTER
----- Message from Woodrow Sood Jr., MD sent at 6/11/2024 11:46 AM EDT -----  Notify positive for RA. Please see if another rheumatologist can see him sooner in light of lab result.  ----- Message -----  From: Jeffy Damon Incoming [place] [employer]  Sent: 6/10/2024  10:32 PM EDT  To: Woodrow Sood Jr., MD       3

## 2024-06-25 ENCOUNTER — PATIENT MESSAGE (OUTPATIENT)
Dept: FAMILY MEDICINE CLINIC | Facility: CLINIC | Age: 74
End: 2024-06-25

## 2024-06-25 DIAGNOSIS — M25.50 ARTHRALGIA, UNSPECIFIED JOINT: ICD-10-CM

## 2024-06-25 RX ORDER — KETOROLAC TROMETHAMINE 10 MG/1
10 TABLET, FILM COATED ORAL EVERY 6 HOURS PRN
Qty: 20 TABLET | Refills: 1 | Status: SHIPPED | OUTPATIENT
Start: 2024-06-25 | End: 2024-09-23

## 2024-06-25 RX ORDER — KETOROLAC TROMETHAMINE 10 MG/1
10 TABLET, FILM COATED ORAL EVERY 6 HOURS PRN
Qty: 20 TABLET | Refills: 1 | Status: CANCELLED | OUTPATIENT
Start: 2024-06-25 | End: 2025-06-25

## 2024-06-25 NOTE — TELEPHONE ENCOUNTER
From: Lalo Sharp  To: Dr. Woodrow Sood  Sent: 6/25/2024 7:07 AM EDT  Subject: Toradol / Ketolax 10MG for pain    Good morning,  Dr. Sood. I messed up and threw away the above Rx, thinking I would not need it. Well, I'm in some pain now that the Prednisone has been completed and is worn off. Will you please refill?  Thank you,  Miki Sharp

## 2024-07-16 ENCOUNTER — PATIENT MESSAGE (OUTPATIENT)
Dept: FAMILY MEDICINE CLINIC | Facility: CLINIC | Age: 74
End: 2024-07-16

## 2024-07-16 RX ORDER — PREDNISONE 10 MG/1
1 TABLET ORAL SEE ADMIN INSTRUCTIONS
Qty: 1 EACH | Refills: 0 | Status: SHIPPED | OUTPATIENT
Start: 2024-07-16

## 2024-09-23 ENCOUNTER — OFFICE VISIT (OUTPATIENT)
Age: 74
End: 2024-09-23
Payer: MEDICARE

## 2024-09-23 VITALS
WEIGHT: 216 LBS | HEART RATE: 80 BPM | SYSTOLIC BLOOD PRESSURE: 122 MMHG | HEIGHT: 71 IN | BODY MASS INDEX: 30.24 KG/M2 | DIASTOLIC BLOOD PRESSURE: 62 MMHG

## 2024-09-23 DIAGNOSIS — I48.0 PAROXYSMAL ATRIAL FIBRILLATION (HCC): ICD-10-CM

## 2024-09-23 DIAGNOSIS — E78.2 MIXED HYPERLIPIDEMIA: ICD-10-CM

## 2024-09-23 DIAGNOSIS — I10 ESSENTIAL HYPERTENSION: Primary | ICD-10-CM

## 2024-09-23 PROCEDURE — 99213 OFFICE O/P EST LOW 20 MIN: CPT | Performed by: INTERNAL MEDICINE

## 2024-09-23 PROCEDURE — G8417 CALC BMI ABV UP PARAM F/U: HCPCS | Performed by: INTERNAL MEDICINE

## 2024-09-23 PROCEDURE — G8428 CUR MEDS NOT DOCUMENT: HCPCS | Performed by: INTERNAL MEDICINE

## 2024-09-23 PROCEDURE — 3074F SYST BP LT 130 MM HG: CPT | Performed by: INTERNAL MEDICINE

## 2024-09-23 PROCEDURE — 3017F COLORECTAL CA SCREEN DOC REV: CPT | Performed by: INTERNAL MEDICINE

## 2024-09-23 PROCEDURE — 1123F ACP DISCUSS/DSCN MKR DOCD: CPT | Performed by: INTERNAL MEDICINE

## 2024-09-23 PROCEDURE — 1036F TOBACCO NON-USER: CPT | Performed by: INTERNAL MEDICINE

## 2024-09-23 PROCEDURE — 3078F DIAST BP <80 MM HG: CPT | Performed by: INTERNAL MEDICINE

## 2024-09-23 ASSESSMENT — ENCOUNTER SYMPTOMS
SHORTNESS OF BREATH: 0
COUGH: 0
HEMATOCHEZIA: 0

## 2024-11-19 ENCOUNTER — PATIENT MESSAGE (OUTPATIENT)
Dept: FAMILY MEDICINE CLINIC | Facility: CLINIC | Age: 74
End: 2024-11-19

## 2024-11-19 RX ORDER — BROMPHENIRAMINE MALEATE, PSEUDOEPHEDRINE HYDROCHLORIDE, AND DEXTROMETHORPHAN HYDROBROMIDE 2; 30; 10 MG/5ML; MG/5ML; MG/5ML
5 SYRUP ORAL 4 TIMES DAILY PRN
Qty: 180 ML | Refills: 0 | Status: SHIPPED | OUTPATIENT
Start: 2024-11-19

## 2024-11-19 RX ORDER — AZITHROMYCIN 250 MG/1
250 TABLET, FILM COATED ORAL SEE ADMIN INSTRUCTIONS
Qty: 6 TABLET | Refills: 0 | Status: SHIPPED | OUTPATIENT
Start: 2024-11-19 | End: 2024-11-24

## 2025-03-13 DIAGNOSIS — I10 PRIMARY HYPERTENSION: ICD-10-CM

## 2025-03-13 RX ORDER — LISINOPRIL 10 MG/1
10 TABLET ORAL DAILY
Qty: 90 TABLET | Refills: 0 | OUTPATIENT
Start: 2025-03-13

## 2025-03-13 SDOH — HEALTH STABILITY: PHYSICAL HEALTH: ON AVERAGE, HOW MANY MINUTES DO YOU ENGAGE IN EXERCISE AT THIS LEVEL?: 50 MIN

## 2025-03-13 SDOH — HEALTH STABILITY: PHYSICAL HEALTH: ON AVERAGE, HOW MANY DAYS PER WEEK DO YOU ENGAGE IN MODERATE TO STRENUOUS EXERCISE (LIKE A BRISK WALK)?: 7 DAYS

## 2025-03-13 ASSESSMENT — LIFESTYLE VARIABLES
HOW OFTEN DO YOU HAVE A DRINK CONTAINING ALCOHOL: 2-3 TIMES A WEEK
HOW OFTEN DO YOU HAVE A DRINK CONTAINING ALCOHOL: 4
HOW OFTEN DO YOU HAVE SIX OR MORE DRINKS ON ONE OCCASION: 1
HOW MANY STANDARD DRINKS CONTAINING ALCOHOL DO YOU HAVE ON A TYPICAL DAY: 1 OR 2
HOW MANY STANDARD DRINKS CONTAINING ALCOHOL DO YOU HAVE ON A TYPICAL DAY: 1

## 2025-03-13 ASSESSMENT — PATIENT HEALTH QUESTIONNAIRE - PHQ9
2. FEELING DOWN, DEPRESSED OR HOPELESS: NOT AT ALL
SUM OF ALL RESPONSES TO PHQ QUESTIONS 1-9: 0
SUM OF ALL RESPONSES TO PHQ QUESTIONS 1-9: 0
1. LITTLE INTEREST OR PLEASURE IN DOING THINGS: NOT AT ALL
SUM OF ALL RESPONSES TO PHQ QUESTIONS 1-9: 0
SUM OF ALL RESPONSES TO PHQ QUESTIONS 1-9: 0

## 2025-03-15 ENCOUNTER — PATIENT MESSAGE (OUTPATIENT)
Dept: FAMILY MEDICINE CLINIC | Facility: CLINIC | Age: 75
End: 2025-03-15

## 2025-03-15 DIAGNOSIS — I10 PRIMARY HYPERTENSION: ICD-10-CM

## 2025-03-17 RX ORDER — LISINOPRIL 10 MG/1
10 TABLET ORAL DAILY
Qty: 30 TABLET | Refills: 0 | Status: SHIPPED | OUTPATIENT
Start: 2025-03-17 | End: 2025-04-16

## 2025-03-17 RX ORDER — LISINOPRIL 10 MG/1
10 TABLET ORAL DAILY
Qty: 90 TABLET | Refills: 0 | OUTPATIENT
Start: 2025-03-17

## 2025-03-17 SDOH — ECONOMIC STABILITY: FOOD INSECURITY: WITHIN THE PAST 12 MONTHS, THE FOOD YOU BOUGHT JUST DIDN'T LAST AND YOU DIDN'T HAVE MONEY TO GET MORE.: NEVER TRUE

## 2025-03-17 SDOH — ECONOMIC STABILITY: INCOME INSECURITY: IN THE LAST 12 MONTHS, WAS THERE A TIME WHEN YOU WERE NOT ABLE TO PAY THE MORTGAGE OR RENT ON TIME?: NO

## 2025-03-17 SDOH — ECONOMIC STABILITY: FOOD INSECURITY: WITHIN THE PAST 12 MONTHS, YOU WORRIED THAT YOUR FOOD WOULD RUN OUT BEFORE YOU GOT MONEY TO BUY MORE.: NEVER TRUE

## 2025-03-17 SDOH — ECONOMIC STABILITY: TRANSPORTATION INSECURITY
IN THE PAST 12 MONTHS, HAS THE LACK OF TRANSPORTATION KEPT YOU FROM MEDICAL APPOINTMENTS OR FROM GETTING MEDICATIONS?: NO

## 2025-03-20 ENCOUNTER — OFFICE VISIT (OUTPATIENT)
Dept: FAMILY MEDICINE CLINIC | Facility: CLINIC | Age: 75
End: 2025-03-20

## 2025-03-20 VITALS
DIASTOLIC BLOOD PRESSURE: 80 MMHG | TEMPERATURE: 97.2 F | SYSTOLIC BLOOD PRESSURE: 122 MMHG | WEIGHT: 216.05 LBS | BODY MASS INDEX: 30.13 KG/M2 | OXYGEN SATURATION: 95 % | HEART RATE: 73 BPM

## 2025-03-20 DIAGNOSIS — Z12.5 SCREENING FOR PROSTATE CANCER: ICD-10-CM

## 2025-03-20 DIAGNOSIS — I48.0 PAROXYSMAL ATRIAL FIBRILLATION (HCC): ICD-10-CM

## 2025-03-20 DIAGNOSIS — M35.3 POLYMYALGIA RHEUMATICA: ICD-10-CM

## 2025-03-20 DIAGNOSIS — E78.5 ELEVATED LIPIDS: ICD-10-CM

## 2025-03-20 DIAGNOSIS — Z00.00 MEDICARE ANNUAL WELLNESS VISIT, SUBSEQUENT: ICD-10-CM

## 2025-03-20 DIAGNOSIS — Z00.00 ENCOUNTER FOR SUBSEQUENT ANNUAL WELLNESS VISIT (AWV) IN MEDICARE PATIENT: Primary | ICD-10-CM

## 2025-03-20 DIAGNOSIS — I10 PRIMARY HYPERTENSION: ICD-10-CM

## 2025-03-20 DIAGNOSIS — R68.82 DECREASED LIBIDO: ICD-10-CM

## 2025-03-20 LAB
ALBUMIN SERPL-MCNC: 3.9 G/DL (ref 3.2–4.6)
ALBUMIN/GLOB SERPL: 1.6 (ref 1–1.9)
ALP SERPL-CCNC: 60 U/L (ref 40–129)
ALT SERPL-CCNC: 29 U/L (ref 8–55)
ANION GAP SERPL CALC-SCNC: 14 MMOL/L (ref 7–16)
APPEARANCE UR: CLEAR
AST SERPL-CCNC: 25 U/L (ref 15–37)
BASOPHILS # BLD: 0.03 K/UL (ref 0–0.2)
BASOPHILS NFR BLD: 0.5 % (ref 0–2)
BILIRUB SERPL-MCNC: 0.4 MG/DL (ref 0–1.2)
BILIRUB UR QL: NEGATIVE
BUN SERPL-MCNC: 22 MG/DL (ref 8–23)
CALCIUM SERPL-MCNC: 9.1 MG/DL (ref 8.8–10.2)
CHLORIDE SERPL-SCNC: 102 MMOL/L (ref 98–107)
CHOLEST SERPL-MCNC: 186 MG/DL (ref 0–200)
CO2 SERPL-SCNC: 23 MMOL/L (ref 20–29)
COLOR UR: NORMAL
CREAT SERPL-MCNC: 1.28 MG/DL (ref 0.8–1.3)
DIFFERENTIAL METHOD BLD: NORMAL
EOSINOPHIL # BLD: 0.11 K/UL (ref 0–0.8)
EOSINOPHIL NFR BLD: 1.7 % (ref 0.5–7.8)
ERYTHROCYTE [DISTWIDTH] IN BLOOD BY AUTOMATED COUNT: 13.2 % (ref 11.9–14.6)
GLOBULIN SER CALC-MCNC: 2.4 G/DL (ref 2.3–3.5)
GLUCOSE SERPL-MCNC: 98 MG/DL (ref 70–99)
GLUCOSE UR STRIP.AUTO-MCNC: NEGATIVE MG/DL
HCT VFR BLD AUTO: 48 % (ref 41.1–50.3)
HDLC SERPL-MCNC: 42 MG/DL (ref 40–60)
HDLC SERPL: 4.4 (ref 0–5)
HGB BLD-MCNC: 16.3 G/DL (ref 13.6–17.2)
HGB UR QL STRIP: NEGATIVE
IMM GRANULOCYTES # BLD AUTO: 0.02 K/UL (ref 0–0.5)
IMM GRANULOCYTES NFR BLD AUTO: 0.3 % (ref 0–5)
KETONES UR QL STRIP.AUTO: NEGATIVE MG/DL
LDLC SERPL CALC-MCNC: 122 MG/DL (ref 0–100)
LEUKOCYTE ESTERASE UR QL STRIP.AUTO: NEGATIVE
LYMPHOCYTES # BLD: 1.29 K/UL (ref 0.5–4.6)
LYMPHOCYTES NFR BLD: 19.6 % (ref 13–44)
MCH RBC QN AUTO: 30.5 PG (ref 26.1–32.9)
MCHC RBC AUTO-ENTMCNC: 34 G/DL (ref 31.4–35)
MCV RBC AUTO: 89.7 FL (ref 82–102)
MONOCYTES # BLD: 0.65 K/UL (ref 0.1–1.3)
MONOCYTES NFR BLD: 9.9 % (ref 4–12)
NEUTS SEG # BLD: 4.47 K/UL (ref 1.7–8.2)
NEUTS SEG NFR BLD: 68 % (ref 43–78)
NITRITE UR QL STRIP.AUTO: NEGATIVE
NRBC # BLD: 0 K/UL (ref 0–0.2)
PH UR STRIP: 7.5 (ref 5–9)
PLATELET # BLD AUTO: 207 K/UL (ref 150–450)
PMV BLD AUTO: 11.3 FL (ref 9.4–12.3)
POTASSIUM SERPL-SCNC: 4.6 MMOL/L (ref 3.5–5.1)
PROT SERPL-MCNC: 6.3 G/DL (ref 6.3–8.2)
PROT UR STRIP-MCNC: NEGATIVE MG/DL
PSA SERPL-MCNC: 2.6 NG/ML (ref 0–4)
RBC # BLD AUTO: 5.35 M/UL (ref 4.23–5.6)
SODIUM SERPL-SCNC: 139 MMOL/L (ref 136–145)
SP GR UR REFRACTOMETRY: 1.02 (ref 1–1.02)
TRIGL SERPL-MCNC: 110 MG/DL (ref 0–150)
TSH, 3RD GENERATION: 2.64 UIU/ML (ref 0.27–4.2)
UROBILINOGEN UR QL STRIP.AUTO: 0.2 EU/DL (ref 0.2–1)
VLDLC SERPL CALC-MCNC: 22 MG/DL (ref 6–23)
WBC # BLD AUTO: 6.6 K/UL (ref 4.3–11.1)

## 2025-03-20 ASSESSMENT — ENCOUNTER SYMPTOMS
EYES NEGATIVE: 1
RESPIRATORY NEGATIVE: 1
GASTROINTESTINAL NEGATIVE: 1

## 2025-03-20 NOTE — PATIENT INSTRUCTIONS
Try to avoid secondhand smoke too.     Stay at a weight that's healthy for you. Talk to your doctor if you need help losing weight.     Try to get 7 to 9 hours of sleep each night.     Limit alcohol to 2 drinks a day for men and 1 drink a day for women. Too much alcohol can cause health problems.     Manage other health problems such as diabetes, high blood pressure, and high cholesterol. If you think you may have a problem with alcohol or drug use, talk to your doctor.   Medicines    Take your medicines exactly as prescribed. Call your doctor if you think you are having a problem with your medicine.     If your doctor recommends aspirin, take the amount directed each day. Make sure you take aspirin and not another kind of pain reliever, such as acetaminophen (Tylenol).   When should you call for help?   Call 911 if you have symptoms of a heart attack. These may include:    Chest pain or pressure, or a strange feeling in the chest.     Sweating.     Shortness of breath.     Pain, pressure, or a strange feeling in the back, neck, jaw, or upper belly or in one or both shoulders or arms.     Lightheadedness or sudden weakness.     A fast or irregular heartbeat.   After you call 911, the  may tell you to chew 1 adult-strength or 2 to 4 low-dose aspirin. Wait for an ambulance. Do not try to drive yourself.  Watch closely for changes in your health, and be sure to contact your doctor if you have any problems.  Where can you learn more?  Go to https://www.[employer].net/patientEd and enter F075 to learn more about \"A Healthy Heart: Care Instructions.\"  Current as of: July 31, 2024  Content Version: 14.4  © 6741-8278 [employer].   Care instructions adapted under license by [employer]. If you have questions about a medical condition or this instruction, always ask your healthcare professional. [facility], [facility], disclaims any warranty or liability for your use of this information.    Personalized

## 2025-03-20 NOTE — PROGRESS NOTES
see Patient Instructions/AVS.     Reviewed and updated this visit:  Allergies  Meds  Med Hx  Sexual Hx

## 2025-03-21 ENCOUNTER — RESULTS FOLLOW-UP (OUTPATIENT)
Dept: FAMILY MEDICINE CLINIC | Facility: CLINIC | Age: 75
End: 2025-03-21

## 2025-03-21 NOTE — TELEPHONE ENCOUNTER
----- Message from Dr. Woodrow Sood MD sent at 3/20/2025  4:26 PM EDT -----  Normal except slight decrease in kidney function. Increase water intake.  ----- Message -----  From: Jeffy Damon Incoming [employer]  Sent: 3/20/2025   2:39 PM EDT  To: Woodrow Sood Jr., MD

## 2025-03-22 LAB — TESTOST SERPL-MCNC: 497 NG/DL (ref 264–916)

## 2025-03-24 NOTE — PROGRESS NOTES
[facility] CARDIOLOGY Follow Up                 Reason for Visit: History of atrial    Subjective:     Patient is a 74 y.o. male with a PMH of atrial fibrillation, hypertension, hyperlipidemia, and polymyalgia rheumatica who presents for follow-up.  The patient was last seen by Dr. Turner in September 2024.  The patient had a TTE in February 2023 that was noted to demonstrate a normal EF.  The patient denies palpitations, angina and dyspnea.    Past Medical History:   Diagnosis Date    Allergy desensitization therapy     receives injections every other week    Asthma 2010    inhaler prn, hasn't needed for several years    Atrial fibrillation (HCC) 2023    PAF    Backache, unspecified 10/24/2016    Cervicalgia 10/24/2016    H/O complete eye exam Fall 2019    Dr. Cortez, [facility]    Hyperlipidemia     Hypertension     medication    Pain in joint, multiple sites 10/24/2016    Positive HOMERO (antinuclear antibody) 10/24/2016    Work-up done with Rheumatology, negative work up    Shingles 1990's    hospitalized for 6 days    Toe fracture, left 2019    5th metatarcel      Past Surgical History:   Procedure Laterality Date    COLONOSCOPY  2011    clear. Dr. Michele.    COLONOSCOPY N/A 9/26/2023    COLONOSCOPY POLYPECTOMY SNARE/COLD BIOPSY performed by Sonja Zepeda DO at [facility] ENDOSCOPY    CYST REMOVAL  1969    HEENT  1954    tonsils as a child    ORTHOPEDIC SURGERY  1980    knee arthroscopic    ORTHOPEDIC SURGERY  02/2018    right foot surgery    OTHER SURGICAL HISTORY      basal cell and squamous cell removed      Family History   Problem Relation Age of Onset    Heart Disease Paternal Grandfather     Asthma Paternal Grandfather     No Known Problems Brother     Hypertension Father     High Cholesterol Father     Prostate Cancer Father     Cancer Father 83        L    Heart Disease Father     Osteoarthritis Mother     Asthma Maternal Grandfather       Social History     Tobacco Use    Smoking status: Former

## 2025-03-26 ENCOUNTER — PATIENT MESSAGE (OUTPATIENT)
Dept: FAMILY MEDICINE CLINIC | Facility: CLINIC | Age: 75
End: 2025-03-26

## 2025-03-26 ENCOUNTER — OFFICE VISIT (OUTPATIENT)
Age: 75
End: 2025-03-26
Payer: MEDICARE

## 2025-03-26 VITALS
HEART RATE: 93 BPM | BODY MASS INDEX: 30.66 KG/M2 | HEIGHT: 71 IN | SYSTOLIC BLOOD PRESSURE: 118 MMHG | DIASTOLIC BLOOD PRESSURE: 72 MMHG | WEIGHT: 219 LBS

## 2025-03-26 DIAGNOSIS — I10 HYPERTENSION, UNSPECIFIED TYPE: ICD-10-CM

## 2025-03-26 DIAGNOSIS — Z86.79 HISTORY OF ATRIAL FIBRILLATION: Primary | ICD-10-CM

## 2025-03-26 DIAGNOSIS — E78.5 HYPERLIPIDEMIA, UNSPECIFIED HYPERLIPIDEMIA TYPE: ICD-10-CM

## 2025-03-26 DIAGNOSIS — I49.3 ASYMPTOMATIC PVCS: ICD-10-CM

## 2025-03-26 DIAGNOSIS — R79.89 LOW TESTOSTERONE IN MALE: Primary | ICD-10-CM

## 2025-03-26 PROBLEM — I48.0 PAROXYSMAL ATRIAL FIBRILLATION (HCC): Status: ACTIVE | Noted: 2025-03-26

## 2025-03-26 LAB
TESTOST FREE SERPL-MCNC: 6 PG/ML (ref 6.6–18.1)
TESTOST SERPL-MCNC: 497 NG/DL (ref 264–916)

## 2025-03-26 PROCEDURE — 1036F TOBACCO NON-USER: CPT | Performed by: INTERNAL MEDICINE

## 2025-03-26 PROCEDURE — 1123F ACP DISCUSS/DSCN MKR DOCD: CPT | Performed by: INTERNAL MEDICINE

## 2025-03-26 PROCEDURE — 3017F COLORECTAL CA SCREEN DOC REV: CPT | Performed by: INTERNAL MEDICINE

## 2025-03-26 PROCEDURE — G8417 CALC BMI ABV UP PARAM F/U: HCPCS | Performed by: INTERNAL MEDICINE

## 2025-03-26 PROCEDURE — 1126F AMNT PAIN NOTED NONE PRSNT: CPT | Performed by: INTERNAL MEDICINE

## 2025-03-26 PROCEDURE — 3074F SYST BP LT 130 MM HG: CPT | Performed by: INTERNAL MEDICINE

## 2025-03-26 PROCEDURE — G8428 CUR MEDS NOT DOCUMENT: HCPCS | Performed by: INTERNAL MEDICINE

## 2025-03-26 PROCEDURE — 93000 ELECTROCARDIOGRAM COMPLETE: CPT | Performed by: INTERNAL MEDICINE

## 2025-03-26 PROCEDURE — 99214 OFFICE O/P EST MOD 30 MIN: CPT | Performed by: INTERNAL MEDICINE

## 2025-03-26 PROCEDURE — 3078F DIAST BP <80 MM HG: CPT | Performed by: INTERNAL MEDICINE

## 2025-03-26 RX ORDER — PREDNISONE 1 MG/1
4 TABLET ORAL DAILY
COMMUNITY
Start: 2025-02-26

## 2025-03-26 RX ORDER — DILTIAZEM HYDROCHLORIDE 240 MG/1
240 CAPSULE, COATED, EXTENDED RELEASE ORAL DAILY
Qty: 90 CAPSULE | Refills: 3 | Status: SHIPPED | OUTPATIENT
Start: 2025-03-26

## 2025-03-26 RX ORDER — PREDNISONE 5 MG/1
10 TABLET ORAL AS NEEDED
COMMUNITY

## 2025-03-26 RX ORDER — ATORVASTATIN CALCIUM 20 MG/1
20 TABLET, FILM COATED ORAL DAILY
Qty: 90 TABLET | Refills: 3 | Status: SHIPPED | OUTPATIENT
Start: 2025-03-26

## 2025-03-27 RX ORDER — TESTOSTERONE ENANTHATE 75 MG/.5ML
75 INJECTION SUBCUTANEOUS WEEKLY
Qty: 0.5 ML | Refills: 2 | Status: SHIPPED | OUTPATIENT
Start: 2025-03-27

## 2025-03-27 NOTE — TELEPHONE ENCOUNTER
Woodrow Sood Jr., MD to Me     3/27/25  8:51 AM  Please tell him I  think the lung caner screen is a good idea. Also please send Xyosted #3 months 75mg subcutaneously once weekly. Schedule recheck in 6 weeks. Thanks.

## 2025-04-08 ENCOUNTER — PATIENT MESSAGE (OUTPATIENT)
Dept: FAMILY MEDICINE CLINIC | Facility: CLINIC | Age: 75
End: 2025-04-08

## 2025-04-08 DIAGNOSIS — I10 PRIMARY HYPERTENSION: ICD-10-CM

## 2025-04-08 RX ORDER — LISINOPRIL 10 MG/1
10 TABLET ORAL DAILY
Qty: 30 TABLET | Refills: 5 | Status: SHIPPED | OUTPATIENT
Start: 2025-04-08 | End: 2025-10-05

## 2025-04-10 ENCOUNTER — TELEPHONE (OUTPATIENT)
Dept: FAMILY MEDICINE CLINIC | Facility: CLINIC | Age: 75
End: 2025-04-10

## 2025-04-10 DIAGNOSIS — R68.82 DECREASED LIBIDO: ICD-10-CM

## 2025-04-10 DIAGNOSIS — R79.89 LOW TESTOSTERONE IN MALE: Primary | ICD-10-CM

## 2025-04-10 RX ORDER — TESTOSTERONE 20.25 MG/1.25G
1 GEL TOPICAL DAILY
Qty: 1.25 G | Refills: 2 | Status: SHIPPED | OUTPATIENT
Start: 2025-04-10 | End: 2025-04-16 | Stop reason: SDUPTHER

## 2025-04-10 NOTE — TELEPHONE ENCOUNTER
[facility] on [address] called to check on prior authorization status for Xyosted.  They keep getting a rejected claim. Please call them at [phone].

## 2025-04-14 DIAGNOSIS — I48.0 PAROXYSMAL ATRIAL FIBRILLATION (HCC): ICD-10-CM

## 2025-04-14 DIAGNOSIS — I10 ESSENTIAL HYPERTENSION: ICD-10-CM

## 2025-04-14 NOTE — TELEPHONE ENCOUNTER
Lalo Sharp \"Gene\" to [facility] Cardiology Clinical Staff (supporting Michael Sahu MD)        4/12/25 10:55 AM  Please authorize a refill on the above Eliquis.  I use [facility]  [address] [place] [address].  [phone]  This is a Rx previously prescribed by Dr. Turner who you have replaced.  Thank you,  Miki Sharp

## 2025-04-14 NOTE — TELEPHONE ENCOUNTER
Medication refill request for Eliquis- continued at last visit 3/26/25. Medication pended for MD approval.   Requested Prescriptions     Pending Prescriptions Disp Refills    apixaban (ELIQUIS) 5 MG TABS tablet 60 tablet 11     Sig: Take 1 tablet by mouth 2 times daily

## 2025-04-16 DIAGNOSIS — R79.89 LOW TESTOSTERONE IN MALE: ICD-10-CM

## 2025-04-16 DIAGNOSIS — R68.82 DECREASED LIBIDO: ICD-10-CM

## 2025-04-16 RX ORDER — TESTOSTERONE 20.25 MG/1.25G
1 GEL TOPICAL DAILY
Qty: 1.25 G | Refills: 2 | Status: SHIPPED | OUTPATIENT
Start: 2025-04-16 | End: 2025-05-16

## 2025-04-16 NOTE — TELEPHONE ENCOUNTER
Pharmacy requesting new prescription with diagnosis on it. One was sent already but pharmacist states they do not see the diagnosis. New prescription sent, will work on PA for Gel.

## 2025-04-21 ENCOUNTER — PATIENT MESSAGE (OUTPATIENT)
Dept: FAMILY MEDICINE CLINIC | Facility: CLINIC | Age: 75
End: 2025-04-21

## 2025-04-21 DIAGNOSIS — R68.82 DECREASED LIBIDO: ICD-10-CM

## 2025-04-21 DIAGNOSIS — R79.89 LOW TESTOSTERONE IN MALE: ICD-10-CM

## 2025-04-21 RX ORDER — TESTOSTERONE 20.25 MG/1.25G
GEL TOPICAL
Qty: 30 EACH | Refills: 5 | Status: SHIPPED | OUTPATIENT
Start: 2025-04-21 | End: 2025-04-22 | Stop reason: SDUPTHER

## 2025-04-22 ENCOUNTER — TELEPHONE (OUTPATIENT)
Dept: FAMILY MEDICINE CLINIC | Facility: CLINIC | Age: 75
End: 2025-04-22

## 2025-04-22 DIAGNOSIS — E29.1 HYPOGONADISM MALE: Primary | ICD-10-CM

## 2025-04-22 RX ORDER — TESTOSTERONE 20.25 MG/1.25G
GEL TOPICAL
Qty: 30 EACH | Refills: 5 | Status: SHIPPED | OUTPATIENT
Start: 2025-04-22 | End: 2025-10-19

## 2025-04-22 NOTE — TELEPHONE ENCOUNTER
[facility] on [address] called regarding Rx for Androgel.  Please call them back [phone]. Insurance is arguing with Diagnosis Code given for Rx.

## 2025-05-07 ENCOUNTER — PATIENT MESSAGE (OUTPATIENT)
Age: 75
End: 2025-05-07

## 2025-05-08 NOTE — TELEPHONE ENCOUNTER
Per Statin Protocol, sent the following [employer] message to patient:  Mr. Sharp,   Please send [employer] message or call our office at [phone] with a report 2 weeks after stopping atorvastatin. Dr. Sahu will make further recommendations at that time.  Thank you,   Maria Londono RN

## 2025-05-29 ENCOUNTER — PATIENT MESSAGE (OUTPATIENT)
Dept: FAMILY MEDICINE CLINIC | Facility: CLINIC | Age: 75
End: 2025-05-29

## 2025-05-29 DIAGNOSIS — R35.1 NOCTURIA: Primary | ICD-10-CM

## 2025-05-29 DIAGNOSIS — R39.198 DIFFICULTY URINATING: ICD-10-CM

## 2025-06-20 ENCOUNTER — PATIENT MESSAGE (OUTPATIENT)
Dept: FAMILY MEDICINE CLINIC | Facility: CLINIC | Age: 75
End: 2025-06-20

## 2025-06-20 DIAGNOSIS — H69.92 ACUTE DYSFUNCTION OF LEFT EUSTACHIAN TUBE: ICD-10-CM

## 2025-06-20 RX ORDER — AZELASTINE 1 MG/ML
2 SPRAY, METERED NASAL 2 TIMES DAILY
Qty: 60 ML | Refills: 1 | Status: SHIPPED | OUTPATIENT
Start: 2025-06-20

## 2025-06-23 ENCOUNTER — OFFICE VISIT (OUTPATIENT)
Dept: UROLOGY | Age: 75
End: 2025-06-23
Payer: MEDICARE

## 2025-06-23 DIAGNOSIS — N40.1 BPH WITH OBSTRUCTION/LOWER URINARY TRACT SYMPTOMS: Primary | ICD-10-CM

## 2025-06-23 DIAGNOSIS — N41.9 PROSTATITIS, UNSPECIFIED PROSTATITIS TYPE: ICD-10-CM

## 2025-06-23 DIAGNOSIS — N13.8 BPH WITH OBSTRUCTION/LOWER URINARY TRACT SYMPTOMS: Primary | ICD-10-CM

## 2025-06-23 LAB
BILIRUBIN, URINE, POC: NEGATIVE
BLOOD URINE, POC: NEGATIVE
GLUCOSE URINE, POC: NEGATIVE MG/DL
KETONES, URINE, POC: NEGATIVE MG/DL
LEUKOCYTE ESTERASE, URINE, POC: NEGATIVE
NITRITE, URINE, POC: NEGATIVE
PH, URINE, POC: 5.5 (ref 4.6–8)
PROTEIN,URINE, POC: NEGATIVE MG/DL
PVR, POC: 50 CC
SPECIFIC GRAVITY, URINE, POC: 1.02 (ref 1–1.03)
URINALYSIS CLARITY, POC: NORMAL
URINALYSIS COLOR, POC: NORMAL
UROBILINOGEN, POC: NORMAL MG/DL

## 2025-06-23 PROCEDURE — 1123F ACP DISCUSS/DSCN MKR DOCD: CPT | Performed by: NURSE PRACTITIONER

## 2025-06-23 PROCEDURE — 1036F TOBACCO NON-USER: CPT | Performed by: NURSE PRACTITIONER

## 2025-06-23 PROCEDURE — G8417 CALC BMI ABV UP PARAM F/U: HCPCS | Performed by: NURSE PRACTITIONER

## 2025-06-23 PROCEDURE — 99204 OFFICE O/P NEW MOD 45 MIN: CPT | Performed by: NURSE PRACTITIONER

## 2025-06-23 PROCEDURE — 1159F MED LIST DOCD IN RCRD: CPT | Performed by: NURSE PRACTITIONER

## 2025-06-23 PROCEDURE — 81003 URINALYSIS AUTO W/O SCOPE: CPT | Performed by: NURSE PRACTITIONER

## 2025-06-23 PROCEDURE — G8427 DOCREV CUR MEDS BY ELIG CLIN: HCPCS | Performed by: NURSE PRACTITIONER

## 2025-06-23 PROCEDURE — 3017F COLORECTAL CA SCREEN DOC REV: CPT | Performed by: NURSE PRACTITIONER

## 2025-06-23 PROCEDURE — 51798 US URINE CAPACITY MEASURE: CPT | Performed by: NURSE PRACTITIONER

## 2025-06-23 RX ORDER — CEPHALEXIN 500 MG/1
500 CAPSULE ORAL 3 TIMES DAILY
Qty: 30 CAPSULE | Refills: 0 | Status: SHIPPED | OUTPATIENT
Start: 2025-06-23 | End: 2025-07-03

## 2025-06-23 NOTE — PROGRESS NOTES
[facility]  [address]   [address]  [place] [address]  [phone]    Lalo Sharp  : 1950    Chief Complaint   Patient presents with    New Patient    Benign Prostatic Hypertrophy          HPI     Lalo Sharp is a 74 y.o. male who was last seen in  for BPH w Dr Brown. At that time, on Flomax 0.4 mg PO daily. Cialis 5 mg PO PRN ED.    Lab Results   Component Value Date    PSA 2.6 2025    PSA 2.9 2024    PSA 3.5 2023    PSA 5.6 (H) 2023    PSA 2.2 2022    PSA 2.3 2021    PSA 2.5 2020     PSA 2.3 2018  PSA 2.2016  PSA 1.2015  PSA 2.3 2014    He was referred back today for concern of incomplete bladder emptying. Occ discomfort w urination. No hematuria. Nocturia up to 3-4x/night. H/o prostatitis. Keflex helps.     PVR 50 cc via US in office today    Plays golf w Dr Antony Espinoza    Past Medical History:   Diagnosis Date    Allergy desensitization therapy     receives injections every other week    Asthma     inhaler prn, hasn't needed for several years    Atrial fibrillation (HCC)     PAF    Backache, unspecified 10/24/2016    Cervicalgia 10/24/2016    H/[facility] complete [facility] exam Fall     [facility]    Hyperlipidemia     Hypertension     medication    Pain in joint, multiple sites 10/24/2016    Positive HOMERO (antinuclear antibody) 10/24/2016    Work-up done with Rheumatology, negative work up    Shingles 's    hospitalized for 6 days    Toe fracture, left 2019    5th metatarcel     Past Surgical History:   Procedure Laterality Date    COLONOSCOPY      clear. Dr. Michele.    COLONOSCOPY N/A 2023    COLONOSCOPY POLYPECTOMY SNARE/COLD BIOPSY performed by Sonja Zepeda DO at [facility] ENDOSCOPY    CYST REMOVAL      HEENT      tonsils as a child    ORTHOPEDIC SURGERY      knee arthroscopic    ORTHOPEDIC SURGERY  2018

## (undated) DEVICE — SOLUTION IV 1000ML 0.9% SOD CHL

## (undated) DEVICE — INTENDED FOR TISSUE SEPARATION, AND OTHER PROCEDURES THAT REQUIRE A SHARP SURGICAL BLADE TO PUNCTURE OR CUT.: Brand: BARD-PARKER ® STAINLESS STEEL BLADES

## (undated) DEVICE — CONNECTOR TBNG OD5-7MM O2 END DISP

## (undated) DEVICE — DRSG GZ OIL EMUL CURAD 3X8 --

## (undated) DEVICE — GUIDEWIRE ORTH L150MM DIA1.1MM S STL NTHRD FOR 3MM CANN SCR

## (undated) DEVICE — SUTURE VCRL SZ 2-0 L27IN ABSRB UD L26MM CT-2 1/2 CIR J269H

## (undated) DEVICE — SINGLE PORT MANIFOLD: Brand: NEPTUNE 2

## (undated) DEVICE — LUBE JELLY FOIL PACK 1.4 OZ: Brand: MEDLINE INDUSTRIES, INC.

## (undated) DEVICE — ZIMMER® STERILE DISPOSABLE TOURNIQUET CUFF WITH PLC, DUAL PORT, SINGLE BLADDER, 18 IN. (46 CM)

## (undated) DEVICE — NEEDLE SYR 18GA L1.5IN RED PLAS HUB S STL BLNT FILL W/O

## (undated) DEVICE — PADDING CAST W2INXL4YD ST COT COHESIVE HND TEARABLE SPEC

## (undated) DEVICE — YANKAUER,BULB TIP,W/O VENT,RIGID,STERILE: Brand: MEDLINE

## (undated) DEVICE — SUT ETHLN 3-0 18IN PS1 BLK --

## (undated) DEVICE — PRECISION THIN, OFFSET (5.5 X 0.38 X 25.0MM)

## (undated) DEVICE — SYRINGE, LUER SLIP, STERILE, 60ML: Brand: MEDLINE

## (undated) DEVICE — KENDALL RADIOLUCENT FOAM MONITORING ELECTRODE RECTANGULAR SHAPE: Brand: KENDALL

## (undated) DEVICE — GAUZE,SPONGE,4"X4",12PLY,WOVEN,NS,LF: Brand: MEDLINE

## (undated) DEVICE — REM POLYHESIVE ADULT PATIENT RETURN ELECTRODE: Brand: VALLEYLAB

## (undated) DEVICE — (D)PREP SKN CHLRAPRP APPL 26ML -- CONVERT TO ITEM 371833

## (undated) DEVICE — AIRLIFE™ OXYGEN TUBING 7 FEET (2.1 M) CRUSH RESISTANT OXYGEN TUBING, VINYL TIPPED: Brand: AIRLIFE™

## (undated) DEVICE — AMD ANTIMICROBIAL GAUZE SPONGES,12 PLY USP TYPE VII, 0.2% POLYHEXAMETHYLENE BIGUANIDE HCI (PHMB): Brand: CURITY

## (undated) DEVICE — BUTTON SWITCH PENCIL BLADE ELECTRODE, HOLSTER: Brand: EDGE

## (undated) DEVICE — Device

## (undated) DEVICE — SUTURE MCRYL SZ 3-0 L27IN ABSRB UD L19MM PS-2 3/8 CIR PRIM Y427H

## (undated) DEVICE — CANNULA NSL ORAL AD FOR CAPNOFLEX CO2 O2 AIRLFE

## (undated) DEVICE — SYRINGE MED 3ML CLR PLAS STD N CTRL LUERLOCK TIP DISP

## (undated) DEVICE — BIT DRL L150MM DIA2MM CANN QUIK CPL W/O STP REUSE FOR 3MM

## (undated) DEVICE — BNDG SOF-FORM 2X75 STRL LF --

## (undated) DEVICE — SYRINGE MED 10ML LUERLOCK TIP W/O SFTY DISP

## (undated) DEVICE — BNDG ELAS COBAN 2INX5YD NS --

## (undated) DEVICE — CAP PROTCT PIN BALL 0.045IN --

## (undated) DEVICE — ENDOSCOPIC KIT 1.1+ OP4 CA DE 2 GWN AAMI LEVEL 3

## (undated) DEVICE — DRAPE C ARM W54XL84IN MINI FOR OEC 6800

## (undated) DEVICE — FORCEPS BX L240CM JAW DIA2.8MM L CAP W/ NDL MIC MESH TOOTH

## (undated) DEVICE — CONTAINER FORMALIN PREFILLED 10% NBF 60ML